# Patient Record
Sex: MALE | Race: WHITE | Employment: OTHER | URBAN - METROPOLITAN AREA
[De-identification: names, ages, dates, MRNs, and addresses within clinical notes are randomized per-mention and may not be internally consistent; named-entity substitution may affect disease eponyms.]

---

## 2017-05-10 ENCOUNTER — OFFICE VISIT (OUTPATIENT)
Dept: FAMILY MEDICINE CLINIC | Age: 77
End: 2017-05-10

## 2017-05-10 VITALS
DIASTOLIC BLOOD PRESSURE: 72 MMHG | HEIGHT: 70 IN | SYSTOLIC BLOOD PRESSURE: 126 MMHG | BODY MASS INDEX: 26.66 KG/M2 | HEART RATE: 65 BPM | WEIGHT: 186.2 LBS | OXYGEN SATURATION: 99 %

## 2017-05-10 DIAGNOSIS — Z13.1 SCREENING FOR DIABETES MELLITUS (DM): ICD-10-CM

## 2017-05-10 DIAGNOSIS — Z00.00 ROUTINE GENERAL MEDICAL EXAMINATION AT A HEALTH CARE FACILITY: Primary | ICD-10-CM

## 2017-05-10 DIAGNOSIS — Z23 NEED FOR SHINGLES VACCINE: ICD-10-CM

## 2017-05-10 DIAGNOSIS — Z12.5 SCREENING FOR PROSTATE CANCER: ICD-10-CM

## 2017-05-10 DIAGNOSIS — Z23 NEED FOR PROPHYLACTIC VACCINATION AGAINST STREPTOCOCCUS PNEUMONIAE (PNEUMOCOCCUS): ICD-10-CM

## 2017-05-10 DIAGNOSIS — Z13.6 SCREENING FOR CARDIOVASCULAR CONDITION: ICD-10-CM

## 2017-05-10 LAB
CHOLESTEROL, TOTAL: 186 MG/DL (ref 0–199)
GLUCOSE BLD-MCNC: 105 MG/DL (ref 70–99)
HDLC SERPL-MCNC: 60 MG/DL (ref 40–60)
LDL CHOLESTEROL CALCULATED: 110 MG/DL
PROSTATE SPECIFIC ANTIGEN: 1.76 NG/ML (ref 0–4)
TRIGL SERPL-MCNC: 78 MG/DL (ref 0–150)
VLDLC SERPL CALC-MCNC: 16 MG/DL

## 2017-05-10 PROCEDURE — G0102 PROSTATE CA SCREENING; DRE: HCPCS | Performed by: FAMILY MEDICINE

## 2017-05-10 PROCEDURE — G0438 PPPS, INITIAL VISIT: HCPCS | Performed by: FAMILY MEDICINE

## 2017-05-10 PROCEDURE — 90670 PCV13 VACCINE IM: CPT | Performed by: FAMILY MEDICINE

## 2017-05-10 PROCEDURE — G0009 ADMIN PNEUMOCOCCAL VACCINE: HCPCS | Performed by: FAMILY MEDICINE

## 2017-05-10 PROCEDURE — 36415 COLL VENOUS BLD VENIPUNCTURE: CPT | Performed by: FAMILY MEDICINE

## 2017-05-10 ASSESSMENT — LIFESTYLE VARIABLES
AUDIT TOTAL SCORE: 2
HOW OFTEN DURING THE LAST YEAR HAVE YOU NEEDED AN ALCOHOLIC DRINK FIRST THING IN THE MORNING TO GET YOURSELF GOING AFTER A NIGHT OF HEAVY DRINKING: 0
HOW OFTEN DO YOU HAVE SIX OR MORE DRINKS ON ONE OCCASION: 0
HOW OFTEN DO YOU HAVE A DRINK CONTAINING ALCOHOL: 2
HAVE YOU OR SOMEONE ELSE BEEN INJURED AS A RESULT OF YOUR DRINKING: 0
HOW OFTEN DURING THE LAST YEAR HAVE YOU HAD A FEELING OF GUILT OR REMORSE AFTER DRINKING: 0
HOW OFTEN DURING THE LAST YEAR HAVE YOU BEEN UNABLE TO REMEMBER WHAT HAPPENED THE NIGHT BEFORE BECAUSE YOU HAD BEEN DRINKING: 0
HOW OFTEN DURING THE LAST YEAR HAVE YOU FOUND THAT YOU WERE NOT ABLE TO STOP DRINKING ONCE YOU HAD STARTED: 0
HOW OFTEN DURING THE LAST YEAR HAVE YOU FAILED TO DO WHAT WAS NORMALLY EXPECTED FROM YOU BECAUSE OF DRINKING: 0
AUDIT-C TOTAL SCORE: 2
HOW MANY STANDARD DRINKS CONTAINING ALCOHOL DO YOU HAVE ON A TYPICAL DAY: 0
HAS A RELATIVE, FRIEND, DOCTOR, OR ANOTHER HEALTH PROFESSIONAL EXPRESSED CONCERN ABOUT YOUR DRINKING OR SUGGESTED YOU CUT DOWN: 0

## 2017-05-10 ASSESSMENT — PATIENT HEALTH QUESTIONNAIRE - PHQ9: SUM OF ALL RESPONSES TO PHQ QUESTIONS 1-9: 0

## 2017-05-10 ASSESSMENT — ANXIETY QUESTIONNAIRES: GAD7 TOTAL SCORE: 0

## 2017-08-31 ENCOUNTER — PROCEDURE VISIT (OUTPATIENT)
Dept: FAMILY MEDICINE CLINIC | Age: 77
End: 2017-08-31

## 2017-08-31 VITALS
WEIGHT: 178 LBS | TEMPERATURE: 97.7 F | DIASTOLIC BLOOD PRESSURE: 64 MMHG | BODY MASS INDEX: 25.91 KG/M2 | OXYGEN SATURATION: 98 % | SYSTOLIC BLOOD PRESSURE: 140 MMHG | HEART RATE: 77 BPM

## 2017-08-31 DIAGNOSIS — R20.9 DISTURBANCE OF SKIN SENSATION: ICD-10-CM

## 2017-08-31 DIAGNOSIS — L91.8 SKIN TAG: Primary | ICD-10-CM

## 2017-08-31 PROCEDURE — 11200 RMVL SKIN TAGS UP TO&INC 15: CPT | Performed by: FAMILY MEDICINE

## 2017-09-19 ENCOUNTER — OFFICE VISIT (OUTPATIENT)
Dept: FAMILY MEDICINE CLINIC | Age: 77
End: 2017-09-19

## 2017-09-19 VITALS — OXYGEN SATURATION: 98 % | SYSTOLIC BLOOD PRESSURE: 128 MMHG | DIASTOLIC BLOOD PRESSURE: 72 MMHG | HEART RATE: 67 BPM

## 2017-09-19 DIAGNOSIS — F41.9 ANXIETY: Primary | ICD-10-CM

## 2017-09-19 PROCEDURE — 4040F PNEUMOC VAC/ADMIN/RCVD: CPT | Performed by: NURSE PRACTITIONER

## 2017-09-19 PROCEDURE — G8427 DOCREV CUR MEDS BY ELIG CLIN: HCPCS | Performed by: NURSE PRACTITIONER

## 2017-09-19 PROCEDURE — G8417 CALC BMI ABV UP PARAM F/U: HCPCS | Performed by: NURSE PRACTITIONER

## 2017-09-19 PROCEDURE — 1036F TOBACCO NON-USER: CPT | Performed by: NURSE PRACTITIONER

## 2017-09-19 PROCEDURE — 99213 OFFICE O/P EST LOW 20 MIN: CPT | Performed by: NURSE PRACTITIONER

## 2017-09-19 PROCEDURE — 1123F ACP DISCUSS/DSCN MKR DOCD: CPT | Performed by: NURSE PRACTITIONER

## 2017-09-19 RX ORDER — ESCITALOPRAM OXALATE 10 MG/1
10 TABLET ORAL DAILY
Qty: 30 TABLET | Refills: 3 | Status: SHIPPED | OUTPATIENT
Start: 2017-09-19 | End: 2018-10-01

## 2017-11-01 ENCOUNTER — TELEPHONE (OUTPATIENT)
Dept: FAMILY MEDICINE CLINIC | Age: 77
End: 2017-11-01

## 2017-11-01 NOTE — TELEPHONE ENCOUNTER
Patient called and wanted to let Shikha Chahal know that the Escitalopram that she prescribed for him is working. He said that he just picked up the 1 refill of the medication.

## 2018-08-22 ENCOUNTER — TELEPHONE (OUTPATIENT)
Dept: FAMILY MEDICINE CLINIC | Age: 78
End: 2018-08-22

## 2018-10-01 ENCOUNTER — OFFICE VISIT (OUTPATIENT)
Dept: FAMILY MEDICINE CLINIC | Age: 78
End: 2018-10-01
Payer: MEDICARE

## 2018-10-01 VITALS
SYSTOLIC BLOOD PRESSURE: 140 MMHG | DIASTOLIC BLOOD PRESSURE: 74 MMHG | BODY MASS INDEX: 25.77 KG/M2 | HEART RATE: 145 BPM | HEIGHT: 70 IN | OXYGEN SATURATION: 98 % | WEIGHT: 180 LBS

## 2018-10-01 DIAGNOSIS — L98.9 SKIN LESION: Primary | ICD-10-CM

## 2018-10-01 PROCEDURE — 99213 OFFICE O/P EST LOW 20 MIN: CPT | Performed by: FAMILY MEDICINE

## 2018-10-01 PROCEDURE — 1101F PT FALLS ASSESS-DOCD LE1/YR: CPT | Performed by: FAMILY MEDICINE

## 2018-10-01 PROCEDURE — G8427 DOCREV CUR MEDS BY ELIG CLIN: HCPCS | Performed by: FAMILY MEDICINE

## 2018-10-01 PROCEDURE — G8484 FLU IMMUNIZE NO ADMIN: HCPCS | Performed by: FAMILY MEDICINE

## 2018-10-01 PROCEDURE — G8419 CALC BMI OUT NRM PARAM NOF/U: HCPCS | Performed by: FAMILY MEDICINE

## 2018-10-01 PROCEDURE — 1036F TOBACCO NON-USER: CPT | Performed by: FAMILY MEDICINE

## 2018-10-01 PROCEDURE — 1123F ACP DISCUSS/DSCN MKR DOCD: CPT | Performed by: FAMILY MEDICINE

## 2018-10-01 PROCEDURE — 4040F PNEUMOC VAC/ADMIN/RCVD: CPT | Performed by: FAMILY MEDICINE

## 2018-10-01 ASSESSMENT — PATIENT HEALTH QUESTIONNAIRE - PHQ9
SUM OF ALL RESPONSES TO PHQ QUESTIONS 1-9: 0
SUM OF ALL RESPONSES TO PHQ QUESTIONS 1-9: 0
2. FEELING DOWN, DEPRESSED OR HOPELESS: 0
1. LITTLE INTEREST OR PLEASURE IN DOING THINGS: 0
SUM OF ALL RESPONSES TO PHQ9 QUESTIONS 1 & 2: 0

## 2018-10-15 ENCOUNTER — OFFICE VISIT (OUTPATIENT)
Dept: FAMILY MEDICINE CLINIC | Age: 78
End: 2018-10-15
Payer: MEDICARE

## 2018-10-15 VITALS
SYSTOLIC BLOOD PRESSURE: 112 MMHG | HEART RATE: 68 BPM | DIASTOLIC BLOOD PRESSURE: 74 MMHG | OXYGEN SATURATION: 98 % | BODY MASS INDEX: 26.49 KG/M2 | WEIGHT: 182 LBS

## 2018-10-15 DIAGNOSIS — C44.519 BASAL CELL CARCINOMA (BCC) OF SKIN OF OTHER PART OF TORSO: ICD-10-CM

## 2018-10-15 DIAGNOSIS — L98.9 SKIN LESION OF CHEST WALL: Primary | ICD-10-CM

## 2018-10-15 PROCEDURE — 11603 EXC TR-EXT MAL+MARG 2.1-3 CM: CPT | Performed by: FAMILY MEDICINE

## 2018-10-24 ENCOUNTER — OFFICE VISIT (OUTPATIENT)
Dept: FAMILY MEDICINE CLINIC | Age: 78
End: 2018-10-24

## 2018-10-24 VITALS
OXYGEN SATURATION: 98 % | DIASTOLIC BLOOD PRESSURE: 80 MMHG | SYSTOLIC BLOOD PRESSURE: 138 MMHG | BODY MASS INDEX: 26.7 KG/M2 | HEART RATE: 78 BPM | WEIGHT: 183.4 LBS

## 2018-10-24 DIAGNOSIS — Z48.02 VISIT FOR SUTURE REMOVAL: Primary | ICD-10-CM

## 2018-10-24 PROCEDURE — 99999 PR OFFICE/OUTPT VISIT,PROCEDURE ONLY: CPT | Performed by: FAMILY MEDICINE

## 2020-02-14 ENCOUNTER — OFFICE VISIT (OUTPATIENT)
Dept: FAMILY MEDICINE CLINIC | Age: 80
End: 2020-02-14
Payer: MEDICARE

## 2020-02-14 VITALS
DIASTOLIC BLOOD PRESSURE: 64 MMHG | WEIGHT: 186 LBS | OXYGEN SATURATION: 95 % | HEART RATE: 61 BPM | SYSTOLIC BLOOD PRESSURE: 132 MMHG | HEIGHT: 70 IN | BODY MASS INDEX: 26.63 KG/M2

## 2020-02-14 PROCEDURE — 11603 EXC TR-EXT MAL+MARG 2.1-3 CM: CPT | Performed by: FAMILY MEDICINE

## 2020-02-14 ASSESSMENT — PATIENT HEALTH QUESTIONNAIRE - PHQ9
2. FEELING DOWN, DEPRESSED OR HOPELESS: 0
SUM OF ALL RESPONSES TO PHQ QUESTIONS 1-9: 0
1. LITTLE INTEREST OR PLEASURE IN DOING THINGS: 0
SUM OF ALL RESPONSES TO PHQ9 QUESTIONS 1 & 2: 0
SUM OF ALL RESPONSES TO PHQ QUESTIONS 1-9: 0

## 2020-02-17 ENCOUNTER — TELEPHONE (OUTPATIENT)
Dept: FAMILY MEDICINE CLINIC | Age: 80
End: 2020-02-17

## 2020-02-24 ENCOUNTER — OFFICE VISIT (OUTPATIENT)
Dept: FAMILY MEDICINE CLINIC | Age: 80
End: 2020-02-24

## 2020-02-24 VITALS
HEART RATE: 62 BPM | OXYGEN SATURATION: 95 % | TEMPERATURE: 97.2 F | SYSTOLIC BLOOD PRESSURE: 134 MMHG | BODY MASS INDEX: 27.07 KG/M2 | WEIGHT: 186 LBS | DIASTOLIC BLOOD PRESSURE: 72 MMHG

## 2020-05-15 ENCOUNTER — TELEPHONE (OUTPATIENT)
Dept: FAMILY MEDICINE CLINIC | Age: 80
End: 2020-05-15

## 2020-05-19 ENCOUNTER — OFFICE VISIT (OUTPATIENT)
Dept: FAMILY MEDICINE CLINIC | Age: 80
End: 2020-05-19
Payer: MEDICARE

## 2020-05-19 VITALS
TEMPERATURE: 98 F | WEIGHT: 182 LBS | OXYGEN SATURATION: 96 % | HEART RATE: 69 BPM | BODY MASS INDEX: 26.49 KG/M2 | DIASTOLIC BLOOD PRESSURE: 76 MMHG | SYSTOLIC BLOOD PRESSURE: 158 MMHG

## 2020-05-19 PROCEDURE — 11302 SHAVE SKIN LESION 1.1-2.0 CM: CPT | Performed by: FAMILY MEDICINE

## 2020-05-23 ENCOUNTER — TELEPHONE (OUTPATIENT)
Dept: FAMILY MEDICINE CLINIC | Age: 80
End: 2020-05-23

## 2020-05-26 ENCOUNTER — TELEPHONE (OUTPATIENT)
Dept: FAMILY MEDICINE CLINIC | Age: 80
End: 2020-05-26

## 2020-05-26 NOTE — TELEPHONE ENCOUNTER
[facility] received a call from: wife    Name of Caller: Eliezer    Relationship to patient:self    Organization name: n/a    Best contact number: [phone]    Reason for call: looking for results done a week ago something taken off chest . Looks good right now.  cb pt

## 2020-10-16 ENCOUNTER — NURSE ONLY (OUTPATIENT)
Dept: FAMILY MEDICINE CLINIC | Age: 80
End: 2020-10-16
Payer: MEDICARE

## 2020-10-16 VITALS — TEMPERATURE: 98.2 F

## 2020-10-16 PROCEDURE — G0008 ADMIN INFLUENZA VIRUS VAC: HCPCS | Performed by: FAMILY MEDICINE

## 2020-10-16 PROCEDURE — 90694 VACC AIIV4 NO PRSRV 0.5ML IM: CPT | Performed by: FAMILY MEDICINE

## 2020-10-16 NOTE — PROGRESS NOTES
Vaccine Information Sheet, \"Influenza - Inactivated\"  given to [facility], or parent/legal guardian of  [facility] and verbalized understanding. Patient responses:    Have you ever had a reaction to a flu vaccine? No  Do you have any current illness? No  Have you ever had Guillian [place] Syndrome? No  Do you have a serious allergy to any of the follow: Neomycin, Polymyxin, Thimerosal, eggs or egg products? No    Flu vaccine given per order. Please see immunization tab. Risks and benefits explained. Current VIS given.

## 2021-01-14 ENCOUNTER — TELEPHONE (OUTPATIENT)
Dept: FAMILY MEDICINE CLINIC | Age: 81
End: 2021-01-14

## 2021-01-21 LAB — SARS-COV-2: ABNORMAL

## 2021-01-30 ENCOUNTER — APPOINTMENT (OUTPATIENT)
Dept: GENERAL RADIOLOGY | Age: 81
DRG: 177 | End: 2021-01-30
Payer: MEDICARE

## 2021-01-30 ENCOUNTER — HOSPITAL ENCOUNTER (INPATIENT)
Age: 81
LOS: 3 days | Discharge: HOME | DRG: 177 | End: 2021-02-03
Attending: EMERGENCY MEDICINE | Admitting: INTERNAL MEDICINE
Payer: MEDICARE

## 2021-01-30 DIAGNOSIS — J96.01 ACUTE HYPOXEMIC RESPIRATORY FAILURE DUE TO COVID-19 (HCC): Primary | ICD-10-CM

## 2021-01-30 DIAGNOSIS — U07.1 ACUTE HYPOXEMIC RESPIRATORY FAILURE DUE TO COVID-19 (HCC): Primary | ICD-10-CM

## 2021-01-30 DIAGNOSIS — R50.9 ACUTE FEBRILE ILLNESS: ICD-10-CM

## 2021-01-30 LAB
A/G RATIO: 1.2 (ref 1.1–2.2)
ALBUMIN SERPL-MCNC: 3.8 G/DL (ref 3.4–5)
ALP BLD-CCNC: 52 U/L (ref 40–129)
ALT SERPL-CCNC: 30 U/L (ref 10–40)
ANION GAP SERPL CALCULATED.3IONS-SCNC: 15 MMOL/L (ref 3–16)
AST SERPL-CCNC: 43 U/L (ref 15–37)
BASOPHILS ABSOLUTE: 0 K/UL (ref 0–0.2)
BASOPHILS RELATIVE PERCENT: 0.2 %
BILIRUB SERPL-MCNC: 0.7 MG/DL (ref 0–1)
BUN BLDV-MCNC: 16 MG/DL (ref 7–20)
CALCIUM SERPL-MCNC: 8.3 MG/DL (ref 8.3–10.6)
CHLORIDE BLD-SCNC: 95 MMOL/L (ref 99–110)
CO2: 21 MMOL/L (ref 21–32)
CREAT SERPL-MCNC: 1.2 MG/DL (ref 0.8–1.3)
EOSINOPHILS ABSOLUTE: 0 K/UL (ref 0–0.6)
EOSINOPHILS RELATIVE PERCENT: 0.1 %
GFR AFRICAN AMERICAN: >60
GFR NON-AFRICAN AMERICAN: 58
GLOBULIN: 3.1 G/DL
GLUCOSE BLD-MCNC: 125 MG/DL (ref 70–99)
HCT VFR BLD CALC: 40.8 % (ref 40.5–52.5)
HEMOGLOBIN: 14 G/DL (ref 13.5–17.5)
LACTIC ACID: 1.3 MMOL/L (ref 0.4–2)
LYMPHOCYTES ABSOLUTE: 0.5 K/UL (ref 1–5.1)
LYMPHOCYTES RELATIVE PERCENT: 10.2 %
MAGNESIUM: 1.9 MG/DL (ref 1.8–2.4)
MCH RBC QN AUTO: 30.1 PG (ref 26–34)
MCHC RBC AUTO-ENTMCNC: 34.3 G/DL (ref 31–36)
MCV RBC AUTO: 87.7 FL (ref 80–100)
MONOCYTES ABSOLUTE: 0.2 K/UL (ref 0–1.3)
MONOCYTES RELATIVE PERCENT: 4.7 %
NEUTROPHILS ABSOLUTE: 4 K/UL (ref 1.7–7.7)
NEUTROPHILS RELATIVE PERCENT: 84.8 %
PDW BLD-RTO: 13.5 % (ref 12.4–15.4)
PLATELET # BLD: 201 K/UL (ref 135–450)
PMV BLD AUTO: 7.6 FL (ref 5–10.5)
POTASSIUM SERPL-SCNC: 3.7 MMOL/L (ref 3.5–5.1)
PRO-BNP: 338 PG/ML (ref 0–449)
PROCALCITONIN: 0.12 NG/ML (ref 0–0.15)
RBC # BLD: 4.65 M/UL (ref 4.2–5.9)
SODIUM BLD-SCNC: 131 MMOL/L (ref 136–145)
SPECIMEN STATUS: NORMAL
TOTAL PROTEIN: 6.9 G/DL (ref 6.4–8.2)
TROPONIN: <0.01 NG/ML
WBC # BLD: 4.7 K/UL (ref 4–11)

## 2021-01-30 PROCEDURE — 6370000000 HC RX 637 (ALT 250 FOR IP): Performed by: EMERGENCY MEDICINE

## 2021-01-30 PROCEDURE — 36415 COLL VENOUS BLD VENIPUNCTURE: CPT

## 2021-01-30 PROCEDURE — 86900 BLOOD TYPING SEROLOGIC ABO: CPT

## 2021-01-30 PROCEDURE — 86140 C-REACTIVE PROTEIN: CPT

## 2021-01-30 PROCEDURE — 85025 COMPLETE CBC W/AUTO DIFF WBC: CPT

## 2021-01-30 PROCEDURE — 96374 THER/PROPH/DIAG INJ IV PUSH: CPT

## 2021-01-30 PROCEDURE — 93005 ELECTROCARDIOGRAM TRACING: CPT | Performed by: EMERGENCY MEDICINE

## 2021-01-30 PROCEDURE — 99284 EMERGENCY DEPT VISIT MOD MDM: CPT

## 2021-01-30 PROCEDURE — 84484 ASSAY OF TROPONIN QUANT: CPT

## 2021-01-30 PROCEDURE — 87040 BLOOD CULTURE FOR BACTERIA: CPT

## 2021-01-30 PROCEDURE — 83735 ASSAY OF MAGNESIUM: CPT

## 2021-01-30 PROCEDURE — 2700000000 HC OXYGEN THERAPY PER DAY

## 2021-01-30 PROCEDURE — 71045 X-RAY EXAM CHEST 1 VIEW: CPT

## 2021-01-30 PROCEDURE — 85379 FIBRIN DEGRADATION QUANT: CPT

## 2021-01-30 PROCEDURE — 83605 ASSAY OF LACTIC ACID: CPT

## 2021-01-30 PROCEDURE — 86901 BLOOD TYPING SEROLOGIC RH(D): CPT

## 2021-01-30 PROCEDURE — 83880 ASSAY OF NATRIURETIC PEPTIDE: CPT

## 2021-01-30 PROCEDURE — 84145 PROCALCITONIN (PCT): CPT

## 2021-01-30 PROCEDURE — 6360000002 HC RX W HCPCS: Performed by: EMERGENCY MEDICINE

## 2021-01-30 PROCEDURE — 85384 FIBRINOGEN ACTIVITY: CPT

## 2021-01-30 PROCEDURE — 82728 ASSAY OF FERRITIN: CPT

## 2021-01-30 PROCEDURE — 86850 RBC ANTIBODY SCREEN: CPT

## 2021-01-30 PROCEDURE — 80053 COMPREHEN METABOLIC PANEL: CPT

## 2021-01-30 PROCEDURE — 83615 LACTATE (LD) (LDH) ENZYME: CPT

## 2021-01-30 RX ORDER — DEXAMETHASONE SODIUM PHOSPHATE 10 MG/ML
6 INJECTION INTRAMUSCULAR; INTRAVENOUS ONCE
Status: COMPLETED | OUTPATIENT
Start: 2021-01-30 | End: 2021-01-30

## 2021-01-30 RX ORDER — ACETAMINOPHEN 500 MG
1000 TABLET ORAL ONCE
Status: COMPLETED | OUTPATIENT
Start: 2021-01-30 | End: 2021-01-30

## 2021-01-30 RX ADMIN — DEXAMETHASONE SODIUM PHOSPHATE 6 MG: 10 INJECTION INTRAMUSCULAR; INTRAVENOUS at 23:11

## 2021-01-30 RX ADMIN — ACETAMINOPHEN 1000 MG: 500 TABLET ORAL at 23:10

## 2021-01-30 ASSESSMENT — ENCOUNTER SYMPTOMS: TACHYPNEA: 1

## 2021-01-31 PROBLEM — J06.9 ACUTE RESPIRATORY DISEASE DUE TO COVID-19 VIRUS: Status: ACTIVE | Noted: 2021-01-31

## 2021-01-31 PROBLEM — U07.1 ACUTE RESPIRATORY DISEASE DUE TO COVID-19 VIRUS: Status: ACTIVE | Noted: 2021-01-31

## 2021-01-31 LAB
ABO/RH: NORMAL
ANTIBODY SCREEN: NORMAL
BLOOD BANK DISPENSE STATUS: NORMAL
BLOOD BANK PRODUCT CODE: NORMAL
BPU ID: NORMAL
C-REACTIVE PROTEIN: 80.9 MG/L (ref 0–5.1)
D DIMER: 524 NG/ML DDU (ref 0–229)
D DIMER: 558 NG/ML DDU (ref 0–229)
DESCRIPTION BLOOD BANK: NORMAL
EKG ATRIAL RATE: 96 BPM
EKG DIAGNOSIS: NORMAL
EKG Q-T INTERVAL: 326 MS
EKG QRS DURATION: 102 MS
EKG QTC CALCULATION (BAZETT): 416 MS
EKG R AXIS: -59 DEGREES
EKG T AXIS: 6 DEGREES
EKG VENTRICULAR RATE: 98 BPM
FERRITIN: 892.4 NG/ML (ref 30–400)
FIBRINOGEN: 858 MG/DL (ref 200–397)
LACTATE DEHYDROGENASE: 318 U/L (ref 100–190)
LACTIC ACID: 1.1 MMOL/L (ref 0.4–2)
REASON FOR REJECTION: NORMAL
REJECTED TEST: NORMAL

## 2021-01-31 PROCEDURE — XW033E5 INTRODUCTION OF REMDESIVIR ANTI-INFECTIVE INTO PERIPHERAL VEIN, PERCUTANEOUS APPROACH, NEW TECHNOLOGY GROUP 5: ICD-10-PCS | Performed by: INTERNAL MEDICINE

## 2021-01-31 PROCEDURE — 36415 COLL VENOUS BLD VENIPUNCTURE: CPT

## 2021-01-31 PROCEDURE — 93010 ELECTROCARDIOGRAM REPORT: CPT | Performed by: INTERNAL MEDICINE

## 2021-01-31 PROCEDURE — 83605 ASSAY OF LACTIC ACID: CPT

## 2021-01-31 PROCEDURE — 85379 FIBRIN DEGRADATION QUANT: CPT

## 2021-01-31 PROCEDURE — 1200000000 HC SEMI PRIVATE

## 2021-01-31 PROCEDURE — 6360000002 HC RX W HCPCS: Performed by: INTERNAL MEDICINE

## 2021-01-31 PROCEDURE — XW13325 TRANSFUSION OF CONVALESCENT PLASMA (NONAUTOLOGOUS) INTO PERIPHERAL VEIN, PERCUTANEOUS APPROACH, NEW TECHNOLOGY GROUP 5: ICD-10-PCS | Performed by: INTERNAL MEDICINE

## 2021-01-31 PROCEDURE — 2700000000 HC OXYGEN THERAPY PER DAY

## 2021-01-31 PROCEDURE — 87449 NOS EACH ORGANISM AG IA: CPT

## 2021-01-31 PROCEDURE — 2580000003 HC RX 258: Performed by: INTERNAL MEDICINE

## 2021-01-31 PROCEDURE — 94761 N-INVAS EAR/PLS OXIMETRY MLT: CPT

## 2021-01-31 PROCEDURE — 87205 SMEAR GRAM STAIN: CPT

## 2021-01-31 PROCEDURE — 2500000003 HC RX 250 WO HCPCS: Performed by: INTERNAL MEDICINE

## 2021-01-31 RX ORDER — ACETAMINOPHEN 325 MG/1
650 TABLET ORAL EVERY 6 HOURS PRN
Status: DISCONTINUED | OUTPATIENT
Start: 2021-01-31 | End: 2021-01-31 | Stop reason: SDUPTHER

## 2021-01-31 RX ORDER — 0.9 % SODIUM CHLORIDE 0.9 %
30 INTRAVENOUS SOLUTION INTRAVENOUS PRN
Status: DISCONTINUED | OUTPATIENT
Start: 2021-01-31 | End: 2021-02-03 | Stop reason: HOSPADM

## 2021-01-31 RX ORDER — ONDANSETRON 2 MG/ML
4 INJECTION INTRAMUSCULAR; INTRAVENOUS EVERY 6 HOURS PRN
Status: DISCONTINUED | OUTPATIENT
Start: 2021-01-31 | End: 2021-02-03 | Stop reason: HOSPADM

## 2021-01-31 RX ORDER — METHYLPREDNISOLONE SODIUM SUCCINATE 40 MG/ML
40 INJECTION, POWDER, LYOPHILIZED, FOR SOLUTION INTRAMUSCULAR; INTRAVENOUS EVERY 12 HOURS
Status: DISCONTINUED | OUTPATIENT
Start: 2021-01-31 | End: 2021-02-03 | Stop reason: HOSPADM

## 2021-01-31 RX ORDER — SODIUM CHLORIDE 9 MG/ML
INJECTION, SOLUTION INTRAVENOUS PRN
Status: DISCONTINUED | OUTPATIENT
Start: 2021-01-31 | End: 2021-02-03 | Stop reason: HOSPADM

## 2021-01-31 RX ORDER — ACETAMINOPHEN 650 MG/1
650 SUPPOSITORY RECTAL EVERY 6 HOURS PRN
Status: DISCONTINUED | OUTPATIENT
Start: 2021-01-31 | End: 2021-02-03 | Stop reason: HOSPADM

## 2021-01-31 RX ORDER — POLYETHYLENE GLYCOL 3350 17 G/17G
17 POWDER, FOR SOLUTION ORAL DAILY PRN
Status: DISCONTINUED | OUTPATIENT
Start: 2021-01-31 | End: 2021-02-03 | Stop reason: HOSPADM

## 2021-01-31 RX ORDER — ACETAMINOPHEN 650 MG/1
650 SUPPOSITORY RECTAL EVERY 6 HOURS PRN
Status: DISCONTINUED | OUTPATIENT
Start: 2021-01-31 | End: 2021-01-31 | Stop reason: SDUPTHER

## 2021-01-31 RX ORDER — GUAIFENESIN/DEXTROMETHORPHAN 100-10MG/5
5 SYRUP ORAL EVERY 4 HOURS PRN
Status: DISCONTINUED | OUTPATIENT
Start: 2021-01-31 | End: 2021-02-03 | Stop reason: HOSPADM

## 2021-01-31 RX ORDER — SODIUM CHLORIDE 0.9 % (FLUSH) 0.9 %
10 SYRINGE (ML) INJECTION EVERY 12 HOURS SCHEDULED
Status: DISCONTINUED | OUTPATIENT
Start: 2021-01-31 | End: 2021-02-03 | Stop reason: HOSPADM

## 2021-01-31 RX ORDER — SODIUM CHLORIDE 0.9 % (FLUSH) 0.9 %
10 SYRINGE (ML) INJECTION PRN
Status: DISCONTINUED | OUTPATIENT
Start: 2021-01-31 | End: 2021-02-03 | Stop reason: HOSPADM

## 2021-01-31 RX ORDER — PROMETHAZINE HYDROCHLORIDE 25 MG/1
12.5 TABLET ORAL EVERY 6 HOURS PRN
Status: DISCONTINUED | OUTPATIENT
Start: 2021-01-31 | End: 2021-02-03 | Stop reason: HOSPADM

## 2021-01-31 RX ORDER — ACETAMINOPHEN 325 MG/1
650 TABLET ORAL EVERY 6 HOURS PRN
Status: DISCONTINUED | OUTPATIENT
Start: 2021-01-31 | End: 2021-02-03 | Stop reason: HOSPADM

## 2021-01-31 RX ADMIN — SODIUM CHLORIDE, PRESERVATIVE FREE 10 ML: 5 INJECTION INTRAVENOUS at 22:04

## 2021-01-31 RX ADMIN — METHYLPREDNISOLONE SODIUM SUCCINATE 40 MG: 40 INJECTION, POWDER, LYOPHILIZED, FOR SOLUTION INTRAMUSCULAR; INTRAVENOUS at 19:00

## 2021-01-31 RX ADMIN — SODIUM CHLORIDE, PRESERVATIVE FREE 10 ML: 5 INJECTION INTRAVENOUS at 09:43

## 2021-01-31 RX ADMIN — ENOXAPARIN SODIUM 30 MG: 30 INJECTION SUBCUTANEOUS at 02:02

## 2021-01-31 RX ADMIN — ENOXAPARIN SODIUM 30 MG: 30 INJECTION SUBCUTANEOUS at 09:40

## 2021-01-31 RX ADMIN — METHYLPREDNISOLONE SODIUM SUCCINATE 40 MG: 40 INJECTION, POWDER, LYOPHILIZED, FOR SOLUTION INTRAMUSCULAR; INTRAVENOUS at 05:35

## 2021-01-31 RX ADMIN — REMDESIVIR 200 MG: 100 INJECTION, POWDER, LYOPHILIZED, FOR SOLUTION INTRAVENOUS at 05:35

## 2021-01-31 RX ADMIN — ENOXAPARIN SODIUM 30 MG: 30 INJECTION SUBCUTANEOUS at 20:57

## 2021-01-31 ASSESSMENT — PAIN SCALES - GENERAL
PAINLEVEL_OUTOF10: 0
PAINLEVEL_OUTOF10: 0

## 2021-01-31 NOTE — ED PROVIDER NOTES
ABDOMEN: No tenderness. Soft. Non-distended. No masses. No organomegaly. No guarding or rebound. Normal bowel sounds throughout. MUSCULOSKELETAL: No extremity edema. Compartments soft. No deformity. No tenderness in the extremities. All extremities neurovascularly intact. SKIN: Warm and a little clammy. No acute rashes. NEUROLOGICAL: Alert and oriented. CN's 2-12 intact. No gross facial drooping. Strength 5/5, sensation intact. 2 plus DTR's in knees bilaterally. Gait normal.  PSYCHIATRIC: Normal mood and affect. LABS  I have reviewed all labs for this visit.    Results for orders placed or performed during the hospital encounter of 01/30/21   CBC Auto Differential   Result Value Ref Range    WBC 4.7 4.0 - 11.0 K/uL    RBC 4.65 4.20 - 5.90 M/uL    Hemoglobin 14.0 13.5 - 17.5 g/dL    Hematocrit 40.8 40.5 - 52.5 %    MCV 87.7 80.0 - 100.0 fL    MCH 30.1 26.0 - 34.0 pg    MCHC 34.3 31.0 - 36.0 g/dL    RDW 13.5 12.4 - 15.4 %    Platelets 382 407 - 168 K/uL    MPV 7.6 5.0 - 10.5 fL    Neutrophils % 84.8 %    Lymphocytes % 10.2 %    Monocytes % 4.7 %    Eosinophils % 0.1 %    Basophils % 0.2 %    Neutrophils Absolute 4.0 1.7 - 7.7 K/uL    Lymphocytes Absolute 0.5 (L) 1.0 - 5.1 K/uL    Monocytes Absolute 0.2 0.0 - 1.3 K/uL    Eosinophils Absolute 0.0 0.0 - 0.6 K/uL    Basophils Absolute 0.0 0.0 - 0.2 K/uL   Comprehensive Metabolic Panel   Result Value Ref Range    Sodium 131 (L) 136 - 145 mmol/L    Potassium 3.7 3.5 - 5.1 mmol/L    Chloride 95 (L) 99 - 110 mmol/L    CO2 21 21 - 32 mmol/L    Anion Gap 15 3 - 16    Glucose 125 (H) 70 - 99 mg/dL    BUN 16 7 - 20 mg/dL    CREATININE 1.2 0.8 - 1.3 mg/dL    GFR Non-African American 58 (A) >60    GFR African American >60 >60    Calcium 8.3 8.3 - 10.6 mg/dL    Total Protein 6.9 6.4 - 8.2 g/dL    Albumin 3.8 3.4 - 5.0 g/dL    Albumin/Globulin Ratio 1.2 1.1 - 2.2    Total Bilirubin 0.7 0.0 - 1.0 mg/dL    Alkaline Phosphatase 52 40 - 129 U/L    ALT 30 10 - 40 U/L CLINICAL IMPRESSION  1. Acute hypoxemic respiratory failure due to COVID-19 ([facility] [address]    2. Acute febrile illness        Blood pressure 137/67, pulse 97, temperature 103.1 °F (39.5 °C), temperature source Oral, resp. rate 29, height 5' 10\" (1.778 m), weight 177 lb (80.3 kg), SpO2 93 %. Obdulia Simmons was admitted in fair condition. The plan is to admit to the hospital at this time under the hospitalist service. Dr. Billie Barthel accepted the patient and will take over the patient's care. The total critical care time spent while evaluating and treating this patient was 40 minutes. This excludes time spent doing separately billable procedures. This includes time at the bedside, data interpretation, medication management, obtaining critical history from collateral sources if the patient is unable to provide it directly, and physician consultation. Specifics of interventions taken and potentially life-threatening diagnostic considerations are listed above in the medical decision making. DISCLAIMER: This chart was created using Dragon dictation software. Efforts were made by me to ensure accuracy, however some errors may be present due to limitations of this technology and occasionally words are not transcribed correctly.         Valerie Hollins MD  01/31/21 5456

## 2021-01-31 NOTE — PROGRESS NOTES
4 Eyes Skin Assessment     The patient is being assess for  Admission    I agree that 2 RN's have performed a thorough Head to Toe Skin Assessment on the patient. ALL assessment sites listed below have been assessed. Areas assessed by both nurses: Becca NARVAEZ/Duane  [x]   Head, Face, and Ears   [x]   Shoulders, Back, and Chest  [x]   Arms, Elbows, and Hands   [x]   Coccyx, Sacrum, and IschIum  [x]   Legs, Feet, and Heels        Does the Patient have Skin Breakdown?   No         Ethan Prevention initiated:  No   Wound Care Orders initiated:  No      [facility] nurse consulted for Pressure Injury (Stage 3,4, Unstageable, DTI, NWPT, and Complex wounds), New and Established Ostomies:  No      Nurse 1 eSignature: Electronically signed by Ila Zuniga RN on 1/31/21 at 6:12 AM EST    **SHARE this note so that the co-signing nurse is able to place an eSignature**    Nurse 2 eSignature: Electronically signed by Terri Kimbrough RN on 1/31/21 at 6:13 AM EST

## 2021-01-31 NOTE — PROGRESS NOTES
Hospitalist Progress Note      PCP: Seth Sterling MD    Date of Admission: 1/30/2021    Chief Complaint: short of breath      Subjective:  He feels about the same. Had a 103.1 fever last night. Breathing easily today on 2LNC. Coughing some. No chest pain. Medications:  Reviewed    Infusion Medications    sodium chloride       Scheduled Medications    sodium chloride flush  10 mL Intravenous 2 times per day    enoxaparin  30 mg Subcutaneous BID    methylPREDNISolone  40 mg Intravenous Q12H    [START ON 2/1/2021] remdesivir IVPB  100 mg Intravenous Q24H     PRN Meds: sodium chloride flush, promethazine **OR** ondansetron, polyethylene glycol, acetaminophen **OR** acetaminophen, guaiFENesin-dextromethorphan, sodium chloride, sodium chloride      Intake/Output Summary (Last 24 hours) at 1/31/2021 1249  Last data filed at 1/31/2021 1151  Gross per 24 hour   Intake 469.65 ml   Output 340 ml   Net 129.65 ml       Physical Exam Performed:    BP (!) 151/79   Pulse 89   Temp 98.2 °F (36.8 °C) (Oral)   Resp 18   Ht 5' 10\" (1.778 m)   Wt 173 lb 8 oz (78.7 kg)   SpO2 90%   BMI 24.89 kg/m²     General appearance: No apparent distress, appears stated age and cooperative. HEENT: Pupils equal, round, and reactive to light. Conjunctivae/corneas clear. Neck: Supple, with full range of motion. No jugular venous distention. Trachea midline. Respiratory:  Normal respiratory effort. Bilaterally without Rales/Wheezes. Rhonchi bilaterally. Cardiovascular: Regular rate and rhythm with normal S1/S2 without murmurs, rubs or gallops. Abdomen: Soft, non-tender, non-distended with normal bowel sounds. Musculoskeletal: No clubbing, cyanosis or edema bilaterally. Full range of motion without deformity. Skin: Skin color, texture, turgor normal.  No rashes or lesions. Neurologic:  Neurovascularly intact without any focal sensory/motor deficits.  Cranial nerves: II-XII intact, grossly non-focal. Psychiatric: Alert and oriented, thought content appropriate, normal insight. Cognitive slowing. Capillary Refill: Brisk,< 3 seconds   Peripheral Pulses: +2 palpable, equal bilaterally       Labs:   Recent Labs     01/30/21 2249   WBC 4.7   HGB 14.0   HCT 40.8        Recent Labs     01/30/21 2249   *   K 3.7   CL 95*   CO2 21   BUN 16   CREATININE 1.2   CALCIUM 8.3     Recent Labs     01/30/21 2249   AST 43*   ALT 30   BILITOT 0.7   ALKPHOS 52     No results for input(s): INR in the last 72 hours. Recent Labs     01/30/21 2249   TROPONINI <0.01       Urinalysis:      Lab Results   Component Value Date    NITRU NEGATIVE 02/03/2011    BLOODU NEGATIVE 02/03/2011    SPECGRAV >=1.030 02/03/2011    GLUCOSEU NEGATIVE 02/03/2011       Radiology:  XR CHEST PORTABLE   Preliminary Result   Left greater than right bibasilar parenchymal opacities are noted which could   be related to atelectasis/infiltrates. Assessment/Plan:    Active Hospital Problems    Diagnosis    Acute respiratory disease due to COVID-19 virus [U07.1, J06.9]       \"Patient presented to the ED with complaints of shortness of breath. Patient reports he tested positive for Covid about 10 days back on 1/20 due to positive exposure to family members who had Covid. Reports he was at a family members residence was in home hospice and end-of-life, and reports he had 9 other family members that visiting who later tested positive for COVID-19. since then he has developed symptoms of shortness of breath, nonproductive cough, chills, fatigue and malaise. Symptoms have been gradually getting worse. \"      RESRL-11  - diagnosed 1/20, symptomatic 1/26  - steroids, remdesivir started 1/31. Given convalescent plasma 1/31.  - wean to RA as tolerated. The patient has no supplemental O2 requirement at baseline.    - defer ID consultation at this point.       DVT Prophylaxis: enoxaparin at increased dose  Diet: DIET GENERAL; Code Status: Full Code    PT/OT Eval Status: not indicated    Dispo - when respiratory decompensation seems less possible. Perhaps 2/2. He may have a new oxygen requirement at the time of discharge. He lives at home.       Hank Santamaria MD

## 2021-01-31 NOTE — PROGRESS NOTES
Unit 1 plasma complete. Pt tolerated well. Without s/sx of adverse reaction. VSS. RN remained at bedside.

## 2021-01-31 NOTE — PROGRESS NOTES
Unit 1 convalescent plasma being administered. RN at bedside. Pt tolerating well without s/sx of reaction. VSS.

## 2021-01-31 NOTE — PROGRESS NOTES
Pt admitted to C3, room 342. A/O x 4. VSS. 2L O2 with SpO2 >90%. Lungs diminished. 4 eye skin assessment with Ron Castillo RN. Oriented to room. Belongings in closet. Nonskid footwear on. Bed in low position, wheels locked, SR x 2, call light and bedside table within reach.

## 2021-01-31 NOTE — PROGRESS NOTES
Pt refused convalescent plasma. FYI secure message sent to Dr. Kendall Sandoval.   Lab notified of refusal.  Electronically signed by Marcelina Red RN on 1/31/2021 at 4:22 AM

## 2021-01-31 NOTE — H&P
Hospital Medicine History & Physical      PCP: Leeann Vieyra MD    Date of Admission: 1/30/2021    Date of Service: Pt seen/examined on 1/31/2021 and Admitted to Inpatient with expected LOS greater than two midnights due to medical therapy    Chief Complaint: Shortness of breath      History Of Present Illness:   78 y.o. male who presented to [facility] with shortness of breath  Patient presented to the ED with complaints of shortness of breath. Patient reports he tested positive for Covid about 10 days back on 1/20 due to positive exposure to family members who had Covid. Reports he was at a family members residence was in home hospice and end-of-life, and reports he had 9 other family members that visiting who later tested positive for COVID-19. since then he has developed symptoms of shortness of breath, nonproductive cough, chills, fatigue and malaise. Symptoms have been gradually getting worse. Does have some nausea but no vomiting, no abdominal pain or diarrhea. Patient reports shortness of breath is worse on exertion. Denies any chest pain. Past Medical History:          Diagnosis Date    Actinic keratoses     Basal cell carcinoma nos     Renal calculi     Skull fracture (HCC)     as a child       Past Surgical History:          Procedure Laterality Date    HERNIA REPAIR      right and left    MALIGNANT SKIN LESION EXCISION      UMBILICAL HERNIA REPAIR         Medications Prior to Admission:      Prior to Admission medications    Medication Sig Start Date End Date Taking? Authorizing Provider   Multiple Vitamins-Minerals (MENS MULTIVITAMIN PLUS PO) Take by mouth    Historical Provider, MD       Allergies:  Patient has no known allergies. Social History:      The patient currently lives at home    TOBACCO:   reports that he quit smoking about 62 years ago. His smoking use included cigarettes. He has a 0.50 pack-year smoking history.  He has quit using smokeless tobacco. ETOH:   reports current alcohol use. E-Cigarettes/Vaping Use     Questions Responses    E-Cigarette/Vaping Use Never User    Start Date     Passive Exposure     Quit Date     Counseling Given     Comments             Family History:   Positive as follows:        Problem Relation Age of Onset    Heart Disease Mother 80        -    Heart Disease Father 80        MI-   Sydnie Yip Tuberculosis Father        REVIEW OF SYSTEMS:   Pertinent positives as noted in the HPI. All other systems reviewed and negative. PHYSICAL EXAM PERFORMED:    /64   Pulse 70   Temp 97.4 °F (36.3 °C) (Oral)   Resp 24   Ht 5' 10\" (1.778 m)   Wt 173 lb 8 oz (78.7 kg)   SpO2 96%   BMI 24.89 kg/m²     General appearance:  No apparent distress, appears stated age and cooperative. HEENT:  Normal cephalic, atraumatic without obvious deformity. Pupils equal, round, and reactive to light. Extra ocular muscles intact. Conjunctivae/corneas clear. Neck: Supple, with full range of motion. No jugular venous distention. Trachea midline. Respiratory:  Normal respiratory effort. Clear to auscultation, bilaterally without Rales/Wheezes/Rhonchi. Cardiovascular:  Regular rate and rhythm with normal S1/S2 without murmurs, rubs or gallops. Abdomen: Soft, non-tender, non-distended with normal bowel sounds. Musculoskeletal:  No clubbing, cyanosis or edema bilaterally. Full range of motion without deformity. Skin: Skin color, texture, turgor normal.  No rashes or lesions. Neurologic:  Neurovascularly intact without any focal sensory/motor deficits.  Cranial nerves: II-XII intact, grossly non-focal.  Psychiatric:  Alert and oriented, thought content appropriate, normal insight  Capillary Refill: Brisk,< 3 seconds   Peripheral Pulses: +2 palpable, equal bilaterally       Labs:     Recent Labs     21  2249   WBC 4.7   HGB 14.0   HCT 40.8        Recent Labs     21  2249   *   K 3.7   CL 95*   CO2 21   BUN 16 Code Status: Full Code    PT/OT Eval Status: ambulatory    Dispo - IP stay       Vanetta Lesch, MD    Thank you Linda Martinez MD for the opportunity to be involved in this patient's care. If you have any questions or concerns please feel free to contact me at [phone].

## 2021-01-31 NOTE — ED NOTES
Patient identified as a positive fall risk on the ED triage fall screening. Patient placed in fall precautions which includes:  yellow fall risk bracelet on wrist, yellow socks on feet, \"Be Safe\" sign placed on patient's door, and bed alarm placed under patient/alarm turned on. Patient instructed on importance of not getting out of bed or ambulating without assistance for safety.           Ana Luisa Payne RN  01/30/21 8444

## 2021-02-01 LAB
A/G RATIO: 1.1 (ref 1.1–2.2)
ALBUMIN SERPL-MCNC: 3.4 G/DL (ref 3.4–5)
ALP BLD-CCNC: 48 U/L (ref 40–129)
ALT SERPL-CCNC: 36 U/L (ref 10–40)
ANION GAP SERPL CALCULATED.3IONS-SCNC: 10 MMOL/L (ref 3–16)
AST SERPL-CCNC: 34 U/L (ref 15–37)
BILIRUB SERPL-MCNC: 0.6 MG/DL (ref 0–1)
BUN BLDV-MCNC: 22 MG/DL (ref 7–20)
CALCIUM SERPL-MCNC: 8.6 MG/DL (ref 8.3–10.6)
CHLORIDE BLD-SCNC: 102 MMOL/L (ref 99–110)
CO2: 23 MMOL/L (ref 21–32)
CREAT SERPL-MCNC: 1 MG/DL (ref 0.8–1.3)
D DIMER: 400 NG/ML DDU (ref 0–229)
FIBRINOGEN: 655 MG/DL (ref 200–397)
GFR AFRICAN AMERICAN: >60
GFR NON-AFRICAN AMERICAN: >60
GLOBULIN: 3 G/DL
GLUCOSE BLD-MCNC: 170 MG/DL (ref 70–99)
HCT VFR BLD CALC: 41.6 % (ref 40.5–52.5)
HEMOGLOBIN: 14.3 G/DL (ref 13.5–17.5)
L. PNEUMOPHILA SEROGP 1 UR AG: NORMAL
LACTATE DEHYDROGENASE: 271 U/L (ref 100–190)
MCH RBC QN AUTO: 30 PG (ref 26–34)
MCHC RBC AUTO-ENTMCNC: 34.4 G/DL (ref 31–36)
MCV RBC AUTO: 87.3 FL (ref 80–100)
PDW BLD-RTO: 12.9 % (ref 12.4–15.4)
PLATELET # BLD: 240 K/UL (ref 135–450)
PMV BLD AUTO: 7.4 FL (ref 5–10.5)
POTASSIUM REFLEX MAGNESIUM: 3.6 MMOL/L (ref 3.5–5.1)
RBC # BLD: 4.76 M/UL (ref 4.2–5.9)
SODIUM BLD-SCNC: 135 MMOL/L (ref 136–145)
STREP PNEUMONIAE ANTIGEN, URINE: NORMAL
TOTAL PROTEIN: 6.4 G/DL (ref 6.4–8.2)
WBC # BLD: 5.6 K/UL (ref 4–11)

## 2021-02-01 PROCEDURE — 6360000002 HC RX W HCPCS: Performed by: INTERNAL MEDICINE

## 2021-02-01 PROCEDURE — 94761 N-INVAS EAR/PLS OXIMETRY MLT: CPT

## 2021-02-01 PROCEDURE — 2500000003 HC RX 250 WO HCPCS: Performed by: INTERNAL MEDICINE

## 2021-02-01 PROCEDURE — 85379 FIBRIN DEGRADATION QUANT: CPT

## 2021-02-01 PROCEDURE — 85384 FIBRINOGEN ACTIVITY: CPT

## 2021-02-01 PROCEDURE — 2700000000 HC OXYGEN THERAPY PER DAY

## 2021-02-01 PROCEDURE — 6370000000 HC RX 637 (ALT 250 FOR IP): Performed by: INTERNAL MEDICINE

## 2021-02-01 PROCEDURE — 36415 COLL VENOUS BLD VENIPUNCTURE: CPT

## 2021-02-01 PROCEDURE — 83615 LACTATE (LD) (LDH) ENZYME: CPT

## 2021-02-01 PROCEDURE — 1200000000 HC SEMI PRIVATE

## 2021-02-01 PROCEDURE — 85027 COMPLETE CBC AUTOMATED: CPT

## 2021-02-01 PROCEDURE — 80053 COMPREHEN METABOLIC PANEL: CPT

## 2021-02-01 PROCEDURE — 2580000003 HC RX 258: Performed by: INTERNAL MEDICINE

## 2021-02-01 RX ADMIN — SODIUM CHLORIDE, PRESERVATIVE FREE 10 ML: 5 INJECTION INTRAVENOUS at 20:52

## 2021-02-01 RX ADMIN — GUAIFENESIN AND DEXTROMETHORPHAN 5 ML: 100; 10 SYRUP ORAL at 03:45

## 2021-02-01 RX ADMIN — REMDESIVIR 100 MG: 100 INJECTION, POWDER, LYOPHILIZED, FOR SOLUTION INTRAVENOUS at 05:19

## 2021-02-01 RX ADMIN — METHYLPREDNISOLONE SODIUM SUCCINATE 40 MG: 40 INJECTION, POWDER, LYOPHILIZED, FOR SOLUTION INTRAMUSCULAR; INTRAVENOUS at 05:19

## 2021-02-01 RX ADMIN — SODIUM CHLORIDE, PRESERVATIVE FREE 10 ML: 5 INJECTION INTRAVENOUS at 08:28

## 2021-02-01 RX ADMIN — GUAIFENESIN AND DEXTROMETHORPHAN 5 ML: 100; 10 SYRUP ORAL at 12:00

## 2021-02-01 RX ADMIN — ENOXAPARIN SODIUM 30 MG: 30 INJECTION SUBCUTANEOUS at 08:28

## 2021-02-01 RX ADMIN — METHYLPREDNISOLONE SODIUM SUCCINATE 40 MG: 40 INJECTION, POWDER, LYOPHILIZED, FOR SOLUTION INTRAMUSCULAR; INTRAVENOUS at 17:23

## 2021-02-01 RX ADMIN — ACETAMINOPHEN 650 MG: 325 TABLET ORAL at 03:45

## 2021-02-01 RX ADMIN — ENOXAPARIN SODIUM 30 MG: 30 INJECTION SUBCUTANEOUS at 20:52

## 2021-02-01 ASSESSMENT — PAIN SCALES - GENERAL: PAINLEVEL_OUTOF10: 5

## 2021-02-01 NOTE — PROGRESS NOTES
Comprehensive Nutrition Assessment    Type and Reason for Visit:  Initial, Positive Nutrition Screen    Nutrition Recommendations/Plan:   1. Continue general diet  2. Add Ensure ONS - monitor acceptance and need to adjust  3. Encourage po intakes  4. Monitor po intakes, nutrition adequacy, weights, pertinent labs, BMs    Nutrition Assessment:  Positive nutrition screen for weight loss and decreased appetite. Pt admitted with c/o SOB. Covid-19+, in droplet plus isolation. Unable to reach pt via phone after multiple attempts today. Currently on a general diet with po intakes % per EMR. No significant weight loss noted in EMR. Will add ONS to help pt meet nutrition needs and continue to monitor. Malnutrition Assessment:  Malnutrition Status: At risk for malnutrition (Comment)      Estimated Daily Nutrient Needs:  Energy (kcal):  9243-3753; Weight Used for Energy Requirements:  Current     Protein (g):  79-95; Weight Used for Protein Requirements:  Current        Fluid (ml/day):  7269-3134; Method Used for Fluid Requirements:  1 ml/kcal      Nutrition Related Findings:  BM x2 on 1/31      Wounds:  None       Current Nutrition Therapies:    DIET GENERAL;     Anthropometric Measures:  · Height: 5' 10\" (177.8 cm)  · Current Body Weight: 173 lb 11.2 oz (78.8 kg)   · Ideal Body Weight: 166 lbs   · BMI: 24.9  · BMI Categories: Normal Weight (BMI 22.0 to 24.9) age over 72       Nutrition Diagnosis:   · Inadequate oral intake related to inadequate protein-energy intake as evidenced by poor intake prior to admission      Nutrition Interventions:   Food and/or Nutrient Delivery:  Continue Current Diet  Nutrition Education/Counseling:  No recommendation at this time   Coordination of Nutrition Care:  Continue to monitor while inpatient    Goals:  Pt will have po intakes 50% or greater this admission       Nutrition Monitoring and Evaluation:   Behavioral-Environmental Outcomes:  None Identified Food/Nutrient Intake Outcomes:  Food and Nutrient Intake, Supplement Intake  Physical Signs/Symptoms Outcomes:  Weight     Discharge Planning:    Continue current diet     Electronically signed by Asiya Mccollum RD, LD on 2/1/21 at 3:44 PM EST    Contact: [phone]

## 2021-02-01 NOTE — CARE COORDINATION
CASE MANAGEMENT INITIAL ASSESSMENT      Reviewed chart and completed assessment via telephone with:terrance Bolivar  Explained Case Management role/services. Primary contact information:Randi [address] :     randi      Can this person be reached and be able to respond quickly, such as within a few minutes or hours? Yes  Who would be your back-up decision maker? Name Boris Montero  Phone [phone]    Admit date/status:1/31/21  Diagnosis:acute respiratory disease   Is this a Readmission?:  No      Insurance:medicare   Precert required for SNF: No       3 night stay required: No    Living arrangements, Adls, care needs, prior to admission:lives in 2 story home with wife    Theodora Mireles at home:  Walker__Cane__RTS__ BSC__Shower Chair__  02__ HHN__ CPAP__  [facility] Bed__ W/C___ Other__________    Services in the home and/or outpatient, prior to [address] (if applicable)   · Name:  · Address:  · Dialysis Schedule:  · Phone:  · Fax:    PT/OT recs:none    Hospital Exemption Notification (HEN):needed for SNF    Barriers to discharge:none    Plan/comments:spokewith daughter David Bolivar. Stated patient lives in home with wife. IPTA, uses no DME and drives. Denied any DCP needs. Currently on 2LNC O2. On remdesivir. CM will follow for possible DCP needs.  Carmen Jones RN       ECOC on chart for MD signature

## 2021-02-01 NOTE — PROGRESS NOTES
Assessment completed and documented. VSS, 3L o2 sating at 93%. Dyspnea on exertion. Crackles in bases of lungs, encouraged I.S. x1 assist to bathroom. A/ox4. Denies pain. Bed locked and in lowest position. Bedside table and call light within reach. Denies further needs at this time.

## 2021-02-01 NOTE — PROGRESS NOTES
Updated patients daughter POC and patient status. Daughter stated that 22 of the family has tested positive for covid.

## 2021-02-01 NOTE — PROGRESS NOTES
Hospitalist Progress Note      PCP: Lennox Sayres, MD    Date of Admission: 1/30/2021    Chief Complaint: short of breath    Subjective:  Improving symptoms. No SOB. Increased appetite and energy. Improving O2 requirement. Medications:  Reviewed    Infusion Medications    sodium chloride       Scheduled Medications    sodium chloride flush  10 mL Intravenous 2 times per day    enoxaparin  30 mg Subcutaneous BID    methylPREDNISolone  40 mg Intravenous Q12H    remdesivir IVPB  100 mg Intravenous Q24H     PRN Meds: sodium chloride flush, promethazine **OR** ondansetron, polyethylene glycol, acetaminophen **OR** acetaminophen, guaiFENesin-dextromethorphan, sodium chloride, sodium chloride      Intake/Output Summary (Last 24 hours) at 2/1/2021 1131  Last data filed at 2/1/2021 0943  Gross per 24 hour   Intake 1592.92 ml   Output 1360 ml   Net 232.92 ml       Physical Exam Performed:    BP (!) 156/86   Pulse 70   Temp 97.2 °F (36.2 °C) (Oral)   Resp 18   Ht 5' 10\" (1.778 m)   Wt 173 lb 11.2 oz (78.8 kg)   SpO2 94%   BMI 24.92 kg/m²   I performed an audiovisual assessment, based on new provisions and guidance offered by [facility] on March 18, 2020 in setting of COVID-19 outbreak and in order to preserve personal protective equipment in accordance with the flexibilities announced by CMS on March 30, 2020. References:   https://med. [email or web address]/[email or web address]/Resources/COVID-19/3_18%20Telemed%20Guidance%20Updated%20March%2018. pdf?dkt=[email or web address]   [email or web address] pdf     Bedside physical examination deferred. However, I did visually inspect the patient and observed the following:     General appearance: No apparent distress, appears stated age and cooperative. Neck: Deferred. Respiratory:  Normal respiratory effort. Cardiovascular: Deferred. Abdomen: Deferred. Musculoskelatal: Deferred. Neurologic:  Deferred. Psychiatric: Alert and oriented, thought content appropriate, normal insight  Skin: Deferred. Labs:   Recent Labs     01/30/21 2249 02/01/21  0604   WBC 4.7 5.6   HGB 14.0 14.3   HCT 40.8 41.6    240     Recent Labs     01/30/21 2249 02/01/21  0604   * 135*   K 3.7 3.6   CL 95* 102   CO2 21 23   BUN 16 22*   CREATININE 1.2 1.0   CALCIUM 8.3 8.6     Recent Labs     01/30/21 2249 02/01/21  0604   AST 43* 34   ALT 30 36   BILITOT 0.7 0.6   ALKPHOS 52 48     No results for input(s): INR in the last 72 hours. Recent Labs     01/30/21 2249   TROPONINI <0.01       Urinalysis:      Lab Results   Component Value Date    NITRU NEGATIVE 02/03/2011    BLOODU NEGATIVE 02/03/2011    SPECGRAV >=1.030 02/03/2011    GLUCOSEU NEGATIVE 02/03/2011       Radiology:  XR CHEST PORTABLE   Final Result   Left greater than right bibasilar parenchymal opacities are noted which could   be related to atelectasis/infiltrates. Assessment/Plan:    Active Hospital Problems    Diagnosis    Acute respiratory disease due to COVID-19 virus [U07.1, J06.9]     COVID-19  - diagnosed 1/20, symptomatic 1/26  - steroids, remdesivir started 1/31. Given convalescent plasma 1/31.  - Continue O2 as needed; wean to RA as tolerated.      Constipation: Miralax PRN    DVT Prophylaxis: enoxaparin at increased dose  Diet: DIET GENERAL;  Code Status: Full Code    PT/OT Eval Status: not indicated    Dispo - 1-2 days    Slava Weldon MD

## 2021-02-02 LAB — D DIMER: 312 NG/ML DDU (ref 0–229)

## 2021-02-02 PROCEDURE — 2580000003 HC RX 258: Performed by: INTERNAL MEDICINE

## 2021-02-02 PROCEDURE — 2500000003 HC RX 250 WO HCPCS: Performed by: INTERNAL MEDICINE

## 2021-02-02 PROCEDURE — 6360000002 HC RX W HCPCS: Performed by: INTERNAL MEDICINE

## 2021-02-02 PROCEDURE — 2700000000 HC OXYGEN THERAPY PER DAY

## 2021-02-02 PROCEDURE — 94761 N-INVAS EAR/PLS OXIMETRY MLT: CPT

## 2021-02-02 PROCEDURE — 85379 FIBRIN DEGRADATION QUANT: CPT

## 2021-02-02 PROCEDURE — 1200000000 HC SEMI PRIVATE

## 2021-02-02 RX ADMIN — METHYLPREDNISOLONE SODIUM SUCCINATE 40 MG: 40 INJECTION, POWDER, LYOPHILIZED, FOR SOLUTION INTRAMUSCULAR; INTRAVENOUS at 05:07

## 2021-02-02 RX ADMIN — METHYLPREDNISOLONE SODIUM SUCCINATE 40 MG: 40 INJECTION, POWDER, LYOPHILIZED, FOR SOLUTION INTRAMUSCULAR; INTRAVENOUS at 17:09

## 2021-02-02 RX ADMIN — ENOXAPARIN SODIUM 30 MG: 30 INJECTION SUBCUTANEOUS at 19:42

## 2021-02-02 RX ADMIN — SODIUM CHLORIDE, PRESERVATIVE FREE 10 ML: 5 INJECTION INTRAVENOUS at 19:42

## 2021-02-02 RX ADMIN — ENOXAPARIN SODIUM 30 MG: 30 INJECTION SUBCUTANEOUS at 07:53

## 2021-02-02 RX ADMIN — SODIUM CHLORIDE, PRESERVATIVE FREE 10 ML: 5 INJECTION INTRAVENOUS at 07:53

## 2021-02-02 RX ADMIN — REMDESIVIR 100 MG: 100 INJECTION, POWDER, LYOPHILIZED, FOR SOLUTION INTRAVENOUS at 05:07

## 2021-02-02 NOTE — PROGRESS NOTES
Pt assessment completed and charted. VSS. Pt a/o. Pt on 2L NC. O2 sat 92. Medications given per MAR. Pt denies any pain at this time. Bed in lowest position and wheels locked. Call light within reach. Bedside table within reach. Non-skid footwear in place. Pt denies any other needs at this time. Pt calls out appropriately. Will continue to monitor.

## 2021-02-02 NOTE — CARE COORDINATION
Chart reviewed. Care managed per IM. Currently on 1LNC O2. Ambulatory in room. From home. IPTA.  Marco Irving RN

## 2021-02-02 NOTE — PROGRESS NOTES
Hospitalist Progress Note      PCP: Karol Dupree MD    Date of Admission: 1/30/2021    Chief Complaint: short of breath    Subjective:  Feels well. Minimal cough. No SOB. O2 down to 1 L by NC. Medications:  Reviewed    Infusion Medications    sodium chloride       Scheduled Medications    sodium chloride flush  10 mL Intravenous 2 times per day    enoxaparin  30 mg Subcutaneous BID    methylPREDNISolone  40 mg Intravenous Q12H    remdesivir IVPB  100 mg Intravenous Q24H     PRN Meds: sodium chloride flush, promethazine **OR** ondansetron, polyethylene glycol, acetaminophen **OR** acetaminophen, guaiFENesin-dextromethorphan, sodium chloride, sodium chloride      Intake/Output Summary (Last 24 hours) at 2/2/2021 1713  Last data filed at 2/2/2021 1449  Gross per 24 hour   Intake 1055 ml   Output 1726 ml   Net -671 ml       Physical Exam Performed:    BP (!) 149/88   Pulse 87   Temp 97.7 °F (36.5 °C) (Oral)   Resp 18   Ht 5' 10\" (1.778 m)   Wt 173 lb 9 oz (78.7 kg)   SpO2 94%   BMI 24.90 kg/m²   I performed an audiovisual assessment, based on new provisions and guidance offered by [facility] on March 18, 2020 in setting of COVID-19 outbreak and in order to preserve personal protective equipment in accordance with the flexibilities announced by CMS on March 30, 2020. References:   https://med. [place].[facility]/Portals/0/Resources/COVID-19/3_18%20Telemed%20Guidance%20Updated%20March%2018. pdf?[email or web address]   http://[employer]/. pdf     Bedside physical examination deferred. However, I did visually inspect the patient and observed the following:     General appearance: No apparent distress, appears stated age and cooperative. Neck: Deferred. Respiratory:  Normal respiratory effort. Cardiovascular: Deferred. Abdomen: Deferred. Musculoskelatal: Deferred. Neurologic:  Deferred. Psychiatric: Alert and oriented, thought content appropriate, normal insight  Skin: Deferred. Labs:   Recent Labs     01/30/21 2249 02/01/21  0604   WBC 4.7 5.6   HGB 14.0 14.3   HCT 40.8 41.6    240     Recent Labs     01/30/21 2249 02/01/21  0604   * 135*   K 3.7 3.6   CL 95* 102   CO2 21 23   BUN 16 22*   CREATININE 1.2 1.0   CALCIUM 8.3 8.6     Recent Labs     01/30/21 2249 02/01/21  0604   AST 43* 34   ALT 30 36   BILITOT 0.7 0.6   ALKPHOS 52 48     No results for input(s): INR in the last 72 hours. Recent Labs     01/30/21 2249   TROPONINI <0.01       Urinalysis:      Lab Results   Component Value Date    NITRU NEGATIVE 02/03/2011    BLOODU NEGATIVE 02/03/2011    SPECGRAV >=1.030 02/03/2011    GLUCOSEU NEGATIVE 02/03/2011       Radiology:  XR CHEST PORTABLE   Final Result   Left greater than right bibasilar parenchymal opacities are noted which could   be related to atelectasis/infiltrates. Assessment/Plan:    Active Hospital Problems    Diagnosis    Acute respiratory disease due to COVID-19 virus [U07.1, J06.9]     COVID-19  - diagnosed 1/20, symptomatic 1/26  - steroids, remdesivir started 1/31. Given convalescent plasma 1/31. - Improving O2 requirement; wean to RA as tolerated.      Constipation: Miralax PRN    DVT Prophylaxis: enoxaparin at increased dose  Diet: DIET GENERAL;  Dietary Nutrition Supplements: Standard High Calorie Oral Supplement  Code Status: Full Code    PT/OT Eval Status: not indicated    Dispo - 1-2 days once stable off O2    Carlos Walton MD

## 2021-02-02 NOTE — PROGRESS NOTES
Pt A&Ox4. VSS- currently pt weaned down to 1 L/min O2 with 94-95% saturation. Shift assessment completed. Pt ambulating without difficulty or assistive device in room. Denies pain or other needs. Call light within reach, bed in lowest position, wheels locked.

## 2021-02-03 VITALS
OXYGEN SATURATION: 94 % | WEIGHT: 175.38 LBS | RESPIRATION RATE: 18 BRPM | HEIGHT: 70 IN | TEMPERATURE: 98.2 F | BODY MASS INDEX: 25.11 KG/M2 | HEART RATE: 83 BPM | DIASTOLIC BLOOD PRESSURE: 72 MMHG | SYSTOLIC BLOOD PRESSURE: 135 MMHG

## 2021-02-03 LAB
BLOOD CULTURE, ROUTINE: NORMAL
CULTURE, BLOOD 2: NORMAL

## 2021-02-03 PROCEDURE — 6360000002 HC RX W HCPCS: Performed by: INTERNAL MEDICINE

## 2021-02-03 PROCEDURE — 2580000003 HC RX 258: Performed by: INTERNAL MEDICINE

## 2021-02-03 PROCEDURE — 2500000003 HC RX 250 WO HCPCS: Performed by: INTERNAL MEDICINE

## 2021-02-03 RX ADMIN — SODIUM CHLORIDE, PRESERVATIVE FREE 10 ML: 5 INJECTION INTRAVENOUS at 09:12

## 2021-02-03 RX ADMIN — METHYLPREDNISOLONE SODIUM SUCCINATE 40 MG: 40 INJECTION, POWDER, LYOPHILIZED, FOR SOLUTION INTRAMUSCULAR; INTRAVENOUS at 05:37

## 2021-02-03 RX ADMIN — ENOXAPARIN SODIUM 30 MG: 30 INJECTION SUBCUTANEOUS at 09:11

## 2021-02-03 RX ADMIN — REMDESIVIR 100 MG: 100 INJECTION, POWDER, LYOPHILIZED, FOR SOLUTION INTRAVENOUS at 05:37

## 2021-02-03 NOTE — PROGRESS NOTES
Pt a/o. VSS. Weaned pt to ROOM AIR but pt is anxious about oxygen and saturation. Pt states he feels better and has his strength back. Pt denies n/v and pain at this time. Bed lcoked and in lowest position with bedside table within reach. Pt denies needs at this time and was stable when writer left room.

## 2021-02-03 NOTE — CARE COORDINATION
D/c order noted. Spoke with dr Kaushik Miramontes patient now on RA. Spoke with Yvonne SALDAÑA. Family to be here at 4pm to pick it up.  Silas Wiggins RN

## 2021-02-03 NOTE — DISCHARGE SUMMARY
Hospital Medicine Discharge Summary    Patient: Gely Do     Age: 78 y.o. Gender: male  : 1940   MRN: [id number]  Code Status: Full     Admit Date: 2021   Discharge Date: 2/3/2021    Disposition:  Home     Condition at Discharge: Stable    Primary Care Provider: Evelyn Shepard MD    Admitting Physician: Jerzy Ocasio MD  Discharge Physician: Shama Brizuela MD       Discharge Diagnoses: Active Hospital Problems    Diagnosis    Acute respiratory disease due to COVID-19 virus [U07.1, J06.9]       Hospital Course:   77 yo M presented with COVID-19 Pneumonia. Assessment/Plan:    COVID-19 Pneumonia with Acute Respiratory Failure with Hypoxia:   - diagnosed , symptomatic   - steroids, remdesivir started . Given convalescent plasma . - Improved O2 requirement. Constipation: Miralax PRN        Exam:   /72   Pulse 83   Temp 98.2 °F (36.8 °C) (Oral)   Resp 18   Ht 5' 10\" (1.778 m)   Wt 175 lb 6 oz (79.5 kg)   SpO2 94%   BMI 25.16 kg/m²   I performed an audiovisual assessment, based on new provisions and guidance offered by [facility] on 2020 in setting of COVID-19 outbreak and in order to preserve personal protective equipment in accordance with the flexibilities announced by CMS on 2020. References:   https://med. [place].[facility]/Portals/0/Resources/COVID-19/3_18%20Telemed%20Guidance%20Updated%20March%2018. pdf?ikd=[email or web address]   http://[employer]/. pdf     Bedside physical examination deferred. However, I did visually inspect the patient and observed the following:     General appearance: No apparent distress, appears stated age and cooperative. Neck: Deferred. Respiratory:  Normal respiratory effort. Cardiovascular: Deferred. Abdomen: Deferred. Musculoskelatal: Deferred. Neurologic:  Deferred. Psychiatric: Alert and oriented, thought content appropriate, normal insight  Skin: Deferred. Patient Discharge Instructions: Follow up:  1. Primary Care Provider Colin Artis MD in the next 1-2 weeks. Discharge Medications:   Discharge Medication List as of 2/3/2021  2:06 PM        Discharge Medication List as of 2/3/2021  2:06 PM        Discharge Medication List as of 2/3/2021  2:06 PM      CONTINUE these medications which have NOT CHANGED    Details   Multiple Vitamins-Minerals (MENS MULTIVITAMIN PLUS PO) Take by mouthHistorical Med           Discharge Medication List as of 2/3/2021  2:06 PM            Significant Test Results    No results found. Consults:     IP CONSULT TO HOSPITALIST  IP CONSULT TO PHARMACY    Labs: For convenience and continuity at follow-up the following most recent labs are provided:    Lab Results   Component Value Date    WBC 5.6 02/01/2021    HGB 14.3 02/01/2021    HCT 41.6 02/01/2021    MCV 87.3 02/01/2021     02/01/2021     02/01/2021    K 3.6 02/01/2021     02/01/2021    CO2 23 02/01/2021    BUN 22 02/01/2021    CREATININE 1.0 02/01/2021    CALCIUM 8.6 02/01/2021    TROPONINI <0.01 01/30/2021    ALKPHOS 48 02/01/2021    ALT 36 02/01/2021    AST 34 02/01/2021    BILITOT 0.6 02/01/2021    LABALBU 3.4 02/01/2021    LDLCALC 110 05/10/2017    TRIG 78 05/10/2017     No results found for: INR      The patient was seen and examined on day of discharge and this discharge summary is in conjunction with any daily progress note from day of discharge. Time spent on discharge is more than 30 minutes in the examination, evaluation, counseling and review of medications and discharge plan. Signed:    David Turner MD   3/4/2021    Thank you Colin Artis MD for the opportunity to be involved in this patient's care. If you have any questions or concerns please feel free to contact my office [phone].

## 2021-02-04 ENCOUNTER — CARE COORDINATION (OUTPATIENT)
Dept: CASE MANAGEMENT | Age: 81
End: 2021-02-04

## 2021-02-04 DIAGNOSIS — U07.1 ACUTE RESPIRATORY DISEASE DUE TO COVID-19 VIRUS: Primary | ICD-10-CM

## 2021-02-04 DIAGNOSIS — J06.9 ACUTE RESPIRATORY DISEASE DUE TO COVID-19 VIRUS: Primary | ICD-10-CM

## 2021-02-04 PROCEDURE — 1111F DSCHRG MED/CURRENT MED MERGE: CPT | Performed by: FAMILY MEDICINE

## 2021-02-04 NOTE — PROGRESS NOTES
Physician Progress Note      Michael Sosa  CSN #:                  [id number]  :                       1940  ADMIT DATE:       2021 10:37 PM  [address] DATE:        2/3/2021 4:30 PM  RESPONDING  PROVIDER #:        Rosemarie Bustillos MD          QUERY TEXT:    Patient admitted with COVID - 19, with fever and tachycardia on arrival.   Documentation reflects \"likely viral etiology of sepsis\" in ER provider note. If possible, please document in the progress notes and discharge summary if   viral sepsis was: The medical record reflects the following:  Risk Factors: 78 y.o. male w/ COVID + 10 days prior to admission  Clinical Indicators: ER notes: \" Patient is also found to be quite febrile at   103.1 and a mildly tachycardic and tachypneic on arrival as well fitting the   picture of sepsis. However the patient has a known viral diagnosis. Blood   work demonstrates no leukocytosis, lactate elevation, troponin elevation or   procalcitonin elevation or even BNP elevation. As such we will hold off on   antibiotics for now given likely viral etiology of sepsis with known   COVID-19. \"    on arrival, , tmax 103.1, rr 24-32, WBC 4.7 no bands, LA 1.3  Treatment: labs, imaging, supplemental oxygen, convalescent plasma,   remdesivir, supportive care and monitoring    Thank you,  Raad De Jesus RN [facility]  [phone]  Options provided:  -- viral sepsis, present on arrival, confirmed after study  -- viral sepsis ruled out after study  -- Other - I will add my own diagnosis  -- Disagree - Not applicable / Not valid  -- Disagree - Clinically unable to determine / Unknown  -- Refer to Clinical Documentation Reviewer    PROVIDER RESPONSE TEXT:    viral sepsis ruled out after study. Query created by:  Philomena Lundy on 2021 2:43 PM      Electronically signed by:  Rosemarie Bustillos MD 2021 8:39 AM

## 2021-02-04 NOTE — CARE COORDINATION
[facility] 45 Transitions Initial Follow Up Call    Call within 2 business days of discharge: Yes    Patient: Pati Gallardo Patient : 1940   MRN: [id number]  Reason for Admission: ARF d/t COVID  Discharge Date: 2/3/21 RARS: Readmission Risk Score: 10      Last Discharge [address] [address]       Complaint Diagnosis Description Type Department Provider    21 Shortness of Breath Acute hypoxemic respiratory failure due to COVID-19 [facility]) . .. ED to Hosp-Admission (Discharged) (ADMITTED) Camelia Atwood MD; Kitty Ybarra. .. Spoke with: Received permission from patient to speak with spouse [address]: [facility]       Challenges to be reviewed by the provider   Additional needs identified to be addressed with provider No  none    Discussed COVID-19 related testing which was available at this time. Test results were positive. Patient informed of results, if available? Yes         Method of communication with provider : none    Advance Care Planning:   Does patient have an Advance Directive:  not on file. Was this a readmission? No  Patient stated reason for admission: sob   Patients top risk factors for readmission: medical condition    Care Transition Nurse (CTN) contacted the family by telephone to perform post hospital discharge assessment. Verified name and  with family as identifiers. Provided introduction to self, and explanation of the CTN role. CTN reviewed discharge instructions, medical action plan and red flags with family who verbalized understanding. Family given an opportunity to ask questions and does not have any further questions or concerns at this time. Were discharge instructions available to patient? Yes. Reviewed appropriate site of care based on symptoms and resources available to patient including: PCP and When to call 911. The family agrees to contact the PCP office for questions related to their healthcare.      Medication reconciliation was performed with family, who verbalizes understanding of administration of home medications. Advised obtaining a 90-day supply of all daily and as-needed medications. Covid Risk Education    Patient has following risk factors of: no known risk factors. Education provided regarding infection prevention, and signs and symptoms of COVID-19 and when to seek medical attention with family who verbalized understanding. Discussed exposure protocols and quarantine From [facility]: Are you at higher risk for severe illness?   and given an opportunity for questions and concerns. The family agrees to contact the COVID-19 hotline [phone] or PCP office for questions related to COVID-19. For more information on steps you can take to protect yourself, see CDC's How to Protect Yourself       Discussed follow-up appointments. If no appointment was previously scheduled, appointment scheduling offered: Yes. Is follow up appointment scheduled within 7 days of discharge? No  Non[facility] follow up appointment(s):     Spoke with patient's spouse Tru Toribio who reported patient was doing well and denied any issues with breathing or cp. Patient up this morning and already had breakfast and coffee. Medications reviewed with spouse. CTN encouraged spouse to reach out to PCP office and setup follow up apt. Denies any acute needs at present time. Agreeable to f/u calls. Educated on the use of urgent care or physicians 24 hr access line if assistance is needed after hours. Plan for follow-up call in 5-7 days based on severity of symptoms and risk factors. CTN provided contact information for future needs.           Care Transitions 24 Hour Call    Do you have any ongoing symptoms?: No  Do you have a copy of your discharge instructions?: Yes  Do you have all of your prescriptions and are they filled?: Yes  Have you been contacted by a [facility] Pharmacist?: No  Have you scheduled your follow up appointment?: No  Were you discharged with any Home Care

## 2021-02-05 NOTE — PROGRESS NOTES
Physician Progress Note      Kenyon BARAJAS #:                  [id number]  :                       1940  ADMIT DATE:       2021 10:37 PM  [address] DATE:        2/3/2021 4:30 PM  RESPONDING  PROVIDER #:        Melissa Jaime MD          QUERY TEXT:    Patient admitted with COVID-19, noted to have CXR findings for \"Left greater   than right bibasilar parenchymal opacities\" per radiology. If possible, please   document in progress notes and discharge summary if you are evaluating and/or   treating any of the following: The medical record reflects the following:  Risk Factors: 78 y.o. male w/ COVID+ 10 days prior to admission  Clinical Indicators: per ER provider, pt with acute respiratory failure and   hypoxia, noted w/ desat 84% on room air, pulm assessment: LUNGS: Moderate   tachypnea, rhonchi throughout, no wheezing, +coughing paroxysms  h/p: CXR showing patchy bibasilar opacities, labs showing LDH , D-dimer a 58,   fibrinogen 858  Patient has severe disease based on sats 84% on room air  -IV antibiotics : hold, low suspicion of concomitant bacterial pneumonia  Treatment: supplemental oxygen, labs, imaging, convalescent plasma,   remdesivir, IV solu-medrol, supportive care and monitoring    thank you,  Yamilex Hein RN CDS  [phone]  Options provided:  -- viral pneumonia  -- CXR findings represent atelectasis  -- Other - I will add my own diagnosis  -- Disagree - Not applicable / Not valid  -- Disagree - Clinically unable to determine / Unknown  -- Refer to Clinical Documentation Reviewer    PROVIDER RESPONSE TEXT:    COVID-19 Pneumonia    Query created by:  Minnie Ross on 2021 2:48 PM      Electronically signed by:  Melissa Jaime MD 2021 4:42 PM

## 2021-02-09 ENCOUNTER — VIRTUAL VISIT (OUTPATIENT)
Dept: FAMILY MEDICINE CLINIC | Age: 81
End: 2021-02-09
Payer: MEDICARE

## 2021-02-09 DIAGNOSIS — U07.1 COVID-19: Primary | ICD-10-CM

## 2021-02-09 DIAGNOSIS — J18.9 PNEUMONIA DUE TO INFECTIOUS ORGANISM, UNSPECIFIED LATERALITY, UNSPECIFIED PART OF LUNG: ICD-10-CM

## 2021-02-09 PROCEDURE — 99214 OFFICE O/P EST MOD 30 MIN: CPT | Performed by: FAMILY MEDICINE

## 2021-02-09 PROCEDURE — 1123F ACP DISCUSS/DSCN MKR DOCD: CPT | Performed by: FAMILY MEDICINE

## 2021-02-09 PROCEDURE — 4040F PNEUMOC VAC/ADMIN/RCVD: CPT | Performed by: FAMILY MEDICINE

## 2021-02-09 PROCEDURE — 1111F DSCHRG MED/CURRENT MED MERGE: CPT | Performed by: FAMILY MEDICINE

## 2021-02-09 PROCEDURE — G8427 DOCREV CUR MEDS BY ELIG CLIN: HCPCS | Performed by: FAMILY MEDICINE

## 2021-02-09 ASSESSMENT — PATIENT HEALTH QUESTIONNAIRE - PHQ9
SUM OF ALL RESPONSES TO PHQ QUESTIONS 1-9: 0
SUM OF ALL RESPONSES TO PHQ QUESTIONS 1-9: 0
SUM OF ALL RESPONSES TO PHQ9 QUESTIONS 1 & 2: 0

## 2021-02-09 NOTE — PROGRESS NOTES
Gely Do (:  1940) is a 78 y.o. male,Established patient, here for evaluation of the following chief complaint(s): Follow-Up from Hospital (covid symptoms, pnuemonia )      ASSESSMENT/PLAN:  1. COVID-19  2. Pneumonia due to infectious organism, unspecified laterality, unspecified part of lung  Recovering, doing well. No follow-ups on file. SUBJECTIVE/OBJECTIVE:  Gely Do is a 78 y.o. male. Patient presents with: Follow-Up from Hospital: covid symptoms, pnuemonia       Patient was in hospital with covid and pneumonia. Now doing much better. Still having some shortness of breath with activity. Overall feeling well. Was hospitalized for several days. Notes wife had this as well and is doing much better. Normal appetite now, no fevers, no chills. The patients PMH, surgical history, family history, medications, allergies were all reviewed and updated as appropriate today.         Review of Systems    Patient-Reported Vitals 2021   Patient-Reported Weight 175.5 lbs   Patient-Reported Height 5'10   Patient-Reported Temperature 97.5        Physical Exam    [INSTRUCTIONS:  \"[x]\" Indicates a positive item  \"[]\" Indicates a negative item  -- DELETE ALL ITEMS NOT EXAMINED]    Constitutional: [x] Appears well-developed and well-nourished [x] No apparent distress      [] Abnormal -     Mental status: [x] Alert and awake  [x] Oriented to person/place/time [x] Able to follow commands    [] Abnormal -     Eyes:   EOM    [x]  Normal    [] Abnormal -   Sclera  [x]  Normal    [] Abnormal -          Discharge [x]  None visible   [] Abnormal -     HENT: [x] Normocephalic, atraumatic  [] Abnormal -   [x] Mouth/Throat: Mucous membranes are moist    External Ears [x] Normal  [] Abnormal -    Neck: [x] No visualized mass [] Abnormal -     Pulmonary/Chest: [x] Respiratory effort normal   [x] No visualized signs of difficulty breathing or respiratory distress        [] Abnormal - Musculoskeletal:   [x] Normal gait with no signs of ataxia         [x] Normal range of motion of neck        [] Abnormal -     Neurological:        [x] No Facial Asymmetry (Cranial nerve 7 motor function) (limited exam due to video visit)          [x] No gaze palsy        [] Abnormal -          Skin:        [x] No significant exanthematous lesions or discoloration noted on facial skin         [] Abnormal -            Psychiatric:       [x] Normal Affect [] Abnormal -        [x] No Hallucinations    Other pertinent observable physical exam findings:-          On this date 02/21/21 I have spent 30 minutes reviewing previous notes, test results and face to face (virtual) with the patient discussing the diagnosis and importance of compliance with the treatment plan as well as documenting on the day of the visit. Kasia Clark is a 78 y.o. male being evaluated by a Virtual Visit (video visit) encounter to address concerns as mentioned above. A caregiver was present when appropriate. Due to this being a TeleHealth encounter (During [place] [address] public health emergency), evaluation of the following organ systems was limited: Vitals/Constitutional/EENT/Resp/CV/GI//MS/Neuro/Skin/Heme-Lymph-Imm. Pursuant to the emergency declaration under the [facility], [address] [place] [address] authority and the [employer] Act, this Virtual Visit was conducted with patient's (and/or legal guardian's) consent, to reduce the patient's risk of exposure to COVID-19 and provide necessary medical care. The patient (and/or legal guardian) has also been advised to contact this office for worsening conditions or problems, and seek emergency medical treatment and/or call 911 if deemed necessary.      Patient identification was verified at the start of the visit: Yes Services were provided through a video synchronous discussion virtually to substitute for in-person clinic visit. Patient was located at home and provider was located in office or at home. An electronic signature was used to authenticate this note.     --Mikala Og MD

## 2021-02-11 ENCOUNTER — CARE COORDINATION (OUTPATIENT)
Dept: CASE MANAGEMENT | Age: 81
End: 2021-02-11

## 2021-02-11 NOTE — CARE COORDINATION
[facility] Transitions Follow Up Call    2021    Patient: Masood Vieyra  Patient : 1940   MRN: [id number]  Reason for Admission: COVID-19   Discharge Date: 2/3/21 RARS: Readmission Risk Score: 10         Spoke with: Masood Vieyra    Spoke with patient who reported he is doing well. Patient reported he feels his breathing has improved and was even able to shovel his driveway last night with help from his neighbor. Patient reported he had follow up with PCP and was everything is going well. Patient reported he was having some anxiety related to work and PCP said also could be related to steroids patient was taking and may take some time for the steroids to fully leave his system. Denies any acute needs at present time. Agreeable to f/u calls. Educated on the use of urgent care or physicians 24 hr access line if assistance is needed after hours. Care Transitions Subsequent and Final Call    Subsequent and Final Calls  Do you have any ongoing symptoms?: No  Have your medications changed?: No  Do you have any questions related to your medications?: No  Do you currently have any active services?: No  Do you have any needs or concerns that I can assist you with?: No  Identified Barriers: None  Care Transitions Interventions  Other Interventions: Follow Up  No future appointments.     Cece Ortiz RN

## 2021-02-18 ENCOUNTER — CARE COORDINATION (OUTPATIENT)
Dept: CASE MANAGEMENT | Age: 81
End: 2021-02-18

## 2021-02-18 NOTE — CARE COORDINATION
[facility] Transitions Follow Up Call    2021    Patient: Sang Topete  Patient : 1940   MRN: [id number]  Reason for Admission:Covid-19   Discharge Date: 2/3/21 RARS: Readmission Risk Score: 10         Spoke with: Sang Topete    Spoke with patient who reported he is doing well. Patient reported his breathing is stable at this time. Patient reported his weight has dropped from 175 lbs to 169 lbs since discharge and patient denied any issues with appetite. CTN discussed concerns and encouraged patient to try boost or ensure and if continues to have weight loss he needs to reach out to his PCP. Patient verbalized understanding and denied any further questions or concerns at this time. CTN advised patient of use of urgent care or physicians 24 hr access line if assistance is needed after hours. Also advised that they can always contact their home care provider to request a nurse visit even if it isn't their regularly scheduled day for their nurse to visit. Care Transitions Subsequent and Final Call    Subsequent and Final Calls  Do you have any ongoing symptoms?: No  Have your medications changed?: No  Do you have any questions related to your medications?: No  Do you currently have any active services?: No  Do you have any needs or concerns that I can assist you with?: No  Identified Barriers: None  Care Transitions Interventions  Other Interventions: Follow Up  No future appointments.     Kathie Balbuena RN

## 2021-02-25 ENCOUNTER — OFFICE VISIT (OUTPATIENT)
Dept: FAMILY MEDICINE CLINIC | Age: 81
End: 2021-02-25
Payer: MEDICARE

## 2021-02-25 VITALS
TEMPERATURE: 97.1 F | OXYGEN SATURATION: 98 % | SYSTOLIC BLOOD PRESSURE: 152 MMHG | HEART RATE: 78 BPM | DIASTOLIC BLOOD PRESSURE: 68 MMHG | WEIGHT: 175 LBS | BODY MASS INDEX: 25.11 KG/M2

## 2021-02-25 DIAGNOSIS — L20.9 ATOPIC DERMATITIS, UNSPECIFIED TYPE: Primary | ICD-10-CM

## 2021-02-25 PROCEDURE — G8484 FLU IMMUNIZE NO ADMIN: HCPCS | Performed by: FAMILY MEDICINE

## 2021-02-25 PROCEDURE — 1123F ACP DISCUSS/DSCN MKR DOCD: CPT | Performed by: FAMILY MEDICINE

## 2021-02-25 PROCEDURE — G8427 DOCREV CUR MEDS BY ELIG CLIN: HCPCS | Performed by: FAMILY MEDICINE

## 2021-02-25 PROCEDURE — 4040F PNEUMOC VAC/ADMIN/RCVD: CPT | Performed by: FAMILY MEDICINE

## 2021-02-25 PROCEDURE — 1036F TOBACCO NON-USER: CPT | Performed by: FAMILY MEDICINE

## 2021-02-25 PROCEDURE — 1111F DSCHRG MED/CURRENT MED MERGE: CPT | Performed by: FAMILY MEDICINE

## 2021-02-25 PROCEDURE — G8417 CALC BMI ABV UP PARAM F/U: HCPCS | Performed by: FAMILY MEDICINE

## 2021-02-25 PROCEDURE — 99213 OFFICE O/P EST LOW 20 MIN: CPT | Performed by: FAMILY MEDICINE

## 2021-02-25 RX ORDER — TRIAMCINOLONE ACETONIDE 1 MG/G
CREAM TOPICAL
Qty: 15 G | Refills: 0 | Status: SHIPPED | OUTPATIENT
Start: 2021-02-25

## 2021-02-25 ASSESSMENT — PATIENT HEALTH QUESTIONNAIRE - PHQ9
SUM OF ALL RESPONSES TO PHQ QUESTIONS 1-9: 1
1. LITTLE INTEREST OR PLEASURE IN DOING THINGS: 0
SUM OF ALL RESPONSES TO PHQ QUESTIONS 1-9: 1
SUM OF ALL RESPONSES TO PHQ QUESTIONS 1-9: 1
2. FEELING DOWN, DEPRESSED OR HOPELESS: 1

## 2021-03-13 ASSESSMENT — ENCOUNTER SYMPTOMS: COUGH: 0

## 2021-03-13 NOTE — PROGRESS NOTES
Behavior: Behavior normal.         Thought Content: Thought content normal.         Judgment: Judgment normal.               An electronic signature was used to authenticate this note.     --Kristina Burrows MD

## 2021-04-14 ENCOUNTER — TELEPHONE (OUTPATIENT)
Dept: FAMILY MEDICINE CLINIC | Age: 81
End: 2021-04-14

## 2021-11-23 ENCOUNTER — NURSE ONLY (OUTPATIENT)
Dept: FAMILY MEDICINE CLINIC | Age: 81
End: 2021-11-23
Payer: MEDICARE

## 2021-11-23 DIAGNOSIS — Z23 NEED FOR PROPHYLACTIC VACCINATION AND INOCULATION AGAINST INFLUENZA: Primary | ICD-10-CM

## 2021-11-23 PROCEDURE — 90694 VACC AIIV4 NO PRSRV 0.5ML IM: CPT | Performed by: FAMILY MEDICINE

## 2021-11-23 PROCEDURE — G0008 ADMIN INFLUENZA VIRUS VAC: HCPCS | Performed by: FAMILY MEDICINE

## 2022-12-15 ENCOUNTER — OFFICE VISIT (OUTPATIENT)
Dept: FAMILY MEDICINE CLINIC | Age: 82
End: 2022-12-15
Payer: MEDICARE

## 2022-12-15 VITALS
BODY MASS INDEX: 24.91 KG/M2 | HEART RATE: 78 BPM | HEIGHT: 70 IN | DIASTOLIC BLOOD PRESSURE: 58 MMHG | WEIGHT: 174 LBS | SYSTOLIC BLOOD PRESSURE: 138 MMHG | OXYGEN SATURATION: 98 %

## 2022-12-15 DIAGNOSIS — Z13.220 SCREENING, LIPID: ICD-10-CM

## 2022-12-15 DIAGNOSIS — Z00.00 MEDICARE ANNUAL WELLNESS VISIT, SUBSEQUENT: ICD-10-CM

## 2022-12-15 DIAGNOSIS — Z12.5 SCREENING FOR MALIGNANT NEOPLASM OF PROSTATE: ICD-10-CM

## 2022-12-15 DIAGNOSIS — Z00.00 MEDICARE ANNUAL WELLNESS VISIT, SUBSEQUENT: Primary | ICD-10-CM

## 2022-12-15 LAB
A/G RATIO: 1.9 (ref 1.1–2.2)
ALBUMIN SERPL-MCNC: 4.4 G/DL (ref 3.4–5)
ALP BLD-CCNC: 67 U/L (ref 40–129)
ALT SERPL-CCNC: 15 U/L (ref 10–40)
ANION GAP SERPL CALCULATED.3IONS-SCNC: 12 MMOL/L (ref 3–16)
AST SERPL-CCNC: 16 U/L (ref 15–37)
BILIRUB SERPL-MCNC: 0.7 MG/DL (ref 0–1)
BUN BLDV-MCNC: 18 MG/DL (ref 7–20)
CALCIUM SERPL-MCNC: 9.3 MG/DL (ref 8.3–10.6)
CHLORIDE BLD-SCNC: 104 MMOL/L (ref 99–110)
CHOLESTEROL, FASTING: 174 MG/DL (ref 0–199)
CO2: 23 MMOL/L (ref 21–32)
CREAT SERPL-MCNC: 1.2 MG/DL (ref 0.8–1.3)
GFR SERPL CREATININE-BSD FRML MDRD: 60 ML/MIN/{1.73_M2}
GLUCOSE BLD-MCNC: 124 MG/DL (ref 70–99)
HDLC SERPL-MCNC: 61 MG/DL (ref 40–60)
LDL CHOLESTEROL CALCULATED: 104 MG/DL
POTASSIUM SERPL-SCNC: 4.3 MMOL/L (ref 3.5–5.1)
PROSTATE SPECIFIC ANTIGEN: 2.32 NG/ML (ref 0–4)
SODIUM BLD-SCNC: 139 MMOL/L (ref 136–145)
TOTAL PROTEIN: 6.7 G/DL (ref 6.4–8.2)
TRIGLYCERIDE, FASTING: 47 MG/DL (ref 0–150)
VLDLC SERPL CALC-MCNC: 9 MG/DL

## 2022-12-15 PROCEDURE — G8484 FLU IMMUNIZE NO ADMIN: HCPCS | Performed by: FAMILY MEDICINE

## 2022-12-15 PROCEDURE — 1123F ACP DISCUSS/DSCN MKR DOCD: CPT | Performed by: FAMILY MEDICINE

## 2022-12-15 PROCEDURE — G0439 PPPS, SUBSEQ VISIT: HCPCS | Performed by: FAMILY MEDICINE

## 2022-12-15 ASSESSMENT — PATIENT HEALTH QUESTIONNAIRE - PHQ9
1. LITTLE INTEREST OR PLEASURE IN DOING THINGS: 0
SUM OF ALL RESPONSES TO PHQ QUESTIONS 1-9: 0
2. FEELING DOWN, DEPRESSED OR HOPELESS: 0
SUM OF ALL RESPONSES TO PHQ9 QUESTIONS 1 & 2: 0

## 2022-12-15 ASSESSMENT — LIFESTYLE VARIABLES
HOW MANY STANDARD DRINKS CONTAINING ALCOHOL DO YOU HAVE ON A TYPICAL DAY: 1 OR 2
HOW OFTEN DO YOU HAVE A DRINK CONTAINING ALCOHOL: MONTHLY OR LESS

## 2022-12-15 NOTE — PATIENT INSTRUCTIONS
Preventing Falls: Care Instructions  Overview     Getting around your home safely can be a challenge if you have injuries or health problems that make it easy for you to fall. Loose rugs and furniture in walkways are among the dangers for many older people who have problems walking or who have poor eyesight. People who have conditions such as arthritis, osteoporosis, or dementia also have to be careful not to fall. You can make your home safer with a few simple measures. Follow-up care is a key part of your treatment and safety. Be sure to make and go to all appointments, and call your doctor if you are having problems. It's also a good idea to know your test results and keep a list of the medicines you take. How can you care for yourself at home? Taking care of yourself  Exercise regularly to improve your strength, muscle tone, and balance. Walk if you can. Swimming may be a good choice if you cannot walk easily. Have your vision and hearing checked each year or any time you notice a change. If you have trouble seeing and hearing, you might not be able to avoid objects and could lose your balance. Know the side effects of the medicines you take. Ask your doctor or pharmacist whether the medicines you take can affect your balance. Sleeping pills or sedatives can affect your balance. Limit the amount of alcohol you drink. Alcohol can impair your balance and other senses. Ask your doctor whether calluses or corns on your feet need to be removed. If you wear loose-fitting shoes because of calluses or corns, you can lose your balance and fall. Talk to your doctor if you have numbness in your feet. You may get dizzy if you do not drink enough water. To prevent dehydration, drink plenty of fluids. Choose water and other clear liquids. If you have kidney, heart, or liver disease and have to limit fluids, talk with your doctor before you increase the amount of fluids you drink.   Preventing falls at home  Remove raised doorway thresholds, throw rugs, and clutter. Repair loose carpet or raised areas in the floor. Move furniture and electrical cords to keep them out of walking paths. Use nonskid floor wax, and wipe up spills right away, especially on ceramic tile floors. If you use a walker or cane, put rubber tips on it. If you use crutches, clean the bottoms of them regularly with an abrasive pad, such as steel wool. Keep your house well lit, especially stairways, porches, and outside walkways. Use night-lights in areas such as hallways and bathrooms. Add extra light switches or use remote switches (such as switches that go on or off when you clap your hands) to make it easier to turn lights on if you have to get up during the night. Install sturdy handrails on stairways. Move items in your cabinets so that the things you use a lot are on the lower shelves (about waist level). Keep a cordless phone and a flashlight with new batteries by your bed. If possible, put a phone in each of the main rooms of your house, or carry a cell phone in case you fall and cannot reach a phone. Or, you can wear a device around your neck or wrist. You push a button that sends a signal for help. Wear low-heeled shoes that fit well and give your feet good support. Use footwear with nonskid soles. Check the heels and soles of your shoes for wear. Repair or replace worn heels or soles. Do not wear socks without shoes on smooth floors, such as wood. Walk on the grass when the sidewalks are slippery. If you live in an area that gets snow and ice in the winter, sprinkle salt on slippery steps and sidewalks. Or ask a family member or friend to do this for you. Preventing falls in the bath  Install grab bars and nonskid mats inside and outside your shower or tub and near the toilet and sinks. Use shower chairs and bath benches. Use a hand-held shower head that will allow you to sit while showering.   Get into a tub or shower by putting the weaker leg in first. Get out of a tub or shower with your strong side first.  Repair loose toilet seats and consider installing a raised toilet seat to make getting on and off the toilet easier. Keep your bathroom door unlocked while you are in the shower. Where can you learn more? Go to http://www.house.com/ and enter G117 to learn more about \"Preventing Falls: Care Instructions. \"  Current as of: May 4, 2022               Content Version: 13.5  © 1926-4167 Healthwise, [employer]. Care instructions adapted under license by [facility] ([facility]). If you have questions about a medical condition or this instruction, always ask your healthcare professional. [address] any warranty or liability for your use of this information. Hearing Loss: Care Instructions  Overview     Hearing loss is a sudden or slow decrease in how well you hear. It can range from mild to severe. Permanent hearing loss can occur with aging. It also can happen when you are exposed long-term to loud noise. Examples include listening to loud music, riding motorcycles, or being around other loud machines. Hearing loss can affect your work and home life. It can make you feel lonely or depressed. You may feel that you have lost your independence. But hearing aids and other devices can help you hear better and feel connected to others. Follow-up care is a key part of your treatment and safety. Be sure to make and go to all appointments, and call your doctor if you are having problems. It's also a good idea to know your test results and keep a list of the medicines you take. How can you care for yourself at home? Avoid loud noises whenever possible. This helps keep your hearing from getting worse. Always wear hearing protection around loud noises. Wear a hearing aid as directed. See a professional who can help you pick a hearing aid that fits you. Have hearing tests as your doctor suggests. They can show whether your hearing has changed. Your hearing aid may need to be adjusted. Use other devices as needed. These may include:  Telephone amplifiers and hearing aids that can connect to a television, stereo, radio, or microphone. Devices that use lights or vibrations. These alert you to the doorbell, a ringing telephone, or a baby monitor. Television closed-captioning. This shows the words at the bottom of the screen. Most new TVs can do this. TTY (text telephone). This lets you type messages back and forth on the telephone instead of talking or listening. These devices are also called TDD. When messages are typed on the keyboard, they are sent over the phone line to a receiving TTY. The message is shown on a monitor. Use text messaging, social media, and email if it is hard for you to communicate by telephone. Try to learn a listening technique called speechreading. It is not lipreading. You pay attention to people's gestures, expressions, posture, and tone of voice. These clues can help you understand what a person is saying. Face the person you are talking to, and have them face you. Make sure the lighting is good. You need to see the other person's face clearly. Think about counseling if you need help to adjust to your hearing loss. When should you call for help? Watch closely for changes in your health, and be sure to contact your doctor if:    You think your hearing is getting worse.     You have new symptoms, such as dizziness or nausea. Where can you learn more? Go to http://www.[employer].com/ and enter R798 to learn more about \"Hearing Loss: Care Instructions. \"  Current as of: May 4, 2022               Content Version: 13.5  © 4122-3684 Healthwise, Incorporated. Care instructions adapted under license by [facility] ([facility]).  If you have questions about a medical condition or this instruction, always ask your healthcare professional. Arash Donato any warranty or liability for your use of this information. Learning About Vision Tests  What are vision tests? The four most common vision tests are visual acuity tests, refraction, visual field tests, and color vision tests. Visual acuity (sharpness) tests  These tests are used: To see if you need glasses or contact lenses. To monitor an eye problem. To check an eye injury. Visual acuity tests are done as part of routine exams. You may also have this test when you get your 's license or apply for some types of jobs. Visual field tests  These tests are used: To check for vision loss in any area of your range of vision. To screen for certain eye diseases. To look for nerve damage after a stroke, head injury, or other problem that could reduce blood flow to the brain. Refraction and color tests  A refraction test is done to find the right prescription for glasses and contact lenses. A color vision test is done to check for color blindness. Color vision is often tested as part of a routine exam. You may also have this test when you apply for a job where recognizing different colors is important, such as , electronics, or the [employer]. How are vision tests done? Visual acuity test   You cover one eye at a time. You read aloud from a wall chart across the room. You read aloud from a small card that you hold in your hand. Refraction   You look into a special device. The device puts lenses of different strengths in front of each eye to see how strong your glasses or contact lenses need to be. Visual field tests   Your doctor may have you look through special machines. Or your doctor may simply have you stare straight ahead while they move a finger into and out of your field of vision. Color vision test   You look at pieces of printed test patterns in various colors. You say what number or symbol you see.   Your doctor may have you trace the number or symbol using a pointer. How do these tests feel? There is very little chance of having a problem from this test. If dilating drops are used for a vision test, they may make the eyes sting and cause a medicine taste in the mouth. Follow-up care is a key part of your treatment and safety. Be sure to make and go to all appointments, and call your doctor if you are having problems. It's also a good idea to know your test results and keep a list of the medicines you take. Where can you learn more? Go to http://www.house.com/ and enter G551 to learn more about \"Learning About Vision Tests. \"  Current as of: October 12, 2022               Content Version: 13.5  © 2006-2022 [employer]. Care instructions adapted under license by [facility] ([facility]). If you have questions about a medical condition or this instruction, always ask your healthcare professional. [place] [address] any warranty or liability for your use of this information. Advance Directives: Care Instructions  Overview  An advance directive is a legal way to state your wishes at the end of your life. It tells your family and your doctor what to do if you can't say what you want. There are two main types of advance directives. You can change them any time your wishes change. Living will. This form tells your family and your doctor your wishes about life support and other treatment. The form is also called a declaration. Medical power of . This form lets you name a person to make treatment decisions for you when you can't speak for yourself. This person is called a health care agent (health care proxy, health care surrogate). The form is also called a durable power of  for health care. If you do not have an advance directive, decisions about your medical care may be made by a family member, or by a doctor or a  who doesn't know you.   It may help to think of an advance directive as a gift to the people who care for you. If you have one, they won't have to make tough decisions by themselves. For more information, including forms for your state, see the [address] website (www.caringinfo.org/planning/advance-directives/). Follow-up care is a key part of your treatment and safety. Be sure to make and go to all appointments, and call your doctor if you are having problems. It's also a good idea to know your test results and keep a list of the medicines you take. What should you include in an advance directive? Many states have a unique advance directive form. (It may ask you to address specific issues.) Or you might use a universal form that's approved by many states. If your form doesn't tell you what to address, it may be hard to know what to include in your advance directive. Use the questions below to help you get started. Who do you want to make decisions about your medical care if you are not able to? What life-support measures do you want if you have a serious illness that gets worse over time or can't be cured? What are you most afraid of that might happen? (Maybe you're afraid of having pain, losing your independence, or being kept alive by machines.)  Where would you prefer to die? (Your home? A hospital? A nursing home?)  Do you want to donate your organs when you die? Do you want certain Sabianism practices performed before you die? When should you call for help? Be sure to contact your doctor if you have any questions. Where can you learn more? Go to http://www.house.com/ and enter R264 to learn more about \"Advance Directives: Care Instructions. \"  Current as of: June 16, 2022               Content Version: 13.5  © 2103-8400 Healthwise, [place]. Care instructions adapted under license by [address]  If you have questions about a medical condition or this instruction, always ask your healthcare professional. James Da Silva disclaims any warranty or liability for your use of this information. Personalized Preventive Plan for Linda Barton - 12/15/2022  Medicare offers a range of preventive health benefits. Some of the tests and screenings are paid in full while other may be subject to a deductible, co-insurance, and/or copay. Some of these benefits include a comprehensive review of your medical history including lifestyle, illnesses that may run in your family, and various assessments and screenings as appropriate. After reviewing your medical record and screening and assessments performed today your provider may have ordered immunizations, labs, imaging, and/or referrals for you. A list of these orders (if applicable) as well as your Preventive Care list are included within your After Visit Summary for your review. Other Preventive Recommendations:    A preventive eye exam performed by an eye specialist is recommended every 1-2 years to screen for glaucoma; cataracts, macular degeneration, and other eye disorders. A preventive dental visit is recommended every 6 months. Try to get at least 150 minutes of exercise per week or 10,000 steps per day on a pedometer . Order or download the FREE \"Exercise & Physical Activity: Your Everyday Guide\" from [employer]. Call 3-956.672.4941 or search [employer] online. You need 6053-4593 mg of calcium and 1728-2803 IU of vitamin D per day. It is possible to meet your calcium requirement with diet alone, but a vitamin D supplement is usually necessary to meet this goal.  When exposed to the sun, use a sunscreen that protects against both UVA and UVB radiation with an SPF of 30 or greater. Reapply every 2 to 3 hours or after sweating, drying off with a towel, or swimming. Always wear a seat belt when traveling in a car. Always wear a helmet when riding a bicycle or motorcycle.

## 2022-12-15 NOTE — PROGRESS NOTES
Medicare Annual Wellness Visit    Linda Barton is here for Medicare AWV    Assessment & Plan   Medicare annual wellness visit, subsequent  -     Comprehensive Metabolic Panel; Future  Screening for malignant neoplasm of prostate  -     PSA Screening; Future  Screening, lipid  -     Lipid, Fasting; Future    Recommendations for Preventive Services Due: see orders and patient instructions/AVS.  Recommended screening schedule for the next 5-10 years is provided to the patient in written form: see Patient Instructions/AVS.     No follow-ups on file. Subjective       Patient's complete Health Risk Assessment and screening values have been reviewed and are found in Flowsheets. The following problems were reviewed today and where indicated follow up appointments were made and/or referrals ordered. Positive Risk Factor Screenings with Interventions:    Fall Risk:  Do you feel unsteady or are you worried about falling? : (!) yes  2 or more falls in past year?: no  Fall with injury in past year?: no     Interventions:    See AVS for additional education material  See A/P for plan and any pertinent orders                Hearing Screen:  Do you or your family notice any trouble with your hearing that hasn't been managed with hearing aids?: (!) Yes    Interventions:  See AVS for additional education material  See A/P for any pertinent orders    Vision Screen:  Do you have difficulty driving, watching TV, or doing any of your daily activities because of your eyesight?: No  Have you had an eye exam within the past year?: (!) No  No results found. Interventions:   See AVS for additional education material  See A/P for any pertinent orders                      Objective   Vitals:    12/15/22 0939   BP: (!) 138/58   Pulse: 78   SpO2: 98%   Weight: 174 lb (78.9 kg)   Height: 5' 10\" (1.778 m)      Body mass index is 24.97 kg/m².       General Appearance: alert and oriented to person, place and time, well developed and well- nourished, in no acute distress  Skin: warm and dry, no rash or erythema  Head: normocephalic and atraumatic  Eyes: pupils equal, round, and reactive to light, extraocular eye movements intact, conjunctivae normal  ENT: tympanic membrane, external ear and ear canal normal bilaterally, nose without deformity, nasal mucosa and turbinates normal without polyps  Neck: supple and non-tender without mass, no thyromegaly or thyroid nodules, no cervical lymphadenopathy  Pulmonary/Chest: clear to auscultation bilaterally- no wheezes, rales or rhonchi, normal air movement, no respiratory distress  Cardiovascular: normal rate, regular rhythm, normal S1 and S2, no murmurs, rubs, clicks, or gallops, distal pulses intact, no carotid bruits  Abdomen: soft, non-tender, non-distended, normal bowel sounds, no masses or organomegaly  Extremities: no cyanosis, clubbing or edema  Musculoskeletal: normal range of motion, no joint swelling, deformity or tenderness  Neurologic: reflexes normal and symmetric, no cranial nerve deficit, gait, coordination and speech normal       No Known Allergies  Prior to Visit Medications    Medication Sig Taking?  Authorizing Provider   Multiple Vitamins-Minerals (MENS MULTIVITAMIN PLUS PO) Take by mouth Yes Historical Provider, MD Davis (Including outside providers/suppliers regularly involved in providing care):   Patient Care Team:  Yrn Nolan MD as PCP - General (Family Medicine)  Yrn Nolan MD as P[facility] Empaneled Provider     Reviewed and updated this visit:  Tobacco  Allergies  Meds  Problems  Med Hx  Surg Hx  Soc Hx  Fam Hx

## 2023-01-04 ENCOUNTER — PROCEDURE VISIT (OUTPATIENT)
Dept: FAMILY MEDICINE CLINIC | Age: 83
End: 2023-01-04
Payer: MEDICARE

## 2023-01-04 VITALS
HEART RATE: 80 BPM | SYSTOLIC BLOOD PRESSURE: 132 MMHG | WEIGHT: 172 LBS | DIASTOLIC BLOOD PRESSURE: 68 MMHG | BODY MASS INDEX: 24.62 KG/M2 | OXYGEN SATURATION: 93 % | HEIGHT: 70 IN

## 2023-01-04 DIAGNOSIS — R20.9 DISTURBANCE OF SKIN SENSATION: ICD-10-CM

## 2023-01-04 DIAGNOSIS — L91.8 SKIN TAG: Primary | ICD-10-CM

## 2023-01-04 DIAGNOSIS — Z23 NEED FOR PROPHYLACTIC VACCINATION AND INOCULATION AGAINST INFLUENZA: ICD-10-CM

## 2023-01-04 DIAGNOSIS — L82.0 INFLAMED SEBORRHEIC KERATOSIS: ICD-10-CM

## 2023-01-04 PROCEDURE — 1123F ACP DISCUSS/DSCN MKR DOCD: CPT | Performed by: FAMILY MEDICINE

## 2023-01-04 PROCEDURE — 99213 OFFICE O/P EST LOW 20 MIN: CPT | Performed by: FAMILY MEDICINE

## 2023-01-04 PROCEDURE — G8484 FLU IMMUNIZE NO ADMIN: HCPCS | Performed by: FAMILY MEDICINE

## 2023-01-04 PROCEDURE — 11200 RMVL SKIN TAGS UP TO&INC 15: CPT | Performed by: FAMILY MEDICINE

## 2023-01-04 PROCEDURE — 1036F TOBACCO NON-USER: CPT | Performed by: FAMILY MEDICINE

## 2023-01-04 PROCEDURE — G8427 DOCREV CUR MEDS BY ELIG CLIN: HCPCS | Performed by: FAMILY MEDICINE

## 2023-01-04 PROCEDURE — 90694 VACC AIIV4 NO PRSRV 0.5ML IM: CPT | Performed by: FAMILY MEDICINE

## 2023-01-04 PROCEDURE — G0008 ADMIN INFLUENZA VIRUS VAC: HCPCS | Performed by: FAMILY MEDICINE

## 2023-01-04 PROCEDURE — G8420 CALC BMI NORM PARAMETERS: HCPCS | Performed by: FAMILY MEDICINE

## 2023-01-04 PROCEDURE — 17110 DESTRUCTION B9 LES UP TO 14: CPT | Performed by: FAMILY MEDICINE

## 2023-01-04 ASSESSMENT — PATIENT HEALTH QUESTIONNAIRE - PHQ9
2. FEELING DOWN, DEPRESSED OR HOPELESS: 0
SUM OF ALL RESPONSES TO PHQ QUESTIONS 1-9: 0
SUM OF ALL RESPONSES TO PHQ9 QUESTIONS 1 & 2: 0
1. LITTLE INTEREST OR PLEASURE IN DOING THINGS: 0
SUM OF ALL RESPONSES TO PHQ QUESTIONS 1-9: 0

## 2023-01-04 NOTE — PROGRESS NOTES
Subjective:       Philip Kessler is a 78 y.o. male who complains of skin tags. The patient wishes skin tag removed as the lesion is getting caught on clothing and/or jewelry and is recurrently irritated. Patient's medications, allergies, past medical, surgical, social and family histories were reviewed and updated as appropriate. Review of Systems  Pertinent items are noted in HPI. Objective:      Skin:  8 skin tags in the thorax region, were removed using scissors and forceps after alcohol prep; hemostasis is obtained with silver nitrate sticks and discarded. Assessment:      Chronically irritated skin tag, removed. Plan:       1. The patient is instructed to watch for signs of infection including erythema, pain,       purulent discharge, or crusting. 2. Verbal patient instruction given. 3. Follow up as needed for acute illness. Also had 3 irritated seborrheic keratoses today that were frozen with liquid nitrogen.   1 was on the right neck 1 was on the right shoulder and 1 was on the left flank

## 2023-01-23 ENCOUNTER — OFFICE VISIT (OUTPATIENT)
Dept: FAMILY MEDICINE CLINIC | Age: 83
End: 2023-01-23
Payer: MEDICARE

## 2023-01-23 VITALS
DIASTOLIC BLOOD PRESSURE: 70 MMHG | OXYGEN SATURATION: 94 % | SYSTOLIC BLOOD PRESSURE: 136 MMHG | HEART RATE: 66 BPM | BODY MASS INDEX: 25.34 KG/M2 | WEIGHT: 177 LBS | HEIGHT: 70 IN

## 2023-01-23 DIAGNOSIS — R20.9 DISTURBANCE OF SKIN SENSATION: ICD-10-CM

## 2023-01-23 DIAGNOSIS — L98.9 SKIN ABNORMALITY: Primary | ICD-10-CM

## 2023-01-23 DIAGNOSIS — D48.5 NEOPLASM OF UNCERTAIN BEHAVIOR OF SKIN: ICD-10-CM

## 2023-01-23 PROCEDURE — 11622 EXC S/N/H/F/G MAL+MRG 1.1-2: CPT | Performed by: FAMILY MEDICINE

## 2023-01-23 NOTE — PROGRESS NOTES
PROCEDURE NOTE    PRE-OP DIAGNOSIS:  rule-out Basal Cell Carcinoma    PROCEDURE:  Skin Lesion Excision(s). Lesion is on right neck and measures 1.2 cm x 1 cm. INDICATIONS:  Wellington Coon is a 78 y.o. male who presents for minor skin surgery. The patient understands all risks, benefits, indications, potential complications, and alternatives, and freely consents for the procedure. The patient also understands the option of performing no surgery, the risk for scarring, and the technique of the procedure. ANESTHESIA:  Local.    TECHNIQUE:  After informed consent was obtained, and after the skin was prepped and draped, 1% lidocaine with epinephrine for anesthetic was injected around and underneath the site.   elliptical excision in total was performed. 4-0 Nylon suture used to close. A dressing was applied and wound care instructions were provided. Eliezer tolerated the procedure well and without complications. The patient will be alert for any signs of cutaneous infection and will follow up as instructed.

## 2023-01-30 ENCOUNTER — OFFICE VISIT (OUTPATIENT)
Dept: FAMILY MEDICINE CLINIC | Age: 83
End: 2023-01-30

## 2023-01-30 VITALS
HEIGHT: 70 IN | OXYGEN SATURATION: 96 % | BODY MASS INDEX: 25.2 KG/M2 | HEART RATE: 70 BPM | SYSTOLIC BLOOD PRESSURE: 130 MMHG | WEIGHT: 176 LBS | DIASTOLIC BLOOD PRESSURE: 70 MMHG

## 2023-01-30 DIAGNOSIS — Z48.02 VISIT FOR SUTURE REMOVAL: Primary | ICD-10-CM

## 2023-01-30 PROCEDURE — 99999 PR OFFICE/OUTPT VISIT,PROCEDURE ONLY: CPT | Performed by: FAMILY MEDICINE

## 2023-02-28 ENCOUNTER — OFFICE VISIT (OUTPATIENT)
Dept: FAMILY MEDICINE CLINIC | Age: 83
End: 2023-02-28

## 2023-02-28 VITALS
OXYGEN SATURATION: 96 % | HEIGHT: 70 IN | DIASTOLIC BLOOD PRESSURE: 74 MMHG | WEIGHT: 178 LBS | SYSTOLIC BLOOD PRESSURE: 142 MMHG | HEART RATE: 74 BPM | BODY MASS INDEX: 25.48 KG/M2

## 2023-02-28 DIAGNOSIS — C44.92 SCCA (SQUAMOUS CELL CARCINOMA) OF SKIN: Primary | ICD-10-CM

## 2023-02-28 RX ORDER — FLUOROURACIL 50 MG/G
CREAM TOPICAL
Qty: 40 G | Refills: 0 | Status: SHIPPED | OUTPATIENT
Start: 2023-02-28

## 2023-03-12 NOTE — PROGRESS NOTES
Filomena Emerson (:  1940) is a 78 y.o. male,Established patient, here for evaluation of the following chief complaint(s):  1 Month Follow-Up         ASSESSMENT/PLAN:  1. SCCA (squamous cell carcinoma) of skin  -     fluorouracil (EFUDEX) 5 % cream; Apply topically 2 times daily. , Disp-40 g, R-0, Normal  Patient understands that the use of Efudex in this instance is not following normal standard of care. This is to be considered somewhat palliative treatment at this time in order to shrink sizes of tumors and to mitigate some possible spreading. Patient understands that this is only usually used in squamous cell carcinoma in situ and that his is more infiltrative than this. We will see him back and see if we can convince him to do further surgical procedures but at this time he would like to avoid other surgical options. No follow-ups on file. Subjective   SUBJECTIVE/OBJECTIVE:  Filomena Emerson is a 78 y.o. male. Patient presents with:  1 Month Follow-Up    This is an 78-year-old male who has a history of squamous cell carcinoma of his right neck area. There is also a growth on his right ear. Patient wants to avoid significant surgical interventions. Patient did have this partially excised in his neck area and is doing well. Patient would like to consider having some sort of topical treatment to avoid surgical treatment at this time. Patient is not concerned about cosmetics but does not want to go under significant surgical treatment. The patients PMH, surgical history, family history, medications, allergies were all reviewed and updated as appropriate today. Review of Systems       Objective   Physical Exam  Vitals and nursing note reviewed. Constitutional:       Appearance: Normal appearance. He is well-developed. HENT:      Head: Normocephalic and atraumatic.       Right Ear: External ear normal.      Left Ear: External ear normal.      Nose: Nose normal.   Eyes: Conjunctiva/sclera: Conjunctivae normal.      Pupils: Pupils are equal, round, and reactive to light. Cardiovascular:      Rate and Rhythm: Normal rate and regular rhythm. Heart sounds: Normal heart sounds. No murmur heard. No friction rub. No gallop. Pulmonary:      Effort: Pulmonary effort is normal. No respiratory distress. Breath sounds: Normal breath sounds. No wheezing. Abdominal:      General: Bowel sounds are normal. There is no distension. Palpations: Abdomen is soft. Tenderness: There is no abdominal tenderness. Musculoskeletal:         General: No tenderness. Normal range of motion. Cervical back: Normal range of motion and neck supple. Skin:     General: Skin is warm and dry. Comments: Right neck and right ear have some palpable nodularities that do appear to be possible carcinoma. Neurological:      Mental Status: He is alert and oriented to person, place, and time. Deep Tendon Reflexes: Reflexes are normal and symmetric. Psychiatric:         Behavior: Behavior normal.         Thought Content: Thought content normal.         Judgment: Judgment normal.                An electronic signature was used to authenticate this note.     --Tip Valdes MD

## 2023-04-24 ENCOUNTER — OFFICE VISIT (OUTPATIENT)
Dept: FAMILY MEDICINE CLINIC | Age: 83
End: 2023-04-24
Payer: MEDICARE

## 2023-04-24 VITALS
BODY MASS INDEX: 25.05 KG/M2 | DIASTOLIC BLOOD PRESSURE: 70 MMHG | HEIGHT: 70 IN | WEIGHT: 175 LBS | SYSTOLIC BLOOD PRESSURE: 124 MMHG | OXYGEN SATURATION: 98 % | HEART RATE: 74 BPM

## 2023-04-24 DIAGNOSIS — C44.92 SCCA (SQUAMOUS CELL CARCINOMA) OF SKIN: Primary | ICD-10-CM

## 2023-04-24 PROCEDURE — G8427 DOCREV CUR MEDS BY ELIG CLIN: HCPCS | Performed by: FAMILY MEDICINE

## 2023-04-24 PROCEDURE — 1123F ACP DISCUSS/DSCN MKR DOCD: CPT | Performed by: FAMILY MEDICINE

## 2023-04-24 PROCEDURE — G8417 CALC BMI ABV UP PARAM F/U: HCPCS | Performed by: FAMILY MEDICINE

## 2023-04-24 PROCEDURE — 1036F TOBACCO NON-USER: CPT | Performed by: FAMILY MEDICINE

## 2023-04-24 PROCEDURE — 99213 OFFICE O/P EST LOW 20 MIN: CPT | Performed by: FAMILY MEDICINE

## 2023-05-06 NOTE — PROGRESS NOTES
couple of areas of mild ulceration with crust.   Neurological:      Mental Status: He is alert and oriented to person, place, and time. Deep Tendon Reflexes: Reflexes are normal and symmetric. Psychiatric:         Behavior: Behavior normal.         Thought Content: Thought content normal.         Judgment: Judgment normal.              An electronic signature was used to authenticate this note.     --Curtis Bhatt MD

## 2023-07-11 ENCOUNTER — OFFICE VISIT (OUTPATIENT)
Dept: FAMILY MEDICINE CLINIC | Age: 83
End: 2023-07-11
Payer: MEDICARE

## 2023-07-11 VITALS
DIASTOLIC BLOOD PRESSURE: 64 MMHG | BODY MASS INDEX: 24.34 KG/M2 | HEIGHT: 70 IN | WEIGHT: 170 LBS | HEART RATE: 78 BPM | OXYGEN SATURATION: 98 % | SYSTOLIC BLOOD PRESSURE: 136 MMHG

## 2023-07-11 DIAGNOSIS — C44.92 SCCA (SQUAMOUS CELL CARCINOMA) OF SKIN: Primary | ICD-10-CM

## 2023-07-11 PROCEDURE — G8427 DOCREV CUR MEDS BY ELIG CLIN: HCPCS | Performed by: FAMILY MEDICINE

## 2023-07-11 PROCEDURE — G8420 CALC BMI NORM PARAMETERS: HCPCS | Performed by: FAMILY MEDICINE

## 2023-07-11 PROCEDURE — 99212 OFFICE O/P EST SF 10 MIN: CPT | Performed by: FAMILY MEDICINE

## 2023-07-11 PROCEDURE — 1123F ACP DISCUSS/DSCN MKR DOCD: CPT | Performed by: FAMILY MEDICINE

## 2023-07-11 PROCEDURE — 1036F TOBACCO NON-USER: CPT | Performed by: FAMILY MEDICINE

## 2023-07-17 NOTE — PROGRESS NOTES
Lisa Hein (:  1940) is a 78 y.o. male,Established patient, here for evaluation of the following chief complaint(s):  1 Month Follow-Up         ASSESSMENT/PLAN:  1. SCCA (squamous cell carcinoma) of skin  No evidence of recurrence. No follow-ups on file. Subjective   SUBJECTIVE/OBJECTIVE:  Lisa Hein is a 78 y.o. male. Patient presents with:  1 Month Follow-Up      Recheck SCC on right neck. Healed well, no pain, no lumps noted. He otherwise feels well. The patients PMH, surgical history, family history, medications, allergies were all reviewed and updated as appropriate today. Review of Systems       Objective   Physical Exam  Nursing note reviewed. Constitutional:       Appearance: He is well-developed. HENT:      Head: Normocephalic and atraumatic. Right Ear: External ear normal.      Left Ear: External ear normal.      Nose: Nose normal.   Eyes:      Conjunctiva/sclera: Conjunctivae normal.      Pupils: Pupils are equal, round, and reactive to light. Cardiovascular:      Rate and Rhythm: Normal rate and regular rhythm. Heart sounds: Normal heart sounds. No murmur heard. No friction rub. No gallop. Pulmonary:      Effort: Pulmonary effort is normal. No respiratory distress. Breath sounds: Normal breath sounds. No wheezing. Abdominal:      General: Bowel sounds are normal. There is no distension. Palpations: Abdomen is soft. Tenderness: There is no abdominal tenderness. Musculoskeletal:         General: No tenderness. Normal range of motion. Cervical back: Normal range of motion and neck supple. Skin:     General: Skin is warm and dry. Comments: Right neck with scar, no nodules. Neurological:      Mental Status: He is alert and oriented to person, place, and time. Deep Tendon Reflexes: Reflexes are normal and symmetric. Psychiatric:         Behavior: Behavior normal.         Thought Content:  Thought content

## 2023-08-01 ENCOUNTER — TELEPHONE (OUTPATIENT)
Dept: FAMILY MEDICINE CLINIC | Age: 83
End: 2023-08-01

## 2023-08-01 DIAGNOSIS — L60.2 NAIL HYPERTROPHY: Primary | ICD-10-CM

## 2023-08-01 NOTE — TELEPHONE ENCOUNTER
Received a fax from [facility] requesting a referral be sent for a nail trim and possible fungal nail infection.  Please advise

## 2023-11-15 ENCOUNTER — OFFICE VISIT (OUTPATIENT)
Dept: FAMILY MEDICINE CLINIC | Age: 83
End: 2023-11-15

## 2023-11-15 VITALS
HEART RATE: 73 BPM | SYSTOLIC BLOOD PRESSURE: 110 MMHG | HEIGHT: 70 IN | OXYGEN SATURATION: 97 % | BODY MASS INDEX: 24.05 KG/M2 | DIASTOLIC BLOOD PRESSURE: 62 MMHG | WEIGHT: 168 LBS

## 2023-11-15 DIAGNOSIS — R82.998 DARK URINE: ICD-10-CM

## 2023-11-15 DIAGNOSIS — Z23 NEED FOR PROPHYLACTIC VACCINATION AND INOCULATION AGAINST INFLUENZA: Primary | ICD-10-CM

## 2023-11-15 LAB
BILIRUBIN, POC: ABNORMAL
BLOOD URINE, POC: ABNORMAL
CLARITY, POC: ABNORMAL
COLOR, POC: ABNORMAL
GLUCOSE URINE, POC: ABNORMAL
KETONES, POC: ABNORMAL
LEUKOCYTE EST, POC: ABNORMAL
NITRITE, POC: ABNORMAL
PH, POC: 6
PROTEIN, POC: 100
SPECIFIC GRAVITY, POC: >=1.03
UROBILINOGEN, POC: 0.2

## 2023-11-15 RX ORDER — TRIAMCINOLONE ACETONIDE 1 MG/G
CREAM TOPICAL
Qty: 30 G | Refills: 0 | Status: SHIPPED | OUTPATIENT
Start: 2023-11-15

## 2023-11-16 LAB — BACTERIA UR CULT: NORMAL

## 2023-11-20 ENCOUNTER — TELEPHONE (OUTPATIENT)
Dept: FAMILY MEDICINE CLINIC | Age: 83
End: 2023-11-20

## 2023-11-20 NOTE — TELEPHONE ENCOUNTER
Patient's wife is calling for his results from his Urinalysis on 11/15. They are leaving out of town and would like to be called today. They do not use My-Chart without their daughter.     Please call   Home [phone]  Cell [phone]

## 2023-11-20 NOTE — TELEPHONE ENCOUNTER
Randi (on HIPAA) informed  - she states that his urine will be clear then dark. She thinks that it has to do with his diet as well.

## 2023-11-27 ENCOUNTER — TELEPHONE (OUTPATIENT)
Dept: FAMILY MEDICINE CLINIC | Age: 83
End: 2023-11-27

## 2023-11-27 NOTE — TELEPHONE ENCOUNTER
Can this pt be double booked with Dr. Johnny Price? Or is it okay to schedule with another provider?

## 2023-11-28 ENCOUNTER — OFFICE VISIT (OUTPATIENT)
Dept: FAMILY MEDICINE CLINIC | Age: 83
End: 2023-11-28
Payer: MEDICARE

## 2023-11-28 VITALS
OXYGEN SATURATION: 98 % | HEART RATE: 78 BPM | SYSTOLIC BLOOD PRESSURE: 120 MMHG | BODY MASS INDEX: 23.62 KG/M2 | DIASTOLIC BLOOD PRESSURE: 68 MMHG | HEIGHT: 70 IN | WEIGHT: 165 LBS

## 2023-11-28 DIAGNOSIS — R82.998 DARK URINE: Primary | ICD-10-CM

## 2023-11-28 DIAGNOSIS — R31.0 GROSS HEMATURIA: ICD-10-CM

## 2023-11-28 DIAGNOSIS — R82.998 DARK URINE: ICD-10-CM

## 2023-11-28 LAB
ALBUMIN SERPL-MCNC: 4.3 G/DL (ref 3.4–5)
ALBUMIN/GLOB SERPL: 1.7 {RATIO} (ref 1.1–2.2)
ALP SERPL-CCNC: 105 U/L (ref 40–129)
ALT SERPL-CCNC: 10 U/L (ref 10–40)
ANION GAP SERPL CALCULATED.3IONS-SCNC: 12 MMOL/L (ref 3–16)
AST SERPL-CCNC: 12 U/L (ref 15–37)
BASOPHILS # BLD: 0 K/UL (ref 0–0.2)
BASOPHILS NFR BLD: 0.5 %
BILIRUB SERPL-MCNC: 0.6 MG/DL (ref 0–1)
BILIRUBIN, POC: NORMAL
BLOOD URINE, POC: NORMAL
BUN SERPL-MCNC: 17 MG/DL (ref 7–20)
CALCIUM SERPL-MCNC: 10 MG/DL (ref 8.3–10.6)
CHLORIDE SERPL-SCNC: 102 MMOL/L (ref 99–110)
CLARITY, POC: NORMAL
CO2 SERPL-SCNC: 23 MMOL/L (ref 21–32)
COLOR, POC: NORMAL
CREAT SERPL-MCNC: 1.3 MG/DL (ref 0.8–1.3)
DEPRECATED RDW RBC AUTO: 13.1 % (ref 12.4–15.4)
EOSINOPHIL # BLD: 0.1 K/UL (ref 0–0.6)
EOSINOPHIL NFR BLD: 1 %
GFR SERPLBLD CREATININE-BSD FMLA CKD-EPI: 54 ML/MIN/{1.73_M2}
GLUCOSE SERPL-MCNC: 106 MG/DL (ref 70–99)
GLUCOSE URINE, POC: NORMAL
HCT VFR BLD AUTO: 41.4 % (ref 40.5–52.5)
HGB BLD-MCNC: 14.2 G/DL (ref 13.5–17.5)
KETONES, POC: NORMAL
LEUKOCYTE EST, POC: NORMAL
LYMPHOCYTES # BLD: 1.3 K/UL (ref 1–5.1)
LYMPHOCYTES NFR BLD: 19.8 %
MCH RBC QN AUTO: 30.2 PG (ref 26–34)
MCHC RBC AUTO-ENTMCNC: 34.4 G/DL (ref 31–36)
MCV RBC AUTO: 88 FL (ref 80–100)
MONOCYTES # BLD: 0.8 K/UL (ref 0–1.3)
MONOCYTES NFR BLD: 12.2 %
NEUTROPHILS # BLD: 4.3 K/UL (ref 1.7–7.7)
NEUTROPHILS NFR BLD: 66.5 %
NITRITE, POC: NORMAL
PH, POC: 5.5
PLATELET # BLD AUTO: 277 K/UL (ref 135–450)
PMV BLD AUTO: 8 FL (ref 5–10.5)
POTASSIUM SERPL-SCNC: 4.6 MMOL/L (ref 3.5–5.1)
PROT SERPL-MCNC: 6.9 G/DL (ref 6.4–8.2)
PROTEIN, POC: >=300
RBC # BLD AUTO: 4.7 M/UL (ref 4.2–5.9)
SODIUM SERPL-SCNC: 137 MMOL/L (ref 136–145)
SPECIFIC GRAVITY, POC: 1.03
UROBILINOGEN, POC: 1
WBC # BLD AUTO: 6.5 K/UL (ref 4–11)

## 2023-11-28 PROCEDURE — 1123F ACP DISCUSS/DSCN MKR DOCD: CPT | Performed by: FAMILY MEDICINE

## 2023-11-28 PROCEDURE — 99214 OFFICE O/P EST MOD 30 MIN: CPT | Performed by: FAMILY MEDICINE

## 2023-11-28 PROCEDURE — G8427 DOCREV CUR MEDS BY ELIG CLIN: HCPCS | Performed by: FAMILY MEDICINE

## 2023-11-28 PROCEDURE — G8420 CALC BMI NORM PARAMETERS: HCPCS | Performed by: FAMILY MEDICINE

## 2023-11-28 PROCEDURE — G8484 FLU IMMUNIZE NO ADMIN: HCPCS | Performed by: FAMILY MEDICINE

## 2023-11-28 PROCEDURE — 1036F TOBACCO NON-USER: CPT | Performed by: FAMILY MEDICINE

## 2023-11-28 PROCEDURE — 81002 URINALYSIS NONAUTO W/O SCOPE: CPT | Performed by: FAMILY MEDICINE

## 2023-11-28 NOTE — PROGRESS NOTES
Jessica Frey (:  1940) is a 78 y.o. male,Established patient, here for evaluation of the following chief complaint(s):  Follow-up (Blood in urine )         ASSESSMENT/PLAN:  1. Dark urine  -     POCT Urinalysis no Micro  -     CT ABDOMEN PELVIS WO CONTRAST Additional Contrast? Radiologist Recommendation; Future  -     SAMARA Alvarez MD, Urology, [facility]  -     Comprehensive Metabolic Panel; Future  -     CBC with Auto Differential; Future  2. Gross hematuria  -     CT ABDOMEN PELVIS WO CONTRAST Additional Contrast? Radiologist Recommendation; Future  -     SAMARA Alvarez MD, Urology, [facility]  -     Comprehensive Metabolic Panel; Future  -     CBC with Auto Differential; Future      No follow-ups on file. Subjective   SUBJECTIVE/OBJECTIVE:  Kinza Mclean is a 78 y.o. male. Patient presents with: Follow-up: Blood in urine       66-year-old male presents for gross hematuria. Patient has been having this on and off for several weeks. Patient has had some decreased urine stream.  Patient notes no dysuria. Patient has had no significant fever. He has had some abdominal tenderness at times. Patient notes no flank tenderness. The patients PMH, surgical history, family history, medications, allergies were all reviewed and updated as appropriate today. Review of Systems       Objective   Physical Exam  Vitals and nursing note reviewed. Constitutional:       Appearance: Normal appearance. He is well-developed. HENT:      Head: Normocephalic and atraumatic. Right Ear: External ear normal.      Left Ear: External ear normal.      Nose: Nose normal.   Eyes:      Conjunctiva/sclera: Conjunctivae normal.      Pupils: Pupils are equal, round, and reactive to light. Cardiovascular:      Rate and Rhythm: Normal rate and regular rhythm. Heart sounds: Normal heart sounds. No murmur heard. No friction rub. No gallop.    Pulmonary:      Effort: Pulmonary

## 2023-11-30 ENCOUNTER — TELEPHONE (OUTPATIENT)
Dept: FAMILY MEDICINE CLINIC | Age: 83
End: 2023-11-30

## 2023-11-30 RX ORDER — SULFAMETHOXAZOLE AND TRIMETHOPRIM 800; 160 MG/1; MG/1
1 TABLET ORAL 2 TIMES DAILY
Qty: 14 TABLET | Refills: 0 | Status: SHIPPED | OUTPATIENT
Start: 2023-11-30 | End: 2023-12-07

## 2023-11-30 NOTE — TELEPHONE ENCOUNTER
Spoke to [employer] (on HIPAA). She states that he has been having sx for awhile (x 2 weeks)  and his stream is affected. He does have an appt w Dr. Simeon Gilbert on 12/15. He is having testing done at the hospital on Monday.

## 2023-11-30 NOTE — TELEPHONE ENCOUNTER
Patient called stating he is constantly running to the bathroom, frequency and pain and burning with urination. saw PCP on 11/28/23. He is requesting an antibiotic. Pharmacy [facility].       CT of abdomen/pelvis is scheduled for Monday 12/4/23, also called urologist and has appointment 12/12/223

## 2023-12-04 ENCOUNTER — HOSPITAL ENCOUNTER (OUTPATIENT)
Dept: CT IMAGING | Age: 83
Discharge: HOME | End: 2023-12-04
Attending: FAMILY MEDICINE
Payer: MEDICARE

## 2023-12-04 DIAGNOSIS — R82.998 DARK URINE: ICD-10-CM

## 2023-12-04 DIAGNOSIS — R31.0 GROSS HEMATURIA: ICD-10-CM

## 2023-12-04 PROCEDURE — 74176 CT ABD & PELVIS W/O CONTRAST: CPT

## 2023-12-07 ENCOUNTER — TELEPHONE (OUTPATIENT)
Dept: FAMILY MEDICINE CLINIC | Age: 83
End: 2023-12-07

## 2023-12-07 ENCOUNTER — OFFICE VISIT (OUTPATIENT)
Dept: FAMILY MEDICINE CLINIC | Age: 83
End: 2023-12-07
Payer: MEDICARE

## 2023-12-07 VITALS
BODY MASS INDEX: 23.48 KG/M2 | HEIGHT: 70 IN | WEIGHT: 164 LBS | SYSTOLIC BLOOD PRESSURE: 122 MMHG | OXYGEN SATURATION: 97 % | HEART RATE: 82 BPM | DIASTOLIC BLOOD PRESSURE: 64 MMHG

## 2023-12-07 DIAGNOSIS — R91.8 PULMONARY NODULES/LESIONS, MULTIPLE: Primary | ICD-10-CM

## 2023-12-07 PROCEDURE — G8427 DOCREV CUR MEDS BY ELIG CLIN: HCPCS | Performed by: FAMILY MEDICINE

## 2023-12-07 PROCEDURE — 1036F TOBACCO NON-USER: CPT | Performed by: FAMILY MEDICINE

## 2023-12-07 PROCEDURE — 1123F ACP DISCUSS/DSCN MKR DOCD: CPT | Performed by: FAMILY MEDICINE

## 2023-12-07 PROCEDURE — G8420 CALC BMI NORM PARAMETERS: HCPCS | Performed by: FAMILY MEDICINE

## 2023-12-07 PROCEDURE — G8484 FLU IMMUNIZE NO ADMIN: HCPCS | Performed by: FAMILY MEDICINE

## 2023-12-07 PROCEDURE — 99214 OFFICE O/P EST MOD 30 MIN: CPT | Performed by: FAMILY MEDICINE

## 2023-12-07 NOTE — TELEPHONE ENCOUNTER
CT dept calling to make sure the office received the final result of pt' CT scan done 12/4/23. Advised final result was in pt's chart, but was not sure if provider had reviewed yet.

## 2023-12-07 NOTE — TELEPHONE ENCOUNTER
Wife called upset, stating they viewed CT results in My chart . She is requesting a call back to review the CT results from 12/4/23 as she does not quite understand .  Advise

## 2023-12-07 NOTE — TELEPHONE ENCOUNTER
Pt's daughter called asking to speak with [facility] in regards to Pt's health, can be reached at Cell#: [phone] or Home#:[phone]

## 2023-12-13 ENCOUNTER — TELEPHONE (OUTPATIENT)
Dept: CASE MANAGEMENT | Age: 83
End: 2023-12-13

## 2023-12-13 DIAGNOSIS — R91.1 PULMONARY NODULE: Primary | ICD-10-CM

## 2023-12-13 NOTE — TELEPHONE ENCOUNTER
CT Abd 12/4/23    IMPRESSION:  Innumerable pulmonary nodules throughout the bilateral portions of the  visualized lungs measuring up to 1.3 cm in size is highly concerning for  metastatic disease. Recommend oncology consult and complete workup including tissue diagnosis and PET/CT. Pulmonology Referral pended to chart should you choose to sign. Please choose either [facility] or [facility] office and delete the other. Aleksander Hoyt  Lung Navigation AARTI Sampson [email or web address]

## 2023-12-15 ENCOUNTER — OFFICE VISIT (OUTPATIENT)
Dept: PULMONOLOGY | Age: 83
End: 2023-12-15
Payer: MEDICARE

## 2023-12-15 VITALS
BODY MASS INDEX: 22.33 KG/M2 | HEIGHT: 70 IN | DIASTOLIC BLOOD PRESSURE: 77 MMHG | WEIGHT: 156 LBS | HEART RATE: 91 BPM | SYSTOLIC BLOOD PRESSURE: 135 MMHG | OXYGEN SATURATION: 93 % | RESPIRATION RATE: 20 BRPM

## 2023-12-15 DIAGNOSIS — R91.8 MULTIPLE LUNG NODULES ON CT: Primary | ICD-10-CM

## 2023-12-15 DIAGNOSIS — R16.0 LIVER MASS, RIGHT LOBE: ICD-10-CM

## 2023-12-15 DIAGNOSIS — N32.89 BLADDER MASS: ICD-10-CM

## 2023-12-15 PROCEDURE — 99204 OFFICE O/P NEW MOD 45 MIN: CPT | Performed by: INTERNAL MEDICINE

## 2023-12-15 PROCEDURE — 1036F TOBACCO NON-USER: CPT | Performed by: INTERNAL MEDICINE

## 2023-12-15 PROCEDURE — G8484 FLU IMMUNIZE NO ADMIN: HCPCS | Performed by: INTERNAL MEDICINE

## 2023-12-15 PROCEDURE — G8420 CALC BMI NORM PARAMETERS: HCPCS | Performed by: INTERNAL MEDICINE

## 2023-12-15 PROCEDURE — 1123F ACP DISCUSS/DSCN MKR DOCD: CPT | Performed by: INTERNAL MEDICINE

## 2023-12-15 PROCEDURE — G8427 DOCREV CUR MEDS BY ELIG CLIN: HCPCS | Performed by: INTERNAL MEDICINE

## 2023-12-15 NOTE — PROGRESS NOTES
MA Communication: The following orders are received by verbal communication from Bonifacio Calvillo MD    Orders include:  PET Scan 12/29-need moved up. Liver Biopsy.     Daughter-Rizwana Khalil Hoops: [phone]

## 2023-12-15 NOTE — PATIENT INSTRUCTIONS
liver biopsy    Call me next week after PET scan or liver biopsy    Remember to bring a list of pulmonary medications and any CPAP or BiPAP machines to your next appointment with the office. Please keep all of your future appointments scheduled by [facility] office. Out of respect for other patients and providers, you may be asked to reschedule your appointment if you arrive later than your scheduled appointment time. Appointments cancelled less than 24hrs in advance will be considered a no show. Patients with three missed appointments within 1 year or four missed appointments within 2 years can be dismissed from the practice. Please be aware that our physicians are required to work in the Intensive Care Unit at [facility].  Your appointment may need to be rescheduled if they are designated to work during your appointment time. You may receive a survey regarding the care you received during your visit. Your input is valuable to us. We encourage you to complete and return your survey. We hope you will choose us in the future for your healthcare needs. Pt instructed of all future appointment dates & times, including radiology, labs, procedures & referrals. If procedures were scheduled preparation instructions provided. Instructions on future appointments with [facility] were given.

## 2023-12-15 NOTE — PROGRESS NOTES
Chele Martins (: 1940 ) is a 78 y.o. male here for an evaluation of   Chief Complaint   Patient presents with    Establish Care    Pulmonary Nodule         ASSESSMENT/PLAN:   Diagnosis Orders   1. Multiple lung nodules on CT  PET CT SKULL BASE TO MID THIGH      2. Bladder mass  PET CT SKULL BASE TO MID THIGH      3. Liver mass, right lobe  CT GUIDED FNA    PET CT SKULL BASE TO MID THIGH        Lung nodules/Bladder Mass and liver mass    CT scan of the abdomen and pelvis were done 2023  IMPRESSION:  Innumerable pulmonary nodules throughout the bilateral portions of the  visualized lungs measuring up to 1.3 cm in size is highly concerning for  metastatic disease. Recommend oncology consult and complete workup including  tissue diagnosis and PET/CT. Asymmetric and irregular wall thickening of the anterior bladder is  incompletely evaluated on this noncontrast exam.  This could represent a  bladder mass. Focal cystitis is felt to be less likely. Recommend urology  consult. 1.2 cm jackstone calculus of the bladder. RECOMMENDATIONS:  Recommend oncology consult as well as PET/CT and biopsy for tissue diagnosis. Recommend urology consult for possible bladder mass. The findings were sent to the Radiology Results [employer] [facility] [employer] at 7:46  pm on 2023 to be communicated to a licensed caregiver. Organs: The liver is normal in size with smooth contours. There are several  simple fluid attenuating lesions throughout the liver with the largest  measuring 3.7 cm. There is no biliary ductal dilatation the gallbladder is  normally distended without radiopaque calculi. The spleen is normal. The  adrenal glands are normal. The pancreas is normal in size without dilatation  of the main pancreatic duct. The kidneys are normal in size without  hydronephrosis. There is mild bilateral perinephric fat stranding. A faint 2  mm density in the right lower pole could represent a calculus.

## 2023-12-20 ENCOUNTER — TELEPHONE (OUTPATIENT)
Dept: FAMILY MEDICINE CLINIC | Age: 83
End: 2023-12-20

## 2023-12-20 NOTE — TELEPHONE ENCOUNTER
[facility] lab called and stated the pt was seen at [facility] and they ordered a EKG but since the center is not in Epic they informed them to contact his pcp for the order. She was not sure on reason for test? Spoke with  and he is not able to ok that without having any information or reason for the testing. [facility] was informed and instructed pt to contact the office.

## 2023-12-22 ENCOUNTER — APPOINTMENT (OUTPATIENT)
Dept: CT IMAGING | Age: 83
DRG: 686 | End: 2023-12-22
Payer: MEDICARE

## 2023-12-22 ENCOUNTER — HOSPITAL ENCOUNTER (INPATIENT)
Age: 83
LOS: 10 days | DRG: 686 | End: 2024-01-01
Attending: STUDENT IN AN ORGANIZED HEALTH CARE EDUCATION/TRAINING PROGRAM | Admitting: INTERNAL MEDICINE
Payer: MEDICARE

## 2023-12-22 ENCOUNTER — APPOINTMENT (OUTPATIENT)
Dept: GENERAL RADIOLOGY | Age: 83
DRG: 686 | End: 2023-12-22
Payer: MEDICARE

## 2023-12-22 DIAGNOSIS — N30.01 ACUTE CYSTITIS WITH HEMATURIA: Primary | ICD-10-CM

## 2023-12-22 DIAGNOSIS — E83.52 HYPERCALCEMIA: ICD-10-CM

## 2023-12-22 PROBLEM — G93.41 ACUTE METABOLIC ENCEPHALOPATHY: Status: ACTIVE | Noted: 2023-12-22

## 2023-12-22 PROBLEM — E87.6 HYPOKALEMIA: Status: ACTIVE | Noted: 2023-12-22

## 2023-12-22 PROBLEM — N20.0 RENAL CALCULI: Status: ACTIVE | Noted: 2023-12-22

## 2023-12-22 PROBLEM — N39.0 ACUTE UTI: Status: ACTIVE | Noted: 2023-12-22

## 2023-12-22 LAB
ALBUMIN SERPL-MCNC: 3.4 G/DL (ref 3.4–5)
ALBUMIN/GLOB SERPL: 1 {RATIO} (ref 1.1–2.2)
ALP SERPL-CCNC: 92 U/L (ref 40–129)
ALT SERPL-CCNC: 29 U/L (ref 10–40)
ANION GAP SERPL CALCULATED.3IONS-SCNC: 9 MMOL/L (ref 3–16)
AST SERPL-CCNC: 20 U/L (ref 15–37)
BACTERIA URNS QL MICRO: ABNORMAL /HPF
BASOPHILS # BLD: 0.1 K/UL (ref 0–0.2)
BASOPHILS NFR BLD: 0.4 %
BILIRUB SERPL-MCNC: 0.4 MG/DL (ref 0–1)
BILIRUB UR QL STRIP.AUTO: NEGATIVE
BUN SERPL-MCNC: 22 MG/DL (ref 7–20)
CALCIUM SERPL-MCNC: 15.5 MG/DL (ref 8.3–10.6)
CHLORIDE SERPL-SCNC: 99 MMOL/L (ref 99–110)
CLARITY UR: CLEAR
CO2 SERPL-SCNC: 29 MMOL/L (ref 21–32)
COLOR UR: YELLOW
CREAT SERPL-MCNC: 1.3 MG/DL (ref 0.8–1.3)
D DIMER: 1.56 UG/ML FEU (ref 0–0.6)
DEPRECATED RDW RBC AUTO: 13.1 % (ref 12.4–15.4)
EKG ATRIAL RATE: 86 BPM
EKG DIAGNOSIS: NORMAL
EKG P AXIS: 34 DEGREES
EKG P-R INTERVAL: 262 MS
EKG Q-T INTERVAL: 402 MS
EKG QRS DURATION: 130 MS
EKG QTC CALCULATION (BAZETT): 481 MS
EKG R AXIS: -60 DEGREES
EKG T AXIS: 71 DEGREES
EKG VENTRICULAR RATE: 86 BPM
EOSINOPHIL # BLD: 0 K/UL (ref 0–0.6)
EOSINOPHIL NFR BLD: 0.2 %
FLUAV RNA RESP QL NAA+PROBE: NOT DETECTED
FLUBV RNA RESP QL NAA+PROBE: NOT DETECTED
GFR SERPLBLD CREATININE-BSD FMLA CKD-EPI: 54 ML/MIN/{1.73_M2}
GLUCOSE SERPL-MCNC: 152 MG/DL (ref 70–99)
GLUCOSE UR STRIP.AUTO-MCNC: NEGATIVE MG/DL
HCT VFR BLD AUTO: 39.5 % (ref 40.5–52.5)
HGB BLD-MCNC: 13 G/DL (ref 13.5–17.5)
HGB UR QL STRIP.AUTO: ABNORMAL
KETONES UR STRIP.AUTO-MCNC: NEGATIVE MG/DL
LACTATE BLDV-SCNC: 3.3 MMOL/L (ref 0.4–2)
LEUKOCYTE ESTERASE UR QL STRIP.AUTO: ABNORMAL
LYMPHOCYTES # BLD: 1.1 K/UL (ref 1–5.1)
LYMPHOCYTES NFR BLD: 8.4 %
MAGNESIUM SERPL-MCNC: 2 MG/DL (ref 1.8–2.4)
MCH RBC QN AUTO: 28.6 PG (ref 26–34)
MCHC RBC AUTO-ENTMCNC: 32.8 G/DL (ref 31–36)
MCV RBC AUTO: 87 FL (ref 80–100)
MONOCYTES # BLD: 0.9 K/UL (ref 0–1.3)
MONOCYTES NFR BLD: 6.8 %
NEUTROPHILS # BLD: 10.6 K/UL (ref 1.7–7.7)
NEUTROPHILS NFR BLD: 84.2 %
NITRITE UR QL STRIP.AUTO: NEGATIVE
NT-PROBNP SERPL-MCNC: 545 PG/ML (ref 0–449)
PH UR STRIP.AUTO: 6 [PH] (ref 5–8)
PLATELET # BLD AUTO: 393 K/UL (ref 135–450)
PMV BLD AUTO: 7 FL (ref 5–10.5)
POTASSIUM SERPL-SCNC: 3.4 MMOL/L (ref 3.5–5.1)
PROT SERPL-MCNC: 6.8 G/DL (ref 6.4–8.2)
PROT UR STRIP.AUTO-MCNC: 30 MG/DL
RBC # BLD AUTO: 4.54 M/UL (ref 4.2–5.9)
RBC #/AREA URNS HPF: >100 /HPF (ref 0–4)
SARS-COV-2 RNA RESP QL NAA+PROBE: NOT DETECTED
SODIUM SERPL-SCNC: 137 MMOL/L (ref 136–145)
SP GR UR STRIP.AUTO: 1.02 (ref 1–1.03)
TROPONIN, HIGH SENSITIVITY: 24 NG/L (ref 0–22)
UA COMPLETE W REFLEX CULTURE PNL UR: ABNORMAL
UA DIPSTICK W REFLEX MICRO PNL UR: YES
URN SPEC COLLECT METH UR: ABNORMAL
UROBILINOGEN UR STRIP-ACNC: 0.2 E.U./DL
WBC # BLD AUTO: 12.6 K/UL (ref 4–11)
WBC #/AREA URNS HPF: ABNORMAL /HPF (ref 0–5)

## 2023-12-22 PROCEDURE — 83735 ASSAY OF MAGNESIUM: CPT

## 2023-12-22 PROCEDURE — 87636 SARSCOV2 & INF A&B AMP PRB: CPT

## 2023-12-22 PROCEDURE — 81001 URINALYSIS AUTO W/SCOPE: CPT

## 2023-12-22 PROCEDURE — 99285 EMERGENCY DEPT VISIT HI MDM: CPT

## 2023-12-22 PROCEDURE — 83880 ASSAY OF NATRIURETIC PEPTIDE: CPT

## 2023-12-22 PROCEDURE — 93010 ELECTROCARDIOGRAM REPORT: CPT | Performed by: INTERNAL MEDICINE

## 2023-12-22 PROCEDURE — 80053 COMPREHEN METABOLIC PANEL: CPT

## 2023-12-22 PROCEDURE — 2580000003 HC RX 258: Performed by: INTERNAL MEDICINE

## 2023-12-22 PROCEDURE — 1200000000 HC SEMI PRIVATE

## 2023-12-22 PROCEDURE — 2700000000 HC OXYGEN THERAPY PER DAY

## 2023-12-22 PROCEDURE — 6360000002 HC RX W HCPCS: Performed by: INTERNAL MEDICINE

## 2023-12-22 PROCEDURE — 96375 TX/PRO/DX INJ NEW DRUG ADDON: CPT

## 2023-12-22 PROCEDURE — 6360000002 HC RX W HCPCS: Performed by: PHYSICIAN ASSISTANT

## 2023-12-22 PROCEDURE — 84484 ASSAY OF TROPONIN QUANT: CPT

## 2023-12-22 PROCEDURE — 83605 ASSAY OF LACTIC ACID: CPT

## 2023-12-22 PROCEDURE — 94761 N-INVAS EAR/PLS OXIMETRY MLT: CPT

## 2023-12-22 PROCEDURE — 70450 CT HEAD/BRAIN W/O DYE: CPT

## 2023-12-22 PROCEDURE — 93005 ELECTROCARDIOGRAM TRACING: CPT | Performed by: PHYSICIAN ASSISTANT

## 2023-12-22 PROCEDURE — 96365 THER/PROPH/DIAG IV INF INIT: CPT

## 2023-12-22 PROCEDURE — 71045 X-RAY EXAM CHEST 1 VIEW: CPT

## 2023-12-22 PROCEDURE — 2580000003 HC RX 258: Performed by: PHYSICIAN ASSISTANT

## 2023-12-22 PROCEDURE — 6360000004 HC RX CONTRAST MEDICATION: Performed by: PHYSICIAN ASSISTANT

## 2023-12-22 PROCEDURE — 71260 CT THORAX DX C+: CPT

## 2023-12-22 PROCEDURE — 96372 THER/PROPH/DIAG INJ SC/IM: CPT

## 2023-12-22 PROCEDURE — 85379 FIBRIN DEGRADATION QUANT: CPT

## 2023-12-22 PROCEDURE — 85025 COMPLETE CBC W/AUTO DIFF WBC: CPT

## 2023-12-22 RX ORDER — ONDANSETRON 2 MG/ML
4 INJECTION INTRAMUSCULAR; INTRAVENOUS EVERY 6 HOURS PRN
Status: DISCONTINUED | OUTPATIENT
Start: 2023-12-22 | End: 2023-12-29

## 2023-12-22 RX ORDER — POTASSIUM CHLORIDE 7.45 MG/ML
10 INJECTION INTRAVENOUS PRN
Status: DISCONTINUED | OUTPATIENT
Start: 2023-12-22 | End: 2023-12-24

## 2023-12-22 RX ORDER — 0.9 % SODIUM CHLORIDE 0.9 %
1000 INTRAVENOUS SOLUTION INTRAVENOUS ONCE
Status: COMPLETED | OUTPATIENT
Start: 2023-12-22 | End: 2023-12-22

## 2023-12-22 RX ORDER — POTASSIUM CHLORIDE 20 MEQ/1
40 TABLET, EXTENDED RELEASE ORAL PRN
Status: DISCONTINUED | OUTPATIENT
Start: 2023-12-22 | End: 2023-12-24

## 2023-12-22 RX ORDER — POLYETHYLENE GLYCOL 3350 17 G/17G
17 POWDER, FOR SOLUTION ORAL DAILY PRN
Status: DISCONTINUED | OUTPATIENT
Start: 2023-12-22 | End: 2024-01-01 | Stop reason: HOSPADM

## 2023-12-22 RX ORDER — SODIUM CHLORIDE 9 MG/ML
INJECTION, SOLUTION INTRAVENOUS CONTINUOUS
Status: DISCONTINUED | OUTPATIENT
Start: 2023-12-22 | End: 2023-12-24

## 2023-12-22 RX ORDER — ACETAMINOPHEN 325 MG/1
650 TABLET ORAL EVERY 6 HOURS PRN
Status: DISCONTINUED | OUTPATIENT
Start: 2023-12-22 | End: 2024-01-01 | Stop reason: HOSPADM

## 2023-12-22 RX ORDER — ACETAMINOPHEN 650 MG/1
650 SUPPOSITORY RECTAL EVERY 6 HOURS PRN
Status: DISCONTINUED | OUTPATIENT
Start: 2023-12-22 | End: 2024-01-01 | Stop reason: HOSPADM

## 2023-12-22 RX ORDER — MAGNESIUM SULFATE IN WATER 40 MG/ML
2000 INJECTION, SOLUTION INTRAVENOUS PRN
Status: DISCONTINUED | OUTPATIENT
Start: 2023-12-22 | End: 2024-01-01 | Stop reason: HOSPADM

## 2023-12-22 RX ORDER — SODIUM CHLORIDE 9 MG/ML
INJECTION, SOLUTION INTRAVENOUS PRN
Status: DISCONTINUED | OUTPATIENT
Start: 2023-12-22 | End: 2024-01-01 | Stop reason: HOSPADM

## 2023-12-22 RX ORDER — ONDANSETRON 4 MG/1
4 TABLET, ORALLY DISINTEGRATING ORAL EVERY 8 HOURS PRN
Status: DISCONTINUED | OUTPATIENT
Start: 2023-12-22 | End: 2024-01-01 | Stop reason: HOSPADM

## 2023-12-22 RX ORDER — SODIUM CHLORIDE 0.9 % (FLUSH) 0.9 %
5-40 SYRINGE (ML) INJECTION PRN
Status: DISCONTINUED | OUTPATIENT
Start: 2023-12-22 | End: 2024-01-01 | Stop reason: HOSPADM

## 2023-12-22 RX ORDER — SODIUM CHLORIDE 0.9 % (FLUSH) 0.9 %
5-40 SYRINGE (ML) INJECTION EVERY 12 HOURS SCHEDULED
Status: DISCONTINUED | OUTPATIENT
Start: 2023-12-22 | End: 2024-01-01 | Stop reason: HOSPADM

## 2023-12-22 RX ORDER — CALCITONIN SALMON 200 [USP'U]/ML
100 INJECTION, SOLUTION INTRAMUSCULAR; SUBCUTANEOUS 2 TIMES DAILY
Status: COMPLETED | OUTPATIENT
Start: 2023-12-22 | End: 2023-12-22

## 2023-12-22 RX ORDER — ENOXAPARIN SODIUM 100 MG/ML
40 INJECTION SUBCUTANEOUS DAILY
Status: DISCONTINUED | OUTPATIENT
Start: 2023-12-23 | End: 2024-01-01 | Stop reason: HOSPADM

## 2023-12-22 RX ADMIN — IOPAMIDOL 75 ML: 755 INJECTION, SOLUTION INTRAVENOUS at 12:52

## 2023-12-22 RX ADMIN — SODIUM CHLORIDE 1000 ML: 9 INJECTION, SOLUTION INTRAVENOUS at 13:28

## 2023-12-22 RX ADMIN — SODIUM CHLORIDE: 9 INJECTION, SOLUTION INTRAVENOUS at 17:45

## 2023-12-22 RX ADMIN — CEFTRIAXONE SODIUM 1000 MG: 1 INJECTION, POWDER, FOR SOLUTION INTRAMUSCULAR; INTRAVENOUS at 15:11

## 2023-12-22 RX ADMIN — CALCITONIN SALMON 100 UNITS: 200 INJECTION, SOLUTION INTRAMUSCULAR; SUBCUTANEOUS at 13:58

## 2023-12-22 RX ADMIN — ZOLEDRONIC ACID 4 MG: 4 INJECTION, SOLUTION, CONCENTRATE INTRAVENOUS at 14:03

## 2023-12-22 RX ADMIN — CALCITONIN SALMON 100 UNITS: 200 INJECTION, SOLUTION INTRAMUSCULAR; SUBCUTANEOUS at 21:24

## 2023-12-22 RX ADMIN — SODIUM CHLORIDE 1000 ML: 9 INJECTION, SOLUTION INTRAVENOUS at 14:01

## 2023-12-22 ASSESSMENT — PAIN SCALES - GENERAL
PAINLEVEL_OUTOF10: 0
PAINLEVEL_OUTOF10: 0

## 2023-12-22 ASSESSMENT — PAIN - FUNCTIONAL ASSESSMENT: PAIN_FUNCTIONAL_ASSESSMENT: NONE - DENIES PAIN

## 2023-12-22 NOTE — ED NOTES
Mr. Concepcion is a 83 y.o. male who had concerns including Fatigue and Urinary Frequency (Per EMS family called for pt having generalized weakness progressive over the last few weeks and urinary frequency over the last week. Used to perform ADLs independently and is now unable to walk without assistance. Denies acute pain, n/v/d, or dysuria. Pt states \"feels fine\").    Chief Complaint   Patient presents with    Fatigue    Urinary Frequency     Per EMS family called for pt having generalized weakness progressive over the last few weeks and urinary frequency over the last week. Used to perform ADLs independently and is now unable to walk without assistance. Denies acute pain, n/v/d, or dysuria. Pt states \"feels fine\"       He is being admitted for:    <principal problem not specified>    His ED problem list included:    No diagnosis found.    Past Medical History:   Diagnosis Date    Actinic keratoses     Basal cell carcinoma nos     COVID-19 01/20/2021    Renal calculi     Skull fracture (HCC)     as a child       Past Surgical History:   Procedure Laterality Date    HERNIA REPAIR      right and left    MALIGNANT SKIN LESION EXCISION      UMBILICAL HERNIA REPAIR         His recent abnormal labs were:    Labs Reviewed   CBC WITH AUTO DIFFERENTIAL - Abnormal; Notable for the following components:       Result Value    WBC 12.6 (*)     Hemoglobin 13.0 (*)     Hematocrit 39.5 (*)     Neutrophils Absolute 10.6 (*)     All other components within normal limits   COMPREHENSIVE METABOLIC PANEL W/ REFLEX TO MG FOR LOW K - Abnormal; Notable for the following components:    Potassium reflex Magnesium 3.4 (*)     Glucose 152 (*)     BUN 22 (*)     Est, Glom Filt Rate 54 (*)     Calcium 15.5 (*)     Albumin/Globulin Ratio 1.0 (*)     All other components within normal limits    Narrative:     CALL  [place] tel. [phone],  Chemistry results called to and read back by Mela Khoury RN, 12/22/2023  12:48, by JUANY LAZARO -

## 2023-12-22 NOTE — CONSULTS
[facility] Consult Note           Reason for Consult:  Hypercalcemia  Requesting Physician:  Dr. Khan    Chief Complaint:  Hematuria  History Obtained From:  patient, electronic medical record    History of Present Ilness:    83 year old male with recent imaging this month revealing suspected bladder/liver mass, lung nodules presents with hematuria.  We have been asked to assist in further mgmt of his Hypercalcemia.    SCa 15.5 today, prior 10.0 from end of 2023.  +weak, +intake reduced, no shortness of breath, abdominal pain, tremors, or confusion.    Past Medical History:        Diagnosis Date    Actinic keratoses     Basal cell carcinoma nos     COVID-19 2021    Renal calculi     Skull fracture (HCC)     as a child       Past Surgical History:        Procedure Laterality Date    HERNIA REPAIR      right and left    MALIGNANT SKIN LESION EXCISION      UMBILICAL HERNIA REPAIR         Home Medications:    No current facility-administered medications on file prior to encounter.     Current Outpatient Medications on File Prior to Encounter   Medication Sig Dispense Refill    triamcinolone (KENALOG) 0.1 % cream Apply topically 2 times daily. (Patient not taking: Reported on 2023) 30 g 0    Multiple Vitamins-Minerals (MENS MULTIVITAMIN PLUS PO) Take by mouth         Allergies:  Patient has no known allergies.    Social History:    Social History     Socioeconomic History    Marital status:      Spouse name: Not on file    Number of children: Not on file    Years of education: Not on file    Highest education level: Not on file   Occupational History    Not on file   Tobacco Use    Smoking status: Former     Current packs/day: 0.00     Average packs/day: 0.5 packs/day for 1 year (0.5 ttl pk-yrs)     Types: Cigarettes     Start date: 1958     Quit date: 1959     Years since quittin.0    Smokeless tobacco: Former   Vaping Use     PM     12/22/2023 12:00 PM    MPV 7.0 12/22/2023 12:00 PM     BMP:    Lab Results   Component Value Date/Time     12/22/2023 12:00 PM    K 3.4 12/22/2023 12:00 PM    CL 99 12/22/2023 12:00 PM    CO2 29 12/22/2023 12:00 PM    BUN 22 12/22/2023 12:00 PM    LABALBU 3.4 12/22/2023 12:00 PM    CREATININE 1.3 12/22/2023 12:00 PM    CALCIUM 15.5 12/22/2023 12:00 PM    GFRAA >60 02/01/2021 06:04 AM    GFRAA >60 02/03/2011 10:43 AM    LABGLOM 54 12/22/2023 12:00 PM    GLUCOSE 152 12/22/2023 12:00 PM         Assessment/    - Hypercalcemia of malignancy +/- pre-renal component though degree of elevation not c/w dehydration alone    - Lactic acidosis      Plan/    - IVF's with NS, zometa, & calcitonin  - Trend labs, bp's, weights, & urine output for hopeful improvement    Case d/w ER team    Thank you for the consultation.  Please do not hesitate to call with questions.    ____________________________________  James Marina MD  [facility]  [email or web address]  Office: [phone]

## 2023-12-22 NOTE — PROGRESS NOTES
4 Eyes Skin Assessment     The patient is being assess for  Admission    I agree that 2 RN's have performed a thorough Head to Toe Skin Assessment on the patient. ALL assessment sites listed below have been assessed.       Areas assessed by both nurses: steve keita rn  [x]   Head, Face, and Ears   [x]   Shoulders, Back, and Chest  [x]   Arms, Elbows, and Hands   [x]   Coccyx, Sacrum, and Ischum  [x]   Legs, Feet, and Heels          Excoriation buttocks, scattered scabs and abrasions, redness and excoriated scrotum    Does the Patient have Skin Breakdown?  No         Ethan Prevention initiated:  No   Wound Care Orders initiated:  No      C nurse consulted for Pressure Injury (Stage 3,4, Unstageable, DTI, NWPT, and Complex wounds):  No      Nurse 1 eSignature: Electronically signed by Steve Qureshi RN on 12/22/23 at 5:40 PM EST    **SHARE this note so that the co-signing nurse is able to place an eSignature**    Nurse 2 eSignature: Electronically signed by Alexandrea Rosen RN on 12/22/23 at 6:09 PM EST

## 2023-12-22 NOTE — ED PROVIDER NOTES
[facility] TELE/MED SURG/ONC  EMERGENCY DEPARTMENT ENCOUNTER        Pt Name: Eliezer Concepcion  MRN: [id number]  Birthdate 1940  Date of evaluation: 12/22/2023  Provider: CHEN King  PCP: Melquiades Leon MD  Note Started: 6:44 PM EST        I have seen and evaluated this patient with my supervising physician MD Aspen.    CHIEF COMPLAINT       Chief Complaint   Patient presents with    Fatigue    Urinary Frequency     Per EMS family called for pt having generalized weakness progressive over the last few weeks and urinary frequency over the last week. Used to perform ADLs independently and is now unable to walk without assistance. Denies acute pain, n/v/d, or dysuria. Pt states \"feels fine\"       HISTORY OF PRESENT ILLNESS      Chief Complaint: Confusion and fatigue urinary frequency.    Eliezer Concepcion is a 83 y.o. male who presents to the emerged part for evaluation of confusion fatigue and urinary frequency.  Per family he was acting normally, and then over the last few days has rapidly become confused.  Diagnosed with possible cancer on the 15th.    Patient himself is confused, and unable to provide history.  No family members were present during my multiple visits to the room.    SCREENINGS    [place] Coma Scale  Eye Opening: Spontaneous  Best Verbal Response: Confused  Best Motor Response: Obeys commands  [place] Coma Scale Score: 14      Is this patient to be included in the SEP-1 Core Measure due to severe sepsis or septic shock?   No   Exclusion criteria - the patient is NOT to be included for SEP-1 Core Measure due to:  2+ SIRS criteria are not met      PHYSICAL EXAM     Vitals: BP (!) 163/71   Pulse 87   Temp 97.9 °F (36.6 °C) (Oral)   Resp 18   Ht 1.778 m (5' 10\")   Wt 70.8 kg (156 lb)   SpO2 91%   BMI 22.38 kg/m²    General: awake, no apparent distress  Pupils: equal, reactive  Head: Non-traumatic  Heart: Rate as noted, regular rhythm, no murmur or rubs.  Chest/Lungs: CTAB,  radiation dose to as low as reasonably achievable. COMPARISON: None. HISTORY: ORDERING SYSTEM PROVIDED HISTORY: Fatigue, elevated dimer, left leg swelling TECHNOLOGIST PROVIDED HISTORY: Reason for exam:->Fatigue, elevated dimer, left leg swelling Additional Contrast?->1 Reason for Exam: fatigue, elevated D dimer, left leg swelling, denies chest pain or SOB FINDINGS: Pulmonary Arteries: Pulmonary arteries are adequately opacified for evaluation.  No evidence of intraluminal filling defect to suggest pulmonary embolism.  Main pulmonary artery is normal in caliber. Mediastinum: There are multiple small mediastinal and hilar nodes this is most pronounced in the subcarinal region. Lungs/pleura: There are multiple too numerous to count noncalcified non cavitary pulmonary nodules.  Some of which appear to be coalescing. Upper Abdomen: Limited images of the upper abdomen are unremarkable. Soft Tissues/Bones: No acute bone or soft tissue abnormality.  No significant axillary adenopathy.     Multiple pulmonary nodules. Mediastinal and hilar adenopathy. Findings are most suspicious for metastatic disease, which is the diagnosis of exclusion.  Recommend further evaluation.     CT HEAD WO CONTRAST    Result Date: 12/22/2023  EXAMINATION: CT OF THE HEAD WITHOUT CONTRAST  12/22/2023 12:25 pm TECHNIQUE: CT of the head was performed without the administration of intravenous contrast. Automated exposure control, iterative reconstruction, and/or weight based adjustment of the mA/kV was utilized to reduce the radiation dose to as low as reasonably achievable. COMPARISON: None. HISTORY: ORDERING SYSTEM PROVIDED HISTORY: Confusion TECHNOLOGIST PROVIDED HISTORY: Reason for exam:->Confusion Has a \"code stroke\" or \"stroke alert\" been called?->No Decision Support Exception - unselect if not a suspected or confirmed emergency medical condition->Emergency Medical Condition (MA) Reason for Exam: weakness, confusion, ams today- less alert than

## 2023-12-22 NOTE — H&P
Hospital Medicine History & Physical      Date of Admission: 12/22/2023    Date of Service:  Pt seen/examined on 12/22/2023     [x]Admitted to Inpatient with expected LOS greater than two midnights due to medical therapy.  []Placed in Observation status.    Chief Admission Complaint:  Acute metabolic encephalopathy    Presenting Admission History:      83 y.o. male who presented to [facility] with confusion and hypercalcemia.  PMHx significant for new diagnosis of cancer with mets.  Patient presented to the emergency room for confusion according to the family, it seems he was diagnosed with the cancer with bladder mass and lung nodules concerning for metastatic disease, presented with some hematuria and was found to have hypercalcemia, patient is having some confusion although he is oriented to self and place but not to time.  It was hard to obtain clear information from the patient.  He denied any fever, chills, chest pain, shortness of breath but looked to be very dehydrated there was no family at bedside.  Labs showed potassium of 3.4, calcium of 15.5 with recent calcium from 11/28/2023 at 10.0.  Glucose was 152, white blood cells were elevated at 12.6 and his urine analysis showed large amount of blood with small leukocytosis, chest x-ray showed multiple pulmonary nodules worrisome for metastatic disease, CT scan head showed no acute finding.  He had recent CT scan abdomen on 12/4/2023 which showed multiple lung nodules bilateral up to 1.3 cm with recommendation for further testing including PET scan, also irregular wall thickening of the anterior bladder due to concern of a bladder mass.    Assessment/Plan:      Current Principal Problem:  Acute metabolic encephalopathy    1.  Acute metabolic encephalopathy most likely related to dehydration and hypercalcemia, should start to improve with improvement of calcium level..  2.  Hypercalcemia questionable if it is related to malignancy as his calcium was 10.0  for exam:->generalized weakness Reason for Exam: Fatigue; Urinary Frequency FINDINGS: Single-view chest demonstrates multiple nodules throughout the lungs with some consolidation in the bases.  The heart is normal in size.  The trachea is midline hilar symmetrical.     Multiple pulmonary nodules worrisome for metastatic disease.  Less likely this represents infiltrate or granulomatous disease.  Recommend further evaluation.  This is new compared to the prior study.     CT ABDOMEN PELVIS WO CONTRAST Additional Contrast? Radiologist Recommendation    Result Date: 12/6/2023  EXAMINATION: CT OF THE ABDOMEN AND PELVIS WITHOUT CONTRAST 12/4/2023 2:10 pm TECHNIQUE: CT of the abdomen and pelvis was performed without the administration of intravenous contrast. Multiplanar reformatted images are provided for review. Automated exposure control, iterative reconstruction, and/or weight based adjustment of the mA/kV was utilized to reduce the radiation dose to as low as reasonably achievable. COMPARISON: None. HISTORY: ORDERING SYSTEM PROVIDED HISTORY: Dark urine TECHNOLOGIST PROVIDED HISTORY: Additional Contrast?->Radiologist Recommendation Reason for exam:->hematuria Reason for Exam: 2 weeks dark urine, gross hematuria, painful urination FINDINGS: Lower Chest: There are innumerable solid nodules throughout the partially visualized lungs with the largest in the right lower lobe measuring 1.1 cm in the largest in the left lower lobe measuring 1.3 cm.  The heart is normal in size without pericardial effusion and there are severe coronary artery calcifications. Organs: The liver is normal in size with smooth contours. There are several simple fluid attenuating lesions throughout the liver with the largest measuring 3.7 cm. There is no biliary ductal dilatation the gallbladder is normally distended without radiopaque calculi. The spleen is normal. The adrenal glands are normal. The pancreas is normal in size without dilatation of

## 2023-12-23 LAB
ALBUMIN SERPL-MCNC: 3 G/DL (ref 3.4–5)
AMMONIA PLAS-SCNC: 31 UMOL/L (ref 16–60)
ANION GAP SERPL CALCULATED.3IONS-SCNC: 12 MMOL/L (ref 3–16)
BASOPHILS # BLD: 0.2 K/UL (ref 0–0.2)
BASOPHILS NFR BLD: 1.3 %
BUN SERPL-MCNC: 17 MG/DL (ref 7–20)
CA-I BLD-SCNC: 1.54 MMOL/L (ref 1.12–1.32)
CALCIUM SERPL-MCNC: 12.1 MG/DL (ref 8.3–10.6)
CHLORIDE SERPL-SCNC: 105 MMOL/L (ref 99–110)
CO2 SERPL-SCNC: 25 MMOL/L (ref 21–32)
CREAT SERPL-MCNC: 1.1 MG/DL (ref 0.8–1.3)
DEPRECATED RDW RBC AUTO: 13.2 % (ref 12.4–15.4)
EOSINOPHIL # BLD: 0 K/UL (ref 0–0.6)
EOSINOPHIL NFR BLD: 0.1 %
GFR SERPLBLD CREATININE-BSD FMLA CKD-EPI: >60 ML/MIN/{1.73_M2}
GLUCOSE SERPL-MCNC: 117 MG/DL (ref 70–99)
HCT VFR BLD AUTO: 41.6 % (ref 40.5–52.5)
HGB BLD-MCNC: 13.7 G/DL (ref 13.5–17.5)
LYMPHOCYTES # BLD: 0.4 K/UL (ref 1–5.1)
LYMPHOCYTES NFR BLD: 2.7 %
MCH RBC QN AUTO: 28.4 PG (ref 26–34)
MCHC RBC AUTO-ENTMCNC: 33 G/DL (ref 31–36)
MCV RBC AUTO: 86 FL (ref 80–100)
MONOCYTES # BLD: 0.6 K/UL (ref 0–1.3)
MONOCYTES NFR BLD: 3.6 %
NEUTROPHILS # BLD: 14.2 K/UL (ref 1.7–7.7)
NEUTROPHILS NFR BLD: 92.3 %
PH BLDV: 7.46 [PH] (ref 7.35–7.45)
PHOSPHATE SERPL-MCNC: 1.3 MG/DL (ref 2.5–4.9)
PLATELET # BLD AUTO: 425 K/UL (ref 135–450)
PMV BLD AUTO: 7.6 FL (ref 5–10.5)
POTASSIUM SERPL-SCNC: 3.5 MMOL/L (ref 3.5–5.1)
RBC # BLD AUTO: 4.84 M/UL (ref 4.2–5.9)
SODIUM SERPL-SCNC: 142 MMOL/L (ref 136–145)
WBC # BLD AUTO: 15.4 K/UL (ref 4–11)

## 2023-12-23 PROCEDURE — 82746 ASSAY OF FOLIC ACID SERUM: CPT

## 2023-12-23 PROCEDURE — 2580000003 HC RX 258: Performed by: INTERNAL MEDICINE

## 2023-12-23 PROCEDURE — 97162 PT EVAL MOD COMPLEX 30 MIN: CPT

## 2023-12-23 PROCEDURE — 6370000000 HC RX 637 (ALT 250 FOR IP): Performed by: INTERNAL MEDICINE

## 2023-12-23 PROCEDURE — 82140 ASSAY OF AMMONIA: CPT

## 2023-12-23 PROCEDURE — 97530 THERAPEUTIC ACTIVITIES: CPT

## 2023-12-23 PROCEDURE — 6360000002 HC RX W HCPCS: Performed by: INTERNAL MEDICINE

## 2023-12-23 PROCEDURE — 80069 RENAL FUNCTION PANEL: CPT

## 2023-12-23 PROCEDURE — 97166 OT EVAL MOD COMPLEX 45 MIN: CPT

## 2023-12-23 PROCEDURE — 82330 ASSAY OF CALCIUM: CPT

## 2023-12-23 PROCEDURE — 85025 COMPLETE CBC W/AUTO DIFF WBC: CPT

## 2023-12-23 PROCEDURE — 82607 VITAMIN B-12: CPT

## 2023-12-23 PROCEDURE — 84443 ASSAY THYROID STIM HORMONE: CPT

## 2023-12-23 PROCEDURE — 2700000000 HC OXYGEN THERAPY PER DAY

## 2023-12-23 PROCEDURE — 2500000003 HC RX 250 WO HCPCS: Performed by: INTERNAL MEDICINE

## 2023-12-23 PROCEDURE — 36415 COLL VENOUS BLD VENIPUNCTURE: CPT

## 2023-12-23 PROCEDURE — 1200000000 HC SEMI PRIVATE

## 2023-12-23 PROCEDURE — 94761 N-INVAS EAR/PLS OXIMETRY MLT: CPT

## 2023-12-23 RX ORDER — AMLODIPINE BESYLATE 5 MG/1
5 TABLET ORAL DAILY
Status: DISCONTINUED | OUTPATIENT
Start: 2023-12-23 | End: 2024-01-01 | Stop reason: HOSPADM

## 2023-12-23 RX ORDER — MECOBALAMIN 5000 MCG
5 TABLET,DISINTEGRATING ORAL NIGHTLY
Status: DISCONTINUED | OUTPATIENT
Start: 2023-12-23 | End: 2024-01-01 | Stop reason: HOSPADM

## 2023-12-23 RX ADMIN — Medication 5 MG: at 20:24

## 2023-12-23 RX ADMIN — SODIUM PHOSPHATE, MONOBASIC, MONOHYDRATE AND SODIUM PHOSPHATE, DIBASIC, ANHYDROUS 15 MMOL: 142; 276 INJECTION, SOLUTION INTRAVENOUS at 18:05

## 2023-12-23 RX ADMIN — SODIUM CHLORIDE: 9 INJECTION, SOLUTION INTRAVENOUS at 18:04

## 2023-12-23 RX ADMIN — CEFTRIAXONE SODIUM 1000 MG: 1 INJECTION, POWDER, FOR SOLUTION INTRAMUSCULAR; INTRAVENOUS at 09:57

## 2023-12-23 RX ADMIN — ENOXAPARIN SODIUM 40 MG: 100 INJECTION SUBCUTANEOUS at 09:57

## 2023-12-23 ASSESSMENT — PAIN SCALES - GENERAL: PAINLEVEL_OUTOF10: 0

## 2023-12-23 NOTE — PROGRESS NOTES
Pt is alert and oriented at times with confusion. VSS. 2 L NC. SpO2 90-91% on the 2 L NC. Breath sounds clear and diminished. AVASYS in place for safety. Now sitter at bedside of pt trying to get out of bed several times. MD does not want to sedate pt. Shift assessment completed and documented. Call light within reach. Bed side table within reach. Wheels locked. Bed in lowest position. Bed check in place. Pt instructed to call out for assistance. Pt expressesed understanding & calls out appropritately. All care per orders. Electronically signed by Mery Ortiz RN on 12/23/2023 at 6:58 PM     Methotrexate Pregnancy And Lactation Text: This medication is Pregnancy Category X and is known to cause fetal harm. This medication is excreted in breast milk. Tremfya Pregnancy And Lactation Text: The risk during pregnancy and breastfeeding is uncertain with this medication. Ivermectin Pregnancy And Lactation Text: This medication is Pregnancy Category C and it isn't known if it is safe during pregnancy. It is also excreted in breast milk. Azithromycin Counseling:  I discussed with the patient the risks of azithromycin including but not limited to GI upset, allergic reaction, drug rash, diarrhea, and yeast infections. Ketoconazole Counseling:   Patient counseled regarding improving absorption with orange juice.  Adverse effects include but are not limited to breast enlargement, headache, diarrhea, nausea, upset stomach, liver function test abnormalities, taste disturbance, and stomach pain.  There is a rare possibility of liver failure that can occur when taking ketoconazole. The patient understands that monitoring of LFTs may be required, especially at baseline. The patient verbalized understanding of the proper use and possible adverse effects of ketoconazole.  All of the patient's questions and concerns were addressed. Tazorac Counseling:  Patient advised that medication is irritating and drying.  Patient may need to apply sparingly and wash off after an hour before eventually leaving it on overnight.  The patient verbalized understanding of the proper use and possible adverse effects of tazorac.  All of the patient's questions and concerns were addressed. Tremfya Counseling: I discussed with the patient the risks of guselkumab including but not limited to immunosuppression, serious infections, worsening of inflammatory bowel disease and drug reactions.  The patient understands that monitoring is required including a PPD at baseline and must alert us or the primary physician if symptoms of infection or other concerning signs are noted. Imiquimod Pregnancy And Lactation Text: This medication is Pregnancy Category C. It is unknown if this medication is excreted in breast milk. Gabapentin Counseling: I discussed with the patient the risks of gabapentin including but not limited to dizziness, somnolence, fatigue and ataxia. Opioid Counseling: I discussed with the patient the potential side effects of opioids including but not limited to addiction, altered mental status, and depression. I stressed avoiding alcohol, benzodiazepines, muscle relaxants and sleep aids unless specifically okayed by a physician. The patient verbalized understanding of the proper use and possible adverse effects of opioids. All of the patient's questions and concerns were addressed. They were instructed to flush the remaining pills down the toilet if they did not need them for pain. Minocycline Counseling: Patient advised regarding possible photosensitivity and discoloration of the teeth, skin, lips, tongue and gums.  Patient instructed to avoid sunlight, if possible.  When exposed to sunlight, patients should wear protective clothing, sunglasses, and sunscreen.  The patient was instructed to call the office immediately if the following severe adverse effects occur:  hearing changes, easy bruising/bleeding, severe headache, or vision changes.  The patient verbalized understanding of the proper use and possible adverse effects of minocycline.  All of the patient's questions and concerns were addressed. Ivermectin Counseling:  Patient instructed to take medication on an empty stomach with a full glass of water.  Patient informed of potential adverse effects including but not limited to nausea, diarrhea, dizziness, itching, and swelling of the extremities or lymph nodes.  The patient verbalized understanding of the proper use and possible adverse effects of ivermectin.  All of the patient's questions and concerns were addressed. Benzoyl Peroxide Pregnancy And Lactation Text: This medication is Pregnancy Category C. It is unknown if benzoyl peroxide is excreted in breast milk. Methotrexate Counseling:  Patient counseled regarding adverse effects of methotrexate including but not limited to nausea, vomiting, abnormalities in liver function tests. Patients may develop mouth sores, rash, diarrhea, and abnormalities in blood counts. The patient understands that monitoring is required including LFT's and blood counts.  There is a rare possibility of scarring of the liver and lung problems that can occur when taking methotrexate. Persistent nausea, loss of appetite, pale stools, dark urine, cough, and shortness of breath should be reported immediately. Patient advised to discontinue methotrexate treatment at least three months before attempting to become pregnant.  I discussed the need for folate supplements while taking methotrexate.  These supplements can decrease side effects during methotrexate treatment. The patient verbalized understanding of the proper use and possible adverse effects of methotrexate.  All of the patient's questions and concerns were addressed. Itraconazole Pregnancy And Lactation Text: This medication is Pregnancy Category C and it isn't know if it is safe during pregnancy. It is also excreted in breast milk. Use Enhanced Medication Counseling?: No Opioid Pregnancy And Lactation Text: These medications can lead to premature delivery and should be avoided during pregnancy. These medications are also present in breast milk in small amounts. Minoxidil Counseling: Minoxidil is a topical medication which can increase blood flow where it is applied. It is uncertain how this medication increases hair growth. Side effects are uncommon and include stinging and allergic reactions. Tazorac Pregnancy And Lactation Text: This medication is not safe during pregnancy. It is unknown if this medication is excreted in breast milk. Ilumya Counseling: I discussed with the patient the risks of tildrakizumab including but not limited to immunosuppression, malignancy, posterior leukoencephalopathy syndrome, and serious infections.  The patient understands that monitoring is required including a PPD at baseline and must alert us or the primary physician if symptoms of infection or other concerning signs are noted. Gabapentin Pregnancy And Lactation Text: This medication is Pregnancy Category C and isn't considered safe during pregnancy. It is excreted in breast milk. Oxybutynin Pregnancy And Lactation Text: This medication is Pregnancy Category B and is considered safe during pregnancy. It is unknown if it is excreted in breast milk. Benzoyl Peroxide Counseling: Patient counseled that medicine may cause skin irritation and bleach clothing.  In the event of skin irritation, the patient was advised to reduce the amount of the drug applied or use it less frequently.   The patient verbalized understanding of the proper use and possible adverse effects of benzoyl peroxide.  All of the patient's questions and concerns were addressed. Cyclosporine Pregnancy And Lactation Text: This medication is Pregnancy Category C and it isn't know if it is safe during pregnancy. This medication is excreted in breast milk. Minocycline Pregnancy And Lactation Text: This medication is Pregnancy Category D and not consider safe during pregnancy. It is also excreted in breast milk. Itraconazole Counseling:  I discussed with the patient the risks of itraconazole including but not limited to liver damage, nausea/vomiting, neuropathy, and severe allergy.  The patient understands that this medication is best absorbed when taken with acidic beverages such as non-diet cola or ginger ale.  The patient understands that monitoring is required including baseline LFTs and repeat LFTs at intervals.  The patient understands that they are to contact us or the primary physician if concerning signs are noted. Azithromycin Pregnancy And Lactation Text: This medication is considered safe during pregnancy and is also secreted in breast milk. Humira Pregnancy And Lactation Text: This medication is Pregnancy Category B and is considered safe during pregnancy. It is unknown if this medication is excreted in breast milk. Propranolol Counseling:  I discussed with the patient the risks of propranolol including but not limited to low heart rate, low blood pressure, low blood sugar, restlessness and increased cold sensitivity. They should call the office if they experience any of these side effects. Minoxidil Pregnancy And Lactation Text: This medication has not been assigned a Pregnancy Risk Category but animal studies failed to show danger with the topical medication. It is unknown if the medication is excreted in breast milk. Topical Clindamycin Counseling: Patient counseled that this medication may cause skin irritation or allergic reactions.  In the event of skin irritation, the patient was advised to reduce the amount of the drug applied or use it less frequently.   The patient verbalized understanding of the proper use and possible adverse effects of clindamycin.  All of the patient's questions and concerns were addressed. Taltz Counseling: I discussed with the patient the risks of ixekizumab including but not limited to immunosuppression, serious infections, worsening of inflammatory bowel disease and drug reactions.  The patient understands that monitoring is required including a PPD at baseline and must alert us or the primary physician if symptoms of infection or other concerning signs are noted. Quinolones Counseling:  I discussed with the patient the risks of fluoroquinolones including but not limited to GI upset, allergic reaction, drug rash, diarrhea, dizziness, photosensitivity, yeast infections, liver function test abnormalities, tendonitis/tendon rupture. Albendazole Counseling:  I discussed with the patient the risks of albendazole including but not limited to cytopenia, kidney damage, nausea/vomiting and severe allergy.  The patient understands that this medication is being used in an off-label manner. Cyclosporine Counseling:  I discussed with the patient the risks of cyclosporine including but not limited to hypertension, gingival hyperplasia,myelosuppression, immunosuppression, liver damage, kidney damage, neurotoxicity, lymphoma, and serious infections. The patient understands that monitoring is required including baseline blood pressure, CBC, CMP, lipid panel and uric acid, and then 1-2 times monthly CMP and blood pressure. Propranolol Pregnancy And Lactation Text: This medication is Pregnancy Category C and it isn't known if it is safe during pregnancy. It is excreted in breast milk. Mirvaso Counseling: Mirvaso is a topical medication which can decrease superficial blood flow where applied. Side effects are uncommon and include stinging, redness and allergic reactions. Bactrim Counseling:  I discussed with the patient the risks of sulfa antibiotics including but not limited to GI upset, allergic reaction, drug rash, diarrhea, dizziness, photosensitivity, and yeast infections.  Rarely, more serious reactions can occur including but not limited to aplastic anemia, agranulocytosis, methemoglobinemia, blood dyscrasias, liver or kidney failure, lung infiltrates or desquamative/blistering drug rashes. Griseofulvin Pregnancy And Lactation Text: This medication is Pregnancy Category X and is known to cause serious birth defects. It is unknown if this medication is excreted in breast milk but breast feeding should be avoided. Humira Counseling:  I discussed with the patient the risks of adalimumab including but not limited to myelosuppression, immunosuppression, autoimmune hepatitis, demyelinating diseases, lymphoma, and serious infections.  The patient understands that monitoring is required including a PPD at baseline and must alert us or the primary physician if symptoms of infection or other concerning signs are noted. Glycopyrrolate Counseling:  I discussed with the patient the risks of glycopyrrolate including but not limited to skin rash, drowsiness, dry mouth, difficulty urinating, and blurred vision. High Dose Vitamin A Pregnancy And Lactation Text: High dose vitamin A therapy is contraindicated during pregnancy and breast feeding. Arava Counseling:  Patient counseled regarding adverse effects of Arava including but not limited to nausea, vomiting, abnormalities in liver function tests. Patients may develop mouth sores, rash, diarrhea, and abnormalities in blood counts. The patient understands that monitoring is required including LFTs and blood counts.  There is a rare possibility of scarring of the liver and lung problems that can occur when taking methotrexate. Persistent nausea, loss of appetite, pale stools, dark urine, cough, and shortness of breath should be reported immediately. Patient advised to discontinue Arava treatment and consult with a physician prior to attempting conception. The patient will have to undergo a treatment to eliminate Arava from the body prior to conception. Cyclophosphamide Pregnancy And Lactation Text: This medication is Pregnancy Category D and it isn't considered safe during pregnancy. This medication is excreted in breast milk. Griseofulvin Counseling:  I discussed with the patient the risks of griseofulvin including but not limited to photosensitivity, cytopenia, liver damage, nausea/vomiting and severe allergy.  The patient understands that this medication is best absorbed when taken with a fatty meal (e.g., ice cream or french fries). Glycopyrrolate Pregnancy And Lactation Text: This medication is Pregnancy Category B and is considered safe during pregnancy. It is unknown if it is excreted breast milk. Birth Control Pills Counseling: Birth Control Pill Counseling: I discussed with the patient the potential side effects of OCPs including but not limited to increased risk of stroke, heart attack, thrombophlebitis, deep venous thrombosis, hepatic adenomas, breast changes, GI upset, headaches, and depression.  The patient verbalized understanding of the proper use and possible adverse effects of OCPs. All of the patient's questions and concerns were addressed. High Dose Vitamin A Counseling: Side effects reviewed, pt to contact office should one occur. Bactrim Pregnancy And Lactation Text: This medication is Pregnancy Category D and is known to cause fetal risk.  It is also excreted in breast milk. Stelara Counseling:  I discussed with the patient the risks of ustekinumab including but not limited to immunosuppression, malignancy, posterior leukoencephalopathy syndrome, and serious infections.  The patient understands that monitoring is required including a PPD at baseline and must alert us or the primary physician if symptoms of infection or other concerning signs are noted. Arava Pregnancy And Lactation Text: This medication is Pregnancy Category X and is absolutely contraindicated during pregnancy. It is unknown if it is excreted in breast milk. Mirvaso Pregnancy And Lactation Text: This medication has not been assigned a Pregnancy Risk Category. It is unknown if the medication is excreted in breast milk. Cyclophosphamide Counseling:  I discussed with the patient the risks of cyclophosphamide including but not limited to hair loss, hormonal abnormalities, decreased fertility, abdominal pain, diarrhea, nausea and vomiting, bone marrow suppression and infection. The patient understands that monitoring is required while taking this medication. Rifampin Counseling: I discussed with the patient the risks of rifampin including but not limited to liver damage, kidney damage, red-orange body fluids, nausea/vomiting and severe allergy. Hydroxyzine Pregnancy And Lactation Text: This medication is not safe during pregnancy and should not be taken. It is also excreted in breast milk and breast feeding isn't recommended. Cephalexin Counseling: I counseled the patient regarding use of cephalexin as an antibiotic for prophylactic and/or therapeutic purposes. Cephalexin (commonly prescribed under brand name Keflex) is a cephalosporin antibiotic which is active against numerous classes of bacteria, including most skin bacteria. Side effects may include nausea, diarrhea, gastrointestinal upset, rash, hives, yeast infections, and in rare cases, hepatitis, kidney disease, seizures, fever, confusion, neurologic symptoms, and others. Patients with severe allergies to penicillin medications are cautioned that there is about a 10% incidence of cross-reactivity with cephalosporins. When possible, patients with penicillin allergies should use alternatives to cephalosporins for antibiotic therapy. Hydroxychloroquine Counseling:  I discussed with the patient that a baseline ophthalmologic exam is needed at the start of therapy and every year thereafter while on therapy. A CBC may also be warranted for monitoring.  The side effects of this medication were discussed with the patient, including but not limited to agranulocytosis, aplastic anemia, seizures, rashes, retinopathy, and liver toxicity. Patient instructed to call the office should any adverse effect occur.  The patient verbalized understanding of the proper use and possible adverse effects of Plaquenil.  All the patient's questions and concerns were addressed. Enbrel Counseling:  I discussed with the patient the risks of etanercept including but not limited to myelosuppression, immunosuppression, autoimmune hepatitis, demyelinating diseases, lymphoma, and infections.  The patient understands that monitoring is required including a PPD at baseline and must alert us or the primary physician if symptoms of infection or other concerning signs are noted. Birth Control Pills Pregnancy And Lactation Text: This medication should be avoided if pregnant and for the first 30 days post-partum. Picato Counseling:  I discussed with the patient the risks of Picato including but not limited to erythema, scaling, itching, weeping, crusting, and pain. Isotretinoin Pregnancy And Lactation Text: This medication is Pregnancy Category X and is considered extremely dangerous during pregnancy. It is unknown if it is excreted in breast milk. Clofazimine Counseling:  I discussed with the patient the risks of clofazimine including but not limited to skin and eye pigmentation, liver damage, nausea/vomiting, gastrointestinal bleeding and allergy. Cellcept Pregnancy And Lactation Text: This medication is Pregnancy Category D and isn't considered safe during pregnancy. It is unknown if this medication is excreted in breast milk. Rifampin Pregnancy And Lactation Text: This medication is Pregnancy Category C and it isn't know if it is safe during pregnancy. It is also excreted in breast milk and should not be used if you are breast feeding. Hydroxyzine Counseling: Patient advised that the medication is sedating and not to drive a car after taking this medication.  Patient informed of potential adverse effects including but not limited to dry mouth, urinary retention, and blurry vision.  The patient verbalized understanding of the proper use and possible adverse effects of hydroxyzine.  All of the patient's questions and concerns were addressed. Spironolactone Counseling: Patient advised regarding risks of diarrhea, abdominal pain, hyperkalemia, birth defects (for female patients), liver toxicity and renal toxicity. The patient may need blood work to monitor liver and kidney function and potassium levels while on therapy. The patient verbalized understanding of the proper use and possible adverse effects of spironolactone.  All of the patient's questions and concerns were addressed. Cephalexin Pregnancy And Lactation Text: This medication is Pregnancy Category B and considered safe during pregnancy.  It is also excreted in breast milk but can be used safely for shorter doses. Drysol Counseling:  I discussed with the patient the risks of drysol/aluminum chloride including but not limited to skin rash, itching, irritation, burning. Fluconazole Counseling:  Patient counseled regarding adverse effects of fluconazole including but not limited to headache, diarrhea, nausea, upset stomach, liver function test abnormalities, taste disturbance, and stomach pain.  There is a rare possibility of liver failure that can occur when taking fluconazole.  The patient understands that monitoring of LFTs and kidney function test may be required, especially at baseline. The patient verbalized understanding of the proper use and possible adverse effects of fluconazole.  All of the patient's questions and concerns were addressed. Dupixent Pregnancy And Lactation Text: This medication likely crosses the placenta but the risk for the fetus is uncertain. This medication is excreted in breast milk. Hydroxychloroquine Pregnancy And Lactation Text: This medication has been shown to cause fetal harm but it isn't assigned a Pregnancy Risk Category. There are small amounts excreted in breast milk. Skyrizi Counseling: I discussed with the patient the risks of risankizumab-rzaa including but not limited to immunosuppression, and serious infections.  The patient understands that monitoring is required including a PPD at baseline and must alert us or the primary physician if symptoms of infection or other concerning signs are noted. Cellcept Counseling:  I discussed with the patient the risks of mycophenolate mofetil including but not limited to infection/immunosuppression, GI upset, hypokalemia, hypercholesterolemia, bone marrow suppression, lymphoproliferative disorders, malignancy, GI ulceration/bleed/perforation, colitis, interstitial lung disease, kidney failure, progressive multifocal leukoencephalopathy, and birth defects.  The patient understands that monitoring is required including a baseline creatinine and regular CBC testing. In addition, patient must alert us immediately if symptoms of infection or other concerning signs are noted. Isotretinoin Counseling: Patient should get monthly blood tests, not donate blood, not drive at night if vision affected, not share medication, and not undergo elective surgery for 6 months after tx completed. Side effects reviewed, pt to contact office should one occur. Sarecycline Counseling: Patient advised regarding possible photosensitivity and discoloration of the teeth, skin, lips, tongue and gums.  Patient instructed to avoid sunlight, if possible.  When exposed to sunlight, patients should wear protective clothing, sunglasses, and sunscreen.  The patient was instructed to call the office immediately if the following severe adverse effects occur:  hearing changes, easy bruising/bleeding, severe headache, or vision changes.  The patient verbalized understanding of the proper use and possible adverse effects of sarecycline.  All of the patient's questions and concerns were addressed. Dupixent Counseling: I discussed with the patient the risks of dupilumab including but not limited to eye infection and irritation, cold sores, injection site reactions, worsening of asthma, allergic reactions and increased risk of parasitic infection.  Live vaccines should be avoided while taking dupilumab. Dupilumab will also interact with certain medications such as warfarin and cyclosporine. The patient understands that monitoring is required and they must alert us or the primary physician if symptoms of infection or other concerning signs are noted. Niacinamide Counseling: I recommended taking niacin or niacinamide, also know as vitamin B3, twice daily. Recent evidence suggests that taking vitamin B3 (500 mg twice daily) can reduce the risk of actinic keratoses and non-melanoma skin cancers. Side effects of vitamin B3 include flushing and headache. Doxepin Pregnancy And Lactation Text: This medication is Pregnancy Category C and it isn't known if it is safe during pregnancy. It is also excreted in breast milk and breast feeding isn't recommended. Spironolactone Pregnancy And Lactation Text: This medication can cause feminization of the male fetus and should be avoided during pregnancy. The active metabolite is also found in breast milk. Colchicine Counseling:  Patient counseled regarding adverse effects including but not limited to stomach upset (nausea, vomiting, stomach pain, or diarrhea).  Patient instructed to limit alcohol consumption while taking this medication.  Colchicine may reduce blood counts especially with prolonged use.  The patient understands that monitoring of kidney function and blood counts may be required, especially at baseline. The patient verbalized understanding of the proper use and possible adverse effects of colchicine.  All of the patient's questions and concerns were addressed. Protopic Counseling: Patient may experience a mild burning sensation during topical application. Protopic is not approved in children less than 2 years of age. There have been case reports of hematologic and skin malignancies in patients using topical calcineurin inhibitors although causality is questionable. Drysol Pregnancy And Lactation Text: This medication is considered safe during pregnancy and breast feeding. Bexarotene Pregnancy And Lactation Text: This medication is Pregnancy Category X and should not be given to women who are pregnant or may become pregnant. This medication should not be used if you are breast feeding. Clindamycin Counseling: I counseled the patient regarding use of clindamycin as an antibiotic for prophylactic and/or therapeutic purposes. Clindamycin is active against numerous classes of bacteria, including skin bacteria. Side effects may include nausea, diarrhea, gastrointestinal upset, rash, hives, yeast infections, and in rare cases, colitis. Niacinamide Pregnancy And Lactation Text: These medications are considered safe during pregnancy. Doxepin Counseling:  Patient advised that the medication is sedating and not to drive a car after taking this medication. Patient informed of potential adverse effects including but not limited to dry mouth, urinary retention, and blurry vision.  The patient verbalized understanding of the proper use and possible adverse effects of doxepin.  All of the patient's questions and concerns were addressed. SSKI Counseling:  I discussed with the patient the risks of SSKI including but not limited to thyroid abnormalities, metallic taste, GI upset, fever, headache, acne, arthralgias, paraesthesias, lymphadenopathy, easy bleeding, arrhythmias, and allergic reaction. Clindamycin Pregnancy And Lactation Text: This medication can be used in pregnancy if certain situations. Clindamycin is also present in breast milk. Protopic Pregnancy And Lactation Text: This medication is Pregnancy Category C. It is unknown if this medication is excreted in breast milk when applied topically. Elidel Counseling: Patient may experience a mild burning sensation during topical application. Elidel is not approved in children less than 2 years of age. There have been case reports of hematologic and skin malignancies in patients using topical calcineurin inhibitors although causality is questionable. Simponi Counseling:  I discussed with the patient the risks of golimumab including but not limited to myelosuppression, immunosuppression, autoimmune hepatitis, demyelinating diseases, lymphoma, and serious infections.  The patient understands that monitoring is required including a PPD at baseline and must alert us or the primary physician if symptoms of infection or other concerning signs are noted. Azathioprine Counseling:  I discussed with the patient the risks of azathioprine including but not limited to myelosuppression, immunosuppression, hepatotoxicity, lymphoma, and infections.  The patient understands that monitoring is required including baseline LFTs, Creatinine, possible TPMP genotyping and weekly CBCs for the first month and then every 2 weeks thereafter.  The patient verbalized understanding of the proper use and possible adverse effects of azathioprine.  All of the patient's questions and concerns were addressed. Bexarotene Counseling:  I discussed with the patient the risks of bexarotene including but not limited to hair loss, dry lips/skin/eyes, liver abnormalities, hyperlipidemia, pancreatitis, depression/suicidal ideation, photosensitivity, drug rash/allergic reactions, hypothyroidism, anemia, leukopenia, infection, cataracts, and teratogenicity.  Patient understands that they will need regular blood tests to check lipid profile, liver function tests, white blood cell count, thyroid function tests and pregnancy test if applicable. Topical Sulfur Applications Counseling: Topical Sulfur Counseling: Patient counseled that this medication may cause skin irritation or allergic reactions.  In the event of skin irritation, the patient was advised to reduce the amount of the drug applied or use it less frequently.   The patient verbalized understanding of the proper use and possible adverse effects of topical sulfur application.  All of the patient's questions and concerns were addressed. Tetracycline Counseling: Patient counseled regarding possible photosensitivity and increased risk for sunburn.  Patient instructed to avoid sunlight, if possible.  When exposed to sunlight, patients should wear protective clothing, sunglasses, and sunscreen.  The patient was instructed to call the office immediately if the following severe adverse effects occur:  hearing changes, easy bruising/bleeding, severe headache, or vision changes.  The patient verbalized understanding of the proper use and possible adverse effects of tetracycline.  All of the patient's questions and concerns were addressed. Patient understands to avoid pregnancy while on therapy due to potential birth defects. Cimetidine Pregnancy And Lactation Text: This medication is Pregnancy Category B and is considered safe during pregnancy. It is also excreted in breast milk and breast feeding isn't recommended. Cosentyx Counseling:  I discussed with the patient the risks of Cosentyx including but not limited to worsening of Crohn's disease, immunosuppression, allergic reactions and infections.  The patient understands that monitoring is required including a PPD at baseline and must alert us or the primary physician if symptoms of infection or other concerning signs are noted. Nsaids Counseling: NSAID Counseling: I discussed with the patient that NSAIDs should be taken with food. Prolonged use of NSAIDs can result in the development of stomach ulcers.  Patient advised to stop taking NSAIDs if abdominal pain occurs.  The patient verbalized understanding of the proper use and possible adverse effects of NSAIDs.  All of the patient's questions and concerns were addressed. Sski Pregnancy And Lactation Text: This medication is Pregnancy Category D and isn't considered safe during pregnancy. It is excreted in breast milk. Doxycycline Counseling:  Patient counseled regarding possible photosensitivity and increased risk for sunburn.  Patient instructed to avoid sunlight, if possible.  When exposed to sunlight, patients should wear protective clothing, sunglasses, and sunscreen.  The patient was instructed to call the office immediately if the following severe adverse effects occur:  hearing changes, easy bruising/bleeding, severe headache, or vision changes.  The patient verbalized understanding of the proper use and possible adverse effects of doxycycline.  All of the patient's questions and concerns were addressed. Dapsone Counseling: I discussed with the patient the risks of dapsone including but not limited to hemolytic anemia, agranulocytosis, rashes, methemoglobinemia, kidney failure, peripheral neuropathy, headaches, GI upset, and liver toxicity.  Patients who start dapsone require monitoring including baseline LFTs and weekly CBCs for the first month, then every month thereafter.  The patient verbalized understanding of the proper use and possible adverse effects of dapsone.  All of the patient's questions and concerns were addressed. Rhofade Counseling: Rhofade is a topical medication which can decrease superficial blood flow where applied. Side effects are uncommon and include stinging, redness and allergic reactions. Topical Sulfur Applications Pregnancy And Lactation Text: This medication is Pregnancy Category C and has an unknown safety profile during pregnancy. It is unknown if this topical medication is excreted in breast milk. Acitretin Pregnancy And Lactation Text: This medication is Pregnancy Category X and should not be given to women who are pregnant or may become pregnant in the future. This medication is excreted in breast milk. 5-Fu Pregnancy And Lactation Text: This medication is Pregnancy Category X and contraindicated in pregnancy and in women who may become pregnant. It is unknown if this medication is excreted in breast milk. Cimzia Pregnancy And Lactation Text: This medication crosses the placenta but can be considered safe in certain situations. Cimzia may be excreted in breast milk. Siliq Counseling:  I discussed with the patient the risks of Siliq including but not limited to new or worsening depression, suicidal thoughts and behavior, immunosuppression, malignancy, posterior leukoencephalopathy syndrome, and serious infections.  The patient understands that monitoring is required including a PPD at baseline and must alert us or the primary physician if symptoms of infection or other concerning signs are noted. There is also a special program designed to monitor depression which is required with Siliq. Nsaids Pregnancy And Lactation Text: These medications are considered safe up to 30 weeks gestation. It is excreted in breast milk. Cimetidine Counseling:  I discussed with the patient the risks of Cimetidine including but not limited to gynecomastia, headache, diarrhea, nausea, drowsiness, arrhythmias, pancreatitis, skin rashes, psychosis, bone marrow suppression and kidney toxicity. Thalidomide Counseling: I discussed with the patient the risks of thalidomide including but not limited to birth defects, anxiety, weakness, chest pain, dizziness, cough and severe allergy. Doxycycline Pregnancy And Lactation Text: This medication is Pregnancy Category D and not consider safe during pregnancy. It is also excreted in breast milk but is considered safe for shorter treatment courses. 5-Fu Counseling: 5-Fluorouracil Counseling:  I discussed with the patient the risks of 5-fluorouracil including but not limited to erythema, scaling, itching, weeping, crusting, and pain. Wartpeel Counseling:  I discussed with the patient the risks of Wartpeel including but not limited to erythema, scaling, itching, weeping, crusting, and pain. Acitretin Counseling:  I discussed with the patient the risks of acitretin including but not limited to hair loss, dry lips/skin/eyes, liver damage, hyperlipidemia, depression/suicidal ideation, photosensitivity.  Serious rare side effects can include but are not limited to pancreatitis, pseudotumor cerebri, bony changes, clot formation/stroke/heart attack.  Patient understands that alcohol is contraindicated since it can result in liver toxicity and significantly prolong the elimination of the drug by many years. Dapsone Pregnancy And Lactation Text: This medication is Pregnancy Category C and is not considered safe during pregnancy or breast feeding. Cimzia Counseling:  I discussed with the patient the risks of Cimzia including but not limited to immunosuppression, allergic reactions and infections.  The patient understands that monitoring is required including a PPD at baseline and must alert us or the primary physician if symptoms of infection or other concerning signs are noted. Odomzo Counseling- I discussed with the patient the risks of Odomzo including but not limited to nausea, vomiting, diarrhea, constipation, weight loss, changes in the sense of taste, decreased appetite, muscle spasms, and hair loss.  The patient verbalized understanding of the proper use and possible adverse effects of Odomzo.  All of the patient's questions and concerns were addressed. Erythromycin Counseling:  I discussed with the patient the risks of erythromycin including but not limited to GI upset, allergic reaction, drug rash, diarrhea, increase in liver enzymes, and yeast infections. Eucrisa Counseling: Patient may experience a mild burning sensation during topical application. Eucrisa is not approved in children less than 2 years of age. Xolair Pregnancy And Lactation Text: This medication is Pregnancy Category B and is considered safe during pregnancy. This medication is excreted in breast milk. Erivedge Counseling- I discussed with the patient the risks of Erivedge including but not limited to nausea, vomiting, diarrhea, constipation, weight loss, changes in the sense of taste, decreased appetite, muscle spasms, and hair loss.  The patient verbalized understanding of the proper use and possible adverse effects of Erivedge.  All of the patient's questions and concerns were addressed. Calcipotriene Pregnancy And Lactation Text: This medication has not been proven safe during pregnancy. It is unknown if this medication is excreted in breast milk. Tranexamic Acid Counseling:  Patient advised of the small risk of bleeding problems with tranexamic acid. They were also instructed to call if they developed any nausea, vomiting or diarrhea. All of the patient's questions and concerns were addressed. Rituxan Pregnancy And Lactation Text: This medication is Pregnancy Category C and it isn't know if it is safe during pregnancy. It is unknown if this medication is excreted in breast milk but similar antibodies are known to be excreted. Solaraze Counseling:  I discussed with the patient the risks of Solaraze including but not limited to erythema, scaling, itching, weeping, crusting, and pain. Xolair Counseling:  Patient informed of potential adverse effects including but not limited to fever, muscle aches, rash and allergic reactions.  The patient verbalized understanding of the proper use and possible adverse effects of Xolair.  All of the patient's questions and concerns were addressed. Calcipotriene Counseling:  I discussed with the patient the risks of calcipotriene including but not limited to erythema, scaling, itching, and irritation. Zyclara Counseling:  I discussed with the patient the risks of imiquimod including but not limited to erythema, scaling, itching, weeping, crusting, and pain.  Patient understands that the inflammatory response to imiquimod is variable from person to person and was educated regarded proper titration schedule.  If flu-like symptoms develop, patient knows to discontinue the medication and contact us. Rituxan Counseling:  I discussed with the patient the risks of Rituxan infusions. Side effects can include infusion reactions, severe drug rashes including mucocutaneous reactions, reactivation of latent hepatitis and other infections and rarely progressive multifocal leukoencephalopathy.  All of the patient's questions and concerns were addressed. Otezla Counseling: The side effects of Otezla were discussed with the patient, including but not limited to worsening or new depression, weight loss, diarrhea, nausea, upper respiratory tract infection, and headache. Patient instructed to call the office should any adverse effect occur.  The patient verbalized understanding of the proper use and possible adverse effects of Otezla.  All the patient's questions and concerns were addressed. Tranexamic Acid Pregnancy And Lactation Text: It is unknown if this medication is safe during pregnancy or breast feeding. Solaraze Pregnancy And Lactation Text: This medication is Pregnancy Category B and is considered safe. There is some data to suggest avoiding during the third trimester. It is unknown if this medication is excreted in breast milk. Erythromycin Pregnancy And Lactation Text: This medication is Pregnancy Category B and is considered safe during pregnancy. It is also excreted in breast milk. Xeldenisez Pregnancy And Lactation Text: This medication is Pregnancy Category D and is not considered safe during pregnancy.  The risk during breast feeding is also uncertain. Hydroquinone Counseling:  Patient advised that medication may result in skin irritation, lightening (hypopigmentation), dryness, and burning.  In the event of skin irritation, the patient was advised to reduce the amount of the drug applied or use it less frequently.  Rarely, spots that are treated with hydroquinone can become darker (pseudoochronosis).  Should this occur, patient instructed to stop medication and call the office. The patient verbalized understanding of the proper use and possible adverse effects of hydroquinone.  All of the patient's questions and concerns were addressed. Finasteride Counseling:  I discussed with the patient the risks of use of finasteride including but not limited to decreased libido, decreased ejaculate volume, gynecomastia, and depression. Women should not handle medication.  All of the patient's questions and concerns were addressed. Detail Level: Simple Terbinafine Counseling: Patient counseling regarding adverse effects of terbinafine including but not limited to headache, diarrhea, rash, upset stomach, liver function test abnormalities, itching, taste/smell disturbance, nausea, abdominal pain, and flatulence.  There is a rare possibility of liver failure that can occur when taking terbinafine.  The patient understands that a baseline LFT and kidney function test may be required. The patient verbalized understanding of the proper use and possible adverse effects of terbinafine.  All of the patient's questions and concerns were addressed. Valtrex Counseling: I discussed with the patient the risks of valacyclovir including but not limited to kidney damage, nausea, vomiting and severe allergy.  The patient understands that if the infection seems to be worsening or is not improving, they are to call. Topical Retinoid counseling:  Patient advised to apply a pea-sized amount only at bedtime and wait 30 minutes after washing their face before applying.  If too drying, patient may add a non-comedogenic moisturizer. The patient verbalized understanding of the proper use and possible adverse effects of retinoids.  All of the patient's questions and concerns were addressed. Prednisone Counseling:  I discussed with the patient the risks of prolonged use of prednisone including but not limited to weight gain, insomnia, osteoporosis, mood changes, diabetes, susceptibility to infection, glaucoma and high blood pressure.  In cases where prednisone use is prolonged, patients should be monitored with blood pressure checks, serum glucose levels and an eye exam.  Additionally, the patient may need to be placed on GI prophylaxis, PCP prophylaxis, and calcium and vitamin D supplementation and/or a bisphosphonate.  The patient verbalized understanding of the proper use and the possible adverse effects of prednisone.  All of the patient's questions and concerns were addressed. Finasteride Pregnancy And Lactation Text: This medication is absolutely contraindicated during pregnancy. It is unknown if it is excreted in breast milk. Otezla Pregnancy And Lactation Text: This medication is Pregnancy Category C and it isn't known if it is safe during pregnancy. It is unknown if it is excreted in breast milk. Xeljanz Counseling: I discussed with the patient the risks of Xeljanz therapy including increased risk of infection, liver issues, headache, diarrhea, or cold symptoms. Live vaccines should be avoided. They were instructed to call if they have any problems. Metronidazole Counseling:  I discussed with the patient the risks of metronidazole including but not limited to seizures, nausea/vomiting, a metallic taste in the mouth, nausea/vomiting and severe allergy. Ketoconazole Pregnancy And Lactation Text: This medication is Pregnancy Category C and it isn't know if it is safe during pregnancy. It is also excreted in breast milk and breast feeding isn't recommended. Valtrex Pregnancy And Lactation Text: this medication is Pregnancy Category B and is considered safe during pregnancy. This medication is not directly found in breast milk but it's metabolite acyclovir is present. Infliximab Counseling:  I discussed with the patient the risks of infliximab including but not limited to myelosuppression, immunosuppression, autoimmune hepatitis, demyelinating diseases, lymphoma, and serious infections.  The patient understands that monitoring is required including a PPD at baseline and must alert us or the primary physician if symptoms of infection or other concerning signs are noted. Imiquimod Counseling:  I discussed with the patient the risks of imiquimod including but not limited to erythema, scaling, itching, weeping, crusting, and pain.  Patient understands that the inflammatory response to imiquimod is variable from person to person and was educated regarded proper titration schedule.  If flu-like symptoms develop, patient knows to discontinue the medication and contact us. Oxybutynin Counseling:  I discussed with the patient the risks of oxybutynin including but not limited to skin rash, drowsiness, dry mouth, difficulty urinating, and blurred vision. Metronidazole Pregnancy And Lactation Text: This medication is Pregnancy Category B and considered safe during pregnancy.  It is also excreted in breast milk. Carac Counseling:  I discussed with the patient the risks of Carac including but not limited to erythema, scaling, itching, weeping, crusting, and pain.

## 2023-12-23 NOTE — PROGRESS NOTES
Hospital Medicine Progress Note      Date of Admission: 12/22/2023  Hospital Day: 2    Chief Admission Complaint: Confusion     Subjective: Patient remains confused and disoriented.  Alert to person.    Presenting Admission History:       83 y.o. male who presented to [facility] with confusion and hypercalcemia.  PMHx significant for new diagnosis of cancer with mets.  Patient presented to the emergency room for confusion according to the family, it seems he was diagnosed with the cancer with bladder mass and lung nodules concerning for metastatic disease, presented with some hematuria and was found to have hypercalcemia, patient is having some confusion although he is oriented to self and place but not to time.  It was hard to obtain clear information from the patient.  He denied any fever, chills, chest pain, shortness of breath but looked to be very dehydrated there was no family at bedside.  Labs showed potassium of 3.4, calcium of 15.5 with recent calcium from 11/28/2023 at 10.0.  Glucose was 152, white blood cells were elevated at 12.6 and his urine analysis showed large amount of blood with small leukocytosis, chest x-ray showed multiple pulmonary nodules worrisome for metastatic disease, CT scan head showed no acute finding.  He had recent CT scan abdomen on 12/4/2023 which showed multiple lung nodules bilateral up to 1.3 cm with recommendation for further testing including PET scan, also irregular wall thickening of the anterior bladder due to concern of a bladder mass.    Assessment/Plan:      Current Principal Problem:  Acute metabolic encephalopathy    1.  Acute metabolic encephalopathy possibly related to dehydration and hypercalcemia.  Will correct electrolyte abnormality.  Try to avoid sedation.  Patient may need sitter until his mental status improves.  2.  Hypercalcemia.  This may be related to malignancy.  Nephrology consulted.  Will continue hydration.  Patient will receive dose of Zometa.   Patient on calcitonin.  Monitor renal function.  3.  Possible UTI.  Patient was started on Rocephin.  Will continue empirically.  4.  Electrolyte abnormality with hypophosphatemia.  Will supplement.    Physical Exam Performed:      General appearance:  No apparent distress  Respiratory:  Normal respiratory effort.   Cardiovascular:  Regular rate and rhythm.  Abdomen:  Soft, non-tender, non-distended.  Musculoskeletal:  No edema  Neurologic:  Non-focal  Psychiatric: Alert to person.    BP (!) 165/66   Pulse 87   Temp 99.7 °F (37.6 °C) (Axillary)   Resp 18   Ht 1.778 m (5' 10\")   Wt 70.8 kg (156 lb)   SpO2 90%   BMI 22.38 kg/m²     Diet: ADULT DIET; Regular  DVT Prophylaxis: [x]PPx LMWH  []SQ Heparin  []IPC/SCDs  []Eliquis  []Xarelto  []Coumadin  []Other -      Code status: Full Code  PT/OT Eval Status:   []NOT yet ordered  [x]Ordered and Pending   []Seen with Recommendations for:  []Home independently  []Home w/ assist  []HHC  []SNF  []Acute Rehab    Anticipated Discharge Day/Date:  3-4 days    Anticipated Discharge Location: []Home  []HHC  [x]SNF  []Acute Rehab  []ECF  []LTAC  []Hospice  []Other -      Consults:      IP CONSULT TO NEPHROLOGY      This patient has a high likelihood of being discharged tomorrow and is appropriate for A1 Discharge Unit in AM pending clinical course overnight: []Yes  [x]No    ------------------------------------------------------------------------------------------------------------------------------------------------------------------------    MDM    Patient with 1 or more chronic illnesses with severe exacerbation or side effects of treatment and/or 1 acute or chronic illness that poses threat to life or bodily function.    Decision regarding hospitalization or escalation  Patient on drug therapy that requires intensive monitoring.      Medications:  Personally reviewed in detail in conjunction w/ labs as documented for evidence of drug toxicity.     Infusion Medications

## 2023-12-23 NOTE — ED PROVIDER NOTES
I independently examined and evaluated Eliezer Concepcion.  I personally saw the patient and performed a substantive portion of the visit including all aspects of the medical decision making.    In brief, this 83-year-old male is presenting with generalized fatigue, increased confusion and urinary frequency.  Has recent been diagnosed with metastatic cancer.  Currently denies any pain, nausea, shortness of breath..    Focused exam revealed   General: Alert, no acute distress, patient resting comfortably   Skin: warm, intact, no pallor noted   Head: Normocephalic, atraumatic   Eye: Normal conjunctiva   Cardiac: Normal peripheral perfusion  Respiratory: No acute distress   Musculoskeletal: No deformity, full ROM.   Neurological: alert and confused, normal sensory and motor observed.   Psychiatric: Cooperative    ED course: Initial lab work obtained and is concerning for hypercalcemia of 15.5.  Troponin mildly elevated at 24.  Urinalysis also concerning for possible UTI.  Started on ceftriaxone and given a liter of IV fluid.  Nephrology consulted regarding his hypercalcemia and they have ordered zoledronic acid and calcitonin.  Patient discussed with hospitalist for admission and was accepted.    Critical care    Critical care time 32 minutes exclusive from separate billable procedures that were performed. The following was considered in the determination of critical care but not limited to the level of medical decision making, intensive cardiac and/or respiratory monitoring, frequent vital sign monitoring, evaluation of laboratory studies, evaluation of radiographic studies, oxygen monitoring, and constant monitoring and speaking to family at bedside.    All diagnostic, treatment, and disposition decisions were made by myself in conjunction with the advanced practice provider/resident.    For all further details of the patient's emergency department visit, please see the advanced practice provider's/resident's

## 2023-12-23 NOTE — PLAN OF CARE
Problem: Safety - Adult  Goal: Free from fall injury  Outcome: Progressing  Flowsheets (Taken 12/23/2023 1835)  Free From Fall Injury:   Instruct family/caregiver on patient safety   Based on caregiver fall risk screen, instruct family/caregiver to ask for assistance with transferring infant if caregiver noted to have fall risk factors     Problem: ABCDS Injury Assessment  Goal: Absence of physical injury  Flowsheets (Taken 12/23/2023 1835)  Absence of Physical Injury: Implement safety measures based on patient assessment     Problem: Skin/Tissue Integrity  Goal: Absence of new skin breakdown  Description: 1.  Monitor for areas of redness and/or skin breakdown  2.  Assess vascular access sites hourly  3.  Every 4-6 hours minimum:  Change oxygen saturation probe site  4.  Every 4-6 hours:  If on nasal continuous positive airway pressure, respiratory therapy assess nares and determine need for appliance change or resting period.  Outcome: Progressing

## 2023-12-23 NOTE — PROGRESS NOTES
Patient is alert with confusion. He was oriented at 1900, however he became very confused by 2200. Avasure was initiated for safety. Patient has spent the night stating that he needs to leave and drive home, but when explained that he is in the hospital and is here for the night, yells \"I know\" at nursing staff. O2 was placed on pt at about 0000, d/t low O2sat. Pt denied pain. Pure wick in place at this time, pt however has been pulling at telemetry leads and purewick throughout the night. Call bell in reach. Video monitoring on for safety.

## 2023-12-23 NOTE — PROGRESS NOTES
Occupational Therapy  Facility/Department: [facility] C3 TELE/MED SURG/ONC  Occupational Therapy Initial Assessment    Name: Eliezer Concepcion  : 1940  MRN: [id number]  Date of Service: 2023    Discharge Recommendations:  Subacute/Skilled Nursing Facility  Therapy discharge recommendations take into account each patient's current medical complexities and are subject to input/oversight from the patient's healthcare team.   Barriers to Home Discharge:      [x] Unable to transfer, ambulate, or propel wheelchair household distances without assist   [x] Limited available assist at home upon discharge       If pt is unable to be seen after this session, please let this note serve as discharge summary.  Please see case management note for discharge disposition.  Thank you.           Patient Diagnosis(es): The primary encounter diagnosis was Acute cystitis with hematuria. A diagnosis of Hypercalcemia was also pertinent to this visit.  Past Medical History:  has a past medical history of Actinic keratoses, Basal cell carcinoma nos, COVID-19, Renal calculi, and Skull fracture (HCC).  Past Surgical History:  has a past surgical history that includes malignant skin lesion excision; hernia repair; and Umbilical hernia repair.           Assessment   Performance deficits / Impairments: Decreased functional mobility ;Decreased ADL status;Decreased balance;Decreased safe awareness;Decreased coordination;Decreased cognition  Assessment: pt admitted from home per chart review with diagnosis of acute metabolic encephalopathy; now requiring dependent of 2 bed mobility, max assist for static sitting balance EOB, dependent with UE self care; pt to benefit from skilled OT services;  REQUIRES OT FOLLOW-UP: Yes  Activity Tolerance  Activity Tolerance: Treatment limited secondary to decreased cognition        Plan   Occupational Therapy Plan  Times Per Week: 2-4x/ week  Current Treatment Recommendations: Balance training, Functional  cognition  Activity Tolerance Comments: pt unable to follow commands and fully participate in PT initial evaluation.    Bed mobility  Rolling to Left: Dependent/Total  Rolling to Right: Dependent/Total  Supine to Sit: Dependent/Total (of 2 to Left)  Sit to Supine: Dependent/Total (dependent of 2)  Scooting: Dependent/Total (of 2 in supine to HOB)  Bed Mobility Comments: max assist to maintain static sitting balance EOB ~ 2 minutes  Transfers  Sit to stand: Unable to assess (d/t poor/absent sitting balance, confusion, poor command following;)    Vision  Vision:  (CHANG)    Hearing  Hearing:  (CHANG formally, pt able to hear and respond to some questions.)    Cognition  Overall Cognitive Status: Exceptions (lethargic, confused, restless at times)  Arousal/Alertness: Inconsistent responses to stimuli  Following Commands: Does not follow commands  Attention Span: Unable to maintain attention  Memory: Decreased recall of recent events;Decreased long term memory  Safety Judgement: Decreased awareness of need for safety;Decreased awareness of need for assistance  Insights: Not aware of deficits  Initiation: Requires cues for all  Sequencing: Requires cues for all  Orientation  Overall Orientation Status: Impaired  Orientation Level: Oriented to person;Disoriented to time;Disoriented to situation;Disoriented to place                  Education Given To: Patient  Education Provided: Role of Therapy;Plan of Care;Precautions  Education Provided Comments: disease specific: importance of use of RED/nurse call light for assist with ADL needs, positioning  Education Method: Verbal;Demonstration  Barriers to Learning: Cognition  Education Outcome: Unable to verbalize;Unable to demonstrate understanding;Continued education needed                AM-PAC - ADL  AM-PAC Daily Activity - Inpatient   How much help is needed for putting on and taking off regular lower body clothing?: Total  How much help is needed for bathing (which includes

## 2023-12-23 NOTE — PROGRESS NOTES
Perfect serve sent to Dr. Rowe regarding pt deterioration index now 57 and flagging. It lookes as though pt has been 50 for the DI since this morning. Pt RR 20 and HR 93,  which is down from before. Pt is lethargic this morning. Pt will wake up and stat his name and birthday but falls right back to sleep.     Message read at 1027. No new order placed.

## 2023-12-23 NOTE — PROGRESS NOTES
Nephrology Progress Note   [employer]      Sub/interval history  Resting  Confused  Ca 12.2 from 15    Last 24 h uop 2.2l    ROS: No chest pain/shortness of breath/fever/nausea/vomiting  PSFH: No visitor    Scheduled Meds:   cefTRIAXone (ROCEPHIN) IV  1,000 mg IntraVENous Q24H    sodium chloride flush  5-40 mL IntraVENous 2 times per day    enoxaparin  40 mg SubCUTAneous Daily     Continuous Infusions:   sodium chloride 100 mL/hr at 12/22/23 1745    sodium chloride       PRN Meds:.sodium chloride flush, sodium chloride, potassium chloride **OR** potassium alternative oral replacement **OR** potassium chloride, magnesium sulfate, ondansetron **OR** ondansetron, polyethylene glycol, acetaminophen **OR** acetaminophen    Objective/     Vitals:    12/22/23 2009 12/22/23 2325 12/23/23 0409 12/23/23 0734   BP: (!) 142/72 139/75 (!) 163/81    Pulse: 81 94 91    Resp: 18 18 18    Temp: 97.9 °F (36.6 °C) 97.8 °F (36.6 °C) 98.4 °F (36.9 °C)    TempSrc: Oral Oral Axillary    SpO2: 92% 92% 93% 93%   Weight:       Height:         24HR INTAKE/OUTPUT:    Intake/Output Summary (Last 24 hours) at 12/23/2023 0915  Last data filed at 12/23/2023 0527  Gross per 24 hour   Intake 10 ml   Output 2200 ml   Net -2190 ml     Gen: alert, awake  Neck: No JVD  Skin: Unremarkable  Cardiovascular:  S1, S2 without m/r/g   Respiratory: CTA B without w/r/r; respiratory effort normal  Abdomen:  soft, nt, nd,   Extremities: no lower extremity edema  Neuro/Psy: AAoriented times 3 ; moves all 4 ext    Data/  Recent Labs     12/22/23  1200 12/23/23  0635   WBC 12.6* 15.4*   HGB 13.0* 13.7   HCT 39.5* 41.6   MCV 87.0 86.0    425     Recent Labs     12/22/23  1200 12/23/23  0635    142   K 3.4* 3.5   CL 99 105   CO2 29 25   GLUCOSE 152* 117*   PHOS  --  1.3*   MG 2.00  --    BUN 22* 17   CREATININE 1.3 1.1   LABGLOM 54* >60       Assessment/     - Hypercalcemia of malignancy +/- pre-renal component though degree of elevation not c/w  dehydration alone   S/p zometa      - Lactic acidosis    -HTN     -recent imaging this month revealing suspected bladder/liver mass, lung nodules      Plan/   - amlodipine 5 mg daily   - IVF's with NS & calcitonin  - Trend labs, bp's, weights, & urine output for hopeful improvement    Natasha Hoffman MD  Office: [phone]  Fax:    [phone]  [facility]

## 2023-12-23 NOTE — PROGRESS NOTES
Perfect serve sent to Dr. Rowe \"Pt serum calcium this morning is critical at 12.1.     Message back from MD: \"okay, will review\"

## 2023-12-23 NOTE — PROGRESS NOTES
Perfect serve sent to Dr. Rowe regarding pt deterioration index of 57 flagging. HR 93. RR 20 and SBP decreased to 136 form earlier. Pt lethargic this morning. Pt will wake up to state his name and birthday but falls right back to sleep. Per night shift RN pt did not sleep last night.

## 2023-12-23 NOTE — PLAN OF CARE
Problem: Safety - Adult  Goal: Free from fall injury  Outcome: Progressing  Flowsheets (Taken 12/23/2023 0314)  Free From Fall Injury:   Instruct family/caregiver on patient safety   Based on caregiver fall risk screen, instruct family/caregiver to ask for assistance with transferring infant if caregiver noted to have fall risk factors     Problem: ABCDS Injury Assessment  Goal: Absence of physical injury  Outcome: Progressing  Flowsheets (Taken 12/23/2023 0314)  Absence of Physical Injury: Implement safety measures based on patient assessment     Problem: Pain  Goal: Verbalizes/displays adequate comfort level or baseline comfort level  Outcome: Progressing  Flowsheets (Taken 12/23/2023 0314)  Verbalizes/displays adequate comfort level or baseline comfort level:   Encourage patient to monitor pain and request assistance   Assess pain using appropriate pain scale   Administer analgesics based on type and severity of pain and evaluate response   Consider cultural and social influences on pain and pain management   Implement non-pharmacological measures as appropriate and evaluate response   Notify Licensed Independent Practitioner if interventions unsuccessful or patient reports new pain

## 2023-12-23 NOTE — PROGRESS NOTES
Physical Therapy  Facility/Department: [facility] C3 TELE/MED SURG/ONC  Physical Therapy Initial Assessment    Name: Eliezer Concepcion  : 1940  MRN: [id number]  Date of Service: 2023    Discharge Recommendations:  Subacute/Skilled Nursing Facility   PT Equipment Recommendations  Equipment Needed: No  Other: defer to next level of care.      Therapy discharge recommendations take into account each patient's current medical complexities and are subject to input/oversight from the patient's healthcare team.   Barriers to Home Discharge:   [] Steps to access home entry or bed/bath:   [x] Unable to transfer, ambulate, or propel wheelchair household distances without assist   [x] Unable to perform ADLs without assist   [] Limited available assist at home upon discharge    [] Patient or family requests d/c to post-acute facility    [x] Poor cognition/safety awareness for d/c to home alone    [] Unable to maintain ordered weight bearing status    [] Patient with salient signs of long-standing immobility   [] Other: pt may have steps to enter home, unknown at this time, unknown how much assist is available at home at this time.    Patient Diagnosis(es): The primary encounter diagnosis was Acute cystitis with hematuria. A diagnosis of Hypercalcemia was also pertinent to this visit.  Past Medical History:  has a past medical history of Actinic keratoses, Basal cell carcinoma nos, COVID-19, Renal calculi, and Skull fracture (HCC).  Past Surgical History:  has a past surgical history that includes malignant skin lesion excision; hernia repair; and Umbilical hernia repair.    Assessment   Body Structures, Functions, Activity Limitations Requiring Skilled Therapeutic Intervention: Decreased functional mobility ;Decreased ADL status;Decreased body mechanics;Decreased ROM;Decreased strength;Decreased safe awareness;Decreased cognition;Decreased endurance;Decreased balance;Decreased high-level IADLs;Decreased

## 2023-12-24 LAB
ALBUMIN SERPL-MCNC: 2.7 G/DL (ref 3.4–5)
ANION GAP SERPL CALCULATED.3IONS-SCNC: 11 MMOL/L (ref 3–16)
BUN SERPL-MCNC: 16 MG/DL (ref 7–20)
CALCIUM SERPL-MCNC: 10.3 MG/DL (ref 8.3–10.6)
CHLORIDE SERPL-SCNC: 106 MMOL/L (ref 99–110)
CO2 SERPL-SCNC: 22 MMOL/L (ref 21–32)
CREAT SERPL-MCNC: 0.9 MG/DL (ref 0.8–1.3)
FOLATE SERPL-MCNC: 5.62 NG/ML (ref 4.78–24.2)
GFR SERPLBLD CREATININE-BSD FMLA CKD-EPI: >60 ML/MIN/{1.73_M2}
GLUCOSE SERPL-MCNC: 107 MG/DL (ref 70–99)
PHOSPHATE SERPL-MCNC: 1.5 MG/DL (ref 2.5–4.9)
POTASSIUM SERPL-SCNC: 3.1 MMOL/L (ref 3.5–5.1)
SODIUM SERPL-SCNC: 139 MMOL/L (ref 136–145)
TSH SERPL DL<=0.005 MIU/L-ACNC: 1.52 UIU/ML (ref 0.27–4.2)
VIT B12 SERPL-MCNC: 1965 PG/ML (ref 211–911)

## 2023-12-24 PROCEDURE — 36415 COLL VENOUS BLD VENIPUNCTURE: CPT

## 2023-12-24 PROCEDURE — 6370000000 HC RX 637 (ALT 250 FOR IP): Performed by: INTERNAL MEDICINE

## 2023-12-24 PROCEDURE — 1200000000 HC SEMI PRIVATE

## 2023-12-24 PROCEDURE — 2700000000 HC OXYGEN THERAPY PER DAY

## 2023-12-24 PROCEDURE — 2580000003 HC RX 258: Performed by: INTERNAL MEDICINE

## 2023-12-24 PROCEDURE — 80069 RENAL FUNCTION PANEL: CPT

## 2023-12-24 PROCEDURE — 94761 N-INVAS EAR/PLS OXIMETRY MLT: CPT

## 2023-12-24 PROCEDURE — 2500000003 HC RX 250 WO HCPCS: Performed by: INTERNAL MEDICINE

## 2023-12-24 PROCEDURE — 6360000002 HC RX W HCPCS: Performed by: INTERNAL MEDICINE

## 2023-12-24 RX ORDER — POTASSIUM CHLORIDE 20 MEQ/1
40 TABLET, EXTENDED RELEASE ORAL ONCE
Status: DISCONTINUED | OUTPATIENT
Start: 2023-12-24 | End: 2024-01-01 | Stop reason: HOSPADM

## 2023-12-24 RX ADMIN — CEFTRIAXONE SODIUM 1000 MG: 1 INJECTION, POWDER, FOR SOLUTION INTRAMUSCULAR; INTRAVENOUS at 09:04

## 2023-12-24 RX ADMIN — AMLODIPINE BESYLATE 5 MG: 5 TABLET ORAL at 09:04

## 2023-12-24 RX ADMIN — Medication 5 MG: at 20:19

## 2023-12-24 RX ADMIN — POTASSIUM PHOSPHATE, MONOBASIC AND POTASSIUM PHOSPHATE, DIBASIC 30 MMOL: 224; 236 INJECTION, SOLUTION, CONCENTRATE INTRAVENOUS at 10:27

## 2023-12-24 RX ADMIN — SODIUM CHLORIDE, PRESERVATIVE FREE 10 ML: 5 INJECTION INTRAVENOUS at 20:19

## 2023-12-24 RX ADMIN — ENOXAPARIN SODIUM 40 MG: 100 INJECTION SUBCUTANEOUS at 09:04

## 2023-12-24 RX ADMIN — POTASSIUM CHLORIDE 40 MEQ: 1500 TABLET, EXTENDED RELEASE ORAL at 06:38

## 2023-12-24 ASSESSMENT — PAIN SCALES - GENERAL
PAINLEVEL_OUTOF10: 0
PAINLEVEL_OUTOF10: 0

## 2023-12-24 NOTE — PROGRESS NOTES
Pt is alert and oriented with intermittent confusion. Pt was able to state name, place, reason for being in hospital. Pt stated it was 1925 and needed help with his birthday. Pt more awake today then yesterday. Pt was able to eat breakfast with assistance from sitter. WILIAN garza sitter at bedside for safety. Pt not pulling at gown, tele or lines this morning. Shift assessment completed and documented. Call light within reach. Bed side table within reach. Wheels locked. Bed in lowest position. Bed check in place. Pt instructed to call out for assistance. Pt expressesed understanding & calls out appropritately. All care per orders. Electronically signed by Mery Ortiz RN on 12/24/2023 at 9:42 AM

## 2023-12-24 NOTE — PROGRESS NOTES
Perfect serve sent to Dr. Rowe \"Pt still confused. Pt has tried to get out of bed several times this afternoon. Pt pulling at tele monitor and gown off. Would it be possible to get something ordered PRN for agitation? Thanks!     New order placed for bedside sitter.

## 2023-12-24 NOTE — CONSULTS
Consult Placed   Consult sent via perfect serve  Who: Dr. Garcia  Date:12/24/2023    Time:2:01PM     Electronically signed by Grecia Pickens on 12/24/2023 at 2:01 PM

## 2023-12-24 NOTE — CONSULTS
[facility]    Consult Note      Requesting Physician:      CHIEF COMPLAINT:  Lung mets      HISTORY OF PRESENT ILLNESS:    Mr. Concepcion  is a 83 y.o. male we are seeing in consultation for     ICD-10-CM    1. Acute cystitis with hematuria  N30.01       2. Hypercalcemia  E83.52          This patient was admitted to [facility] on 12/22/2023 due to acute met encephalopathy.  Ca 15.5 mg/dL on admission.    He was given Miacalcin x2 and then Zoledronic acid x1 on 12/22/2023.    Ca is 10.3 mg/dL.    Past Medical History:  Past Medical History:   Diagnosis Date    Actinic keratoses     Basal cell carcinoma nos     COVID-19 01/20/2021    Renal calculi     Skull fracture (HCC)     as a child       Past Surgical History:  Past Surgical History:   Procedure Laterality Date    HERNIA REPAIR      right and left    MALIGNANT SKIN LESION EXCISION      UMBILICAL HERNIA REPAIR         Current Medications:  Current Facility-Administered Medications   Medication Dose Route Frequency Provider Last Rate Last Admin    potassium chloride (KLOR-CON M) extended release tablet 40 mEq  40 mEq Oral Once Isaiah Zuniga DO        potassium phosphate 30 mmol in sodium chloride 0.9 % 500 mL IVPB  30 mmol IntraVENous Once Natasha Hoffman  mL/hr at 12/24/23 1027 30 mmol at 12/24/23 1027    amLODIPine (NORVASC) tablet 5 mg  5 mg Oral Daily aNtasha Hoffman MD   5 mg at 12/24/23 0904    melatonin disintegrating tablet 5 mg  5 mg Oral Nightly SetserJeronimo MD   5 mg at 12/23/23 2024    cefTRIAXone (ROCEPHIN) 1,000 mg in sodium chloride 0.9 % 50 mL IVPB (mini-bag)  1,000 mg IntraVENous Q24H Sandhya Paul MD   Stopped at 12/24/23 0934    sodium chloride flush 0.9 % injection 5-40 mL  5-40 mL IntraVENous 2 times per day Sandhya Paul MD        sodium chloride flush 0.9 % injection 5-40 mL  5-40 mL  Frequency    FINDINGS:  Single-view chest demonstrates multiple nodules throughout the lungs with  some consolidation in the bases.  The heart is normal in size.  The trachea  is midline hilar symmetrical.  Impression: Multiple pulmonary nodules worrisome for metastatic disease.  Less likely  this represents infiltrate or granulomatous disease.  Recommend further  evaluation.  This is new compared to the prior study.      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  - CTPA, 12/22/2023, showed innumerable lung mets.  - Would think we could just get a CT-guided biopsy of one of the pulmonary nodules early next week.    2.) Hypercalcemia of malignancy   - Ca 15.5 mg/dL on admission on 12/22/2023.  - Received Miacalcin x2 and Zoledronic acid x1 on admission.  - Ca 10.3 mg/dL today.    3.) UTI  - Ceftriaxone    Thank you for asking me to see the patient.       Eliezer Mcgarry MD  Please Contact Through Perfect Serve

## 2023-12-24 NOTE — CONSULTS
Consult Placed     Who: Dr. Frankel  Date:12/24/2023    Time:09:53AM     Electronically signed by Grecia Pickens on 12/24/2023 at 9:52 AM

## 2023-12-24 NOTE — PLAN OF CARE
Problem: Safety - Adult  Goal: Free from fall injury  12/24/2023 0348 by YAZMIN WEST  Outcome: Progressing  Flowsheets (Taken 12/24/2023 0348)  Free From Fall Injury:   Based on caregiver fall risk screen, instruct family/caregiver to ask for assistance with transferring infant if caregiver noted to have fall risk factors   Instruct family/caregiver on patient safety  12/23/2023 1835 by Mery Ortiz, RN  Outcome: Progressing  Flowsheets (Taken 12/23/2023 1835)  Free From Fall Injury:   Instruct family/caregiver on patient safety   Based on caregiver fall risk screen, instruct family/caregiver to ask for assistance with transferring infant if caregiver noted to have fall risk factors     Problem: ABCDS Injury Assessment  Goal: Absence of physical injury  12/24/2023 0348 by YAZMIN WEST  Outcome: Progressing  Flowsheets (Taken 12/24/2023 0348)  Absence of Physical Injury: Implement safety measures based on patient assessment  12/23/2023 1835 by Mery Ortiz RN  Flowsheets (Taken 12/23/2023 1835)  Absence of Physical Injury: Implement safety measures based on patient assessment     Problem: Pain  Goal: Verbalizes/displays adequate comfort level or baseline comfort level  12/24/2023 0348 by YAZMIN WEST  Outcome: Progressing  Flowsheets (Taken 12/24/2023 0348)  Verbalizes/displays adequate comfort level or baseline comfort level:   Consider cultural and social influences on pain and pain management   Administer analgesics based on type and severity of pain and evaluate response   Encourage patient to monitor pain and request assistance   Assess pain using appropriate pain scale   Implement non-pharmacological measures as appropriate and evaluate response   Notify Licensed Independent Practitioner if interventions unsuccessful or patient reports new pain  12/23/2023 1835 by Mery Ortiz, RN  Outcome: Progressing  Flowsheets (Taken 12/23/2023 1835)  Verbalizes/displays adequate comfort level or

## 2023-12-24 NOTE — PROGRESS NOTES
Nephrology Progress Note   [place] [email or web address]      Sub/interval history  Resting  Confused, sitter at the bedside  Ca 10.3    Last 24 h uop 2.5l    ROS: Unable to obtain  PSFH: No visitor    Scheduled Meds:   potassium chloride  40 mEq Oral Once    potassium phosphate IVPB (PERIPHERAL LINE)  30 mmol IntraVENous Once    amLODIPine  5 mg Oral Daily    melatonin  5 mg Oral Nightly    cefTRIAXone (ROCEPHIN) IV  1,000 mg IntraVENous Q24H    sodium chloride flush  5-40 mL IntraVENous 2 times per day    enoxaparin  40 mg SubCUTAneous Daily     Continuous Infusions:   sodium chloride Stopped (12/23/23 1805)     PRN Meds:.sodium chloride flush, sodium chloride, magnesium sulfate, ondansetron **OR** ondansetron, polyethylene glycol, acetaminophen **OR** acetaminophen    Objective/     Vitals:    12/23/23 2015 12/23/23 2345 12/24/23 0300 12/24/23 0901   BP: (!) 144/63 (!) 150/68 131/63 120/63   Pulse: 78 80 74 77   Resp: 18 18 16 16   Temp: 98.1 °F (36.7 °C) 98.6 °F (37 °C) 97.5 °F (36.4 °C) 97.8 °F (36.6 °C)   TempSrc: Oral Oral Axillary Oral   SpO2: 91% 90% 90% 92%   Weight:       Height:         24HR INTAKE/OUTPUT:    Intake/Output Summary (Last 24 hours) at 12/24/2023 1140  Last data filed at 12/24/2023 0904  Gross per 24 hour   Intake 2888.13 ml   Output 2500 ml   Net 388.13 ml       Gen: alert, awake  Neck: No JVD  Skin: Unremarkable  Cardiovascular:  S1, S2 without m/r/g   Respiratory: CTA B without w/r/r; respiratory effort normal  Abdomen:  soft, nt, nd,   Extremities: no lower extremity edema  Neuro/Psy: AAoriented times 3 ; moves all 4 ext    Data/  Recent Labs     12/22/23  1200 12/23/23  0635   WBC 12.6* 15.4*   HGB 13.0* 13.7   HCT 39.5* 41.6   MCV 87.0 86.0    425       Recent Labs     12/22/23  1200 12/23/23  0635 12/24/23  0510    142 139   K 3.4* 3.5 3.1*   CL 99 105 106   CO2 29 25 22   GLUCOSE 152* 117* 107*   PHOS  --  1.3* 1.5*   MG 2.00  --   --    BUN 22* 17 16   CREATININE 1.3 1.1 0.9

## 2023-12-24 NOTE — PROGRESS NOTES
V2.0    [facility] [place]      Name:  Eliezer Concepcion /Age/Sex: 1940  (83 y.o. male)   MRN & CSN:  [id number] & [id number] Encounter Date/Time: 2023 7:37 AM EST   Location:  [id number]- PCP: Melquiades Leon MD     Attending:Jeronimo Rowe MD       Hospital Day: 3    Assessment and Recommendations   Eliezer Concepcion is a 83 y.o. male with PMH of new diagnosis of cancer with bladder mass and lung nodules concerning for metastatic disease who presents with Acute metabolic encephalopathy    Plan:     Acute metabolic encephalopathy  - CT head showed no acute abnormalities  - Likely secondary to dehydration, hypercalcemia, electrolyte abnormalities  - Continue to monitor CMP and replete accordingly    Hypercalcemia (improving)  Lung nodules, bladder mass, concern for metastatic disease  - Ca 10.3 on , down from 15.5 on   - Likely secondary to malignancy  - Received calcitonin and Zometa  - Continue to monitor renal function  - CT abd/pelvis : Innumerable pulmonary nodules throughout the bilateral portions of the  visualized lungs measuring up to 1.3 cm in size is highly concerning for metastatic disease. Asymmetric and irregular wall thickening of the anterior bladder is incompletely evaluated on this noncontrast exam. This could represent a bladder mass.  - Patient following Dr. Garcia outpatient and urologist at [facility]. PET scan was scheduled  - Nephrology following   - Agree with plan   - Continue to monitor labs, weights, urine output  - Urology consulted  - Oncology consulted    Possible UTI  - Small leukocyte esterase on UA  - Continue IV ceftriaxone    Hypophosphatemia  - 1.3 on   - Continue to monitor CMP    Hypokalemia  - K 3.1 on   - Ordered 40 mEq potassium chloride  - Continue to monitor CMP    HTN  - Continue amlodipine    Diet ADULT DIET; Regular   DVT Prophylaxis [x] Lovenox, []  Heparin, [] SCDs, [] Ambulation,  [] Eliquis, [] Xarelto  [] Coumadin   Code

## 2023-12-24 NOTE — PLAN OF CARE
Problem: Safety - Adult  Goal: Free from fall injury  12/24/2023 0734 by Mery Ortiz, RN  Outcome: Progressing  Flowsheets (Taken 12/24/2023 0734)  Free From Fall Injury:   Instruct family/caregiver on patient safety   Based on caregiver fall risk screen, instruct family/caregiver to ask for assistance with transferring infant if caregiver noted to have fall risk factors     Problem: Pain  Goal: Verbalizes/displays adequate comfort level or baseline comfort level  12/24/2023 0734 by Mery Ortiz, RN  Outcome: Progressing  Flowsheets (Taken 12/24/2023 0734)  Verbalizes/displays adequate comfort level or baseline comfort level:   Encourage patient to monitor pain and request assistance   Assess pain using appropriate pain scale   Administer analgesics based on type and severity of pain and evaluate response   Implement non-pharmacological measures as appropriate and evaluate response     Problem: Skin/Tissue Integrity  Goal: Absence of new skin breakdown  Description: 1.  Monitor for areas of redness and/or skin breakdown  2.  Assess vascular access sites hourly  3.  Every 4-6 hours minimum:  Change oxygen saturation probe site  4.  Every 4-6 hours:  If on nasal continuous positive airway pressure, respiratory therapy assess nares and determine need for appliance change or resting period.  12/24/2023 0734 by Mery Ortiz, RN  Outcome: Progressing

## 2023-12-24 NOTE — CONSULTS
Consult Placed     Who: Dr. Mcgarry  Date:12/24/2023    Time:1:15PM     Electronically signed by Grecia Pickens on 12/24/2023 at 1:15 PM

## 2023-12-24 NOTE — CONSULTS
Patient:  Eliezer Concepcion  YOB: 1940   CSN:  [id number]    Referring Provider:  No ref. provider found   Primary Care Provider:  Melquiades Leon MD       Urology Attending Consult Note     Reason for Consultation:  bladder mass    Chief Complaint: \"unable to voice complaint\"  Hist from nursing, emr etc    HPI:  Eliezer is a 83 y.o. male who presented to our urology office on dec 15th, gross hematuria that started about 1 month ago, urine culture negative. CT abdomen/pelvis with numerous lung nodules, asymmetrical bladder wall thickening and probable bladder mass and at least a 2cm bladder stone. He is having an intermittent urinary stream and urinary frequency. He did see pulmonology as outpatient as well and they planned on arranging a lung biopsy and he was supposed to get a PET scan but the machine was broken down and has been rescheduled for 12/29. Pulmonary is working on getting this moved up.   Now admitted for confusion and high Calcium.  Urine light pink at times. Denies pelvic bladder pain.     Ct reviewed as below    Past Medical History:   Diagnosis Date    Actinic keratoses     Basal cell carcinoma nos     COVID-19 01/20/2021    Renal calculi     Skull fracture (HCC)     as a child       Past Surgical History:   Procedure Laterality Date    HERNIA REPAIR      right and left    MALIGNANT SKIN LESION EXCISION      UMBILICAL HERNIA REPAIR         Medication List reviewed:      Current Facility-Administered Medications   Medication Dose Route Frequency Provider Last Rate Last Admin    potassium chloride (KLOR-CON M) extended release tablet 40 mEq  40 mEq Oral Once Isaiah Zuniga DO        amLODIPine (NORVASC) tablet 5 mg  5 mg Oral Daily Natasha Hoffman MD   5 mg at 12/24/23 0904    melatonin disintegrating tablet 5 mg  5 mg Oral Nightly Jeronimo Rowe MD   5 mg at 12/23/23 2024    cefTRIAXone (ROCEPHIN) 1,000 mg in sodium chloride 0.9 % 50 mL IVPB (mini-bag)  1,000 mg IntraVENous Q24H  >60 02/01/2021    AGRATIO 1.0 (L) 12/22/2023    GLOB 3.0 02/01/2021     Lab Results   Component Value Date    CREATININE 0.9 12/24/2023    CREATININE 1.1 12/23/2023    CREATININE 1.3 12/22/2023       Lab Results   Component Value Date/Time    COLORU Yellow 12/22/2023 12:10 PM    NITRU Negative 12/22/2023 12:10 PM    GLUCOSEU Negative 12/22/2023 12:10 PM    GLUCOSEU NEGATIVE 02/03/2011 10:49 AM    KETUA Negative 12/22/2023 12:10 PM    UROBILINOGEN 0.2 12/22/2023 12:10 PM    BILIRUBINUR Negative 12/22/2023 12:10 PM    BILIRUBINUR small 11/28/2023 10:00 AM    BILIRUBINUR NEGATIVE 02/03/2011 10:49 AM       Radiology: \"Imaging was independently reviewed by myself and I agree with the radiology interpretation”  Ct abd/pelvis with contrast this admission:    no hydronephrosis;   Prostate large with median bar (100grm or so)  Bladder abnormal, probable dome mass and mass near floor and stone vs necrotic calcium in tumor.       Impression:  Neoplasm uncertain behavior bladder, lung nodules  Hypercalcemia  Confusion  Bladder stone    Plan:  -cont to monitor urine output  -appreciate medicine, oncology, pulm recs  -can consider cysto later this week vs planned lung bx to get tissue diagnosis    Will follow    Alejandro Nix MD  Office: [phone] or [phone]

## 2023-12-25 LAB
ALBUMIN SERPL-MCNC: 3.1 G/DL (ref 3.4–5)
ANION GAP SERPL CALCULATED.3IONS-SCNC: 11 MMOL/L (ref 3–16)
BASOPHILS # BLD: 0.1 K/UL (ref 0–0.2)
BASOPHILS NFR BLD: 0.4 %
BUN SERPL-MCNC: 15 MG/DL (ref 7–20)
CALCIUM SERPL-MCNC: 9.9 MG/DL (ref 8.3–10.6)
CHLORIDE SERPL-SCNC: 105 MMOL/L (ref 99–110)
CO2 SERPL-SCNC: 22 MMOL/L (ref 21–32)
CREAT SERPL-MCNC: 0.9 MG/DL (ref 0.8–1.3)
DEPRECATED RDW RBC AUTO: 13.1 % (ref 12.4–15.4)
EOSINOPHIL # BLD: 0.1 K/UL (ref 0–0.6)
EOSINOPHIL NFR BLD: 0.5 %
GFR SERPLBLD CREATININE-BSD FMLA CKD-EPI: >60 ML/MIN/{1.73_M2}
GLUCOSE SERPL-MCNC: 107 MG/DL (ref 70–99)
HCT VFR BLD AUTO: 39.6 % (ref 40.5–52.5)
HGB BLD-MCNC: 13.2 G/DL (ref 13.5–17.5)
LYMPHOCYTES # BLD: 1 K/UL (ref 1–5.1)
LYMPHOCYTES NFR BLD: 6.7 %
MCH RBC QN AUTO: 28.6 PG (ref 26–34)
MCHC RBC AUTO-ENTMCNC: 33.3 G/DL (ref 31–36)
MCV RBC AUTO: 85.7 FL (ref 80–100)
MONOCYTES # BLD: 1.2 K/UL (ref 0–1.3)
MONOCYTES NFR BLD: 8.1 %
NEUTROPHILS # BLD: 12.1 K/UL (ref 1.7–7.7)
NEUTROPHILS NFR BLD: 84.3 %
PHOSPHATE SERPL-MCNC: 1.4 MG/DL (ref 2.5–4.9)
PHOSPHATE SERPL-MCNC: 2.3 MG/DL (ref 2.5–4.9)
PLATELET # BLD AUTO: 378 K/UL (ref 135–450)
PMV BLD AUTO: 7 FL (ref 5–10.5)
POTASSIUM SERPL-SCNC: 3.7 MMOL/L (ref 3.5–5.1)
RBC # BLD AUTO: 4.61 M/UL (ref 4.2–5.9)
SODIUM SERPL-SCNC: 138 MMOL/L (ref 136–145)
WBC # BLD AUTO: 14.3 K/UL (ref 4–11)

## 2023-12-25 PROCEDURE — 6360000002 HC RX W HCPCS: Performed by: NURSE PRACTITIONER

## 2023-12-25 PROCEDURE — 84100 ASSAY OF PHOSPHORUS: CPT

## 2023-12-25 PROCEDURE — 2580000003 HC RX 258: Performed by: INTERNAL MEDICINE

## 2023-12-25 PROCEDURE — 2700000000 HC OXYGEN THERAPY PER DAY

## 2023-12-25 PROCEDURE — 80069 RENAL FUNCTION PANEL: CPT

## 2023-12-25 PROCEDURE — 1200000000 HC SEMI PRIVATE

## 2023-12-25 PROCEDURE — 99232 SBSQ HOSP IP/OBS MODERATE 35: CPT | Performed by: INTERNAL MEDICINE

## 2023-12-25 PROCEDURE — 6370000000 HC RX 637 (ALT 250 FOR IP): Performed by: INTERNAL MEDICINE

## 2023-12-25 PROCEDURE — 2500000003 HC RX 250 WO HCPCS: Performed by: INTERNAL MEDICINE

## 2023-12-25 PROCEDURE — 94761 N-INVAS EAR/PLS OXIMETRY MLT: CPT

## 2023-12-25 PROCEDURE — 85025 COMPLETE CBC W/AUTO DIFF WBC: CPT

## 2023-12-25 PROCEDURE — 36415 COLL VENOUS BLD VENIPUNCTURE: CPT

## 2023-12-25 PROCEDURE — 6360000002 HC RX W HCPCS: Performed by: INTERNAL MEDICINE

## 2023-12-25 RX ORDER — OLANZAPINE 10 MG/2ML
2.5 INJECTION, POWDER, FOR SOLUTION INTRAMUSCULAR ONCE
Status: COMPLETED | OUTPATIENT
Start: 2023-12-25 | End: 2023-12-25

## 2023-12-25 RX ORDER — TRAZODONE HYDROCHLORIDE 50 MG/1
25 TABLET ORAL NIGHTLY
Status: DISCONTINUED | OUTPATIENT
Start: 2023-12-25 | End: 2024-01-01 | Stop reason: HOSPADM

## 2023-12-25 RX ADMIN — SODIUM CHLORIDE, PRESERVATIVE FREE 10 ML: 5 INJECTION INTRAVENOUS at 07:42

## 2023-12-25 RX ADMIN — SODIUM CHLORIDE, PRESERVATIVE FREE 10 ML: 5 INJECTION INTRAVENOUS at 20:24

## 2023-12-25 RX ADMIN — CEFTRIAXONE SODIUM 1000 MG: 1 INJECTION, POWDER, FOR SOLUTION INTRAMUSCULAR; INTRAVENOUS at 07:52

## 2023-12-25 RX ADMIN — OLANZAPINE 2.5 MG: 10 INJECTION, POWDER, FOR SOLUTION INTRAMUSCULAR at 04:34

## 2023-12-25 RX ADMIN — ENOXAPARIN SODIUM 40 MG: 100 INJECTION SUBCUTANEOUS at 07:42

## 2023-12-25 RX ADMIN — AMLODIPINE BESYLATE 5 MG: 5 TABLET ORAL at 07:41

## 2023-12-25 RX ADMIN — Medication 5 MG: at 20:21

## 2023-12-25 RX ADMIN — POTASSIUM PHOSPHATE, MONOBASIC POTASSIUM PHOSPHATE, DIBASIC 30 MMOL: 224; 236 INJECTION, SOLUTION, CONCENTRATE INTRAVENOUS at 10:15

## 2023-12-25 RX ADMIN — TRAZODONE HYDROCHLORIDE 25 MG: 50 TABLET ORAL at 20:16

## 2023-12-25 ASSESSMENT — PAIN SCALES - GENERAL: PAINLEVEL_OUTOF10: 0

## 2023-12-25 NOTE — PROGRESS NOTES
Zyprex ordered for agitation and combativeness. Held off dose for now d/t writer being able to verbally calm patient down. Patient currently asleep.

## 2023-12-25 NOTE — PROGRESS NOTES
Patient:  Eliezer Concepcion  YOB: 1940   Date of Service:  12/25/23      Urology Attending Daily Progress Note    HPI: admitted with confusion, high calcium  Chief complaint: \"I'm okay\"  ROS:    No bladder pelvic pain.  Not Ambulating  Urine clear in room    Past Medical History:   Diagnosis Date    Actinic keratoses     Basal cell carcinoma nos     COVID-19 01/20/2021    Renal calculi     Skull fracture (HCC)     as a child       No Known Allergies    Objective:    Patient Vitals for the past 8 hrs:   BP Temp Temp src Pulse Resp SpO2   12/25/23 1711 135/69 99.9 °F (37.7 °C) Axillary 80 22 91 %     I/O last 3 completed shifts:  In: 2838.1 [P.O.:240; I.V.:2447; IV Piggyback:151.2]  Out: 3700 [Urine:3700]       Physical Exam:   Constitutional: pleasant patient in no acute distress, normal body habitus  Musculoskeletal: no digital cyanosis, head normocephalic  Psych: alert but not appropriately answering questions  Skin: exposed skin, stable, intact  Abdomen:  stable, nondistended.     Genitourinary: stable bladder,  pink urine in the room    Labs:       Lab Results   Component Value Date    PSA 2.32 12/15/2022    PSA 1.76 05/10/2017    PSA 2.48 02/03/2011       Lab Results   Component Value Date    PSA 2.32 12/15/2022    PSA 1.76 05/10/2017    PSA 2.48 02/03/2011     Recent Labs     12/25/23  0522 12/23/23  0635 12/22/23  1200   WBC 14.3* 15.4* 12.6*   HGB 13.2* 13.7 13.0*   HCT 39.6* 41.6 39.5*   MCV 85.7 86.0 87.0    425 393     No results found for: \"LABA1C\"  Lab Results   Component Value Date    LDLCALC 104 (H) 12/15/2022    CREATININE 0.9 12/25/2023     Lab Results   Component Value Date     12/25/2023    K 3.7 12/25/2023     12/25/2023    CO2 22 12/25/2023    BUN 15 12/25/2023    CREATININE 0.9 12/25/2023    GLUCOSE 107 (H) 12/25/2023    CALCIUM 9.9 12/25/2023    PROT 6.8 12/22/2023    LABALBU 3.1 (L) 12/25/2023    BILITOT 0.4 12/22/2023    ALKPHOS 92 12/22/2023    AST 20

## 2023-12-25 NOTE — CONSULTS
PULMONARY AND CRITICAL CARE INPATIENT NOTE        Eliezer Concepcion   : 1940  MRN: [id number]     Admitting Physician: Sandhya Paul MD  Attending Physician: Jeronimo Rowe MD  PCP: Melquiades Leon MD    Admission: 2023   Date of Service: 2023    Chief Complaint   Patient presents with    Fatigue    Urinary Frequency     Per EMS family called for pt having generalized weakness progressive over the last few weeks and urinary frequency over the last week. Used to perform ADLs independently and is now unable to walk without assistance. Denies acute pain, n/v/d, or dysuria. Pt states \"feels fine\"           ASSESSMENT & PLAN       83 y.o. pleasant  male patient with:    Hospital Problems             Last Modified POA    * (Principal) Acute metabolic encephalopathy 2023 Yes    Hypercalcemia 2023 Yes    Acute UTI 2023 Yes    Hypokalemia 2023 Yes        # Acute hypercalcemia.  Calcium 15.5 s/p calcitonin, zoledronic acid and IV fluids  # Suspected metastatic bladder cancer  # Acute encephalopathy with restlessness and agitation secondary to hypercalcemia  # Lactic acidosis  # UTI/acute cystitis with hematuria  Agreed with medical oncology on CT-guided biopsy for one of the lung lesions.  Orders placed for IR.  If there is any technical difficulty  then we can also do endobronchial ultrasound.  Keep n.p.o. after midnight to see if IR can do the procedure tomorrow  Hold morning dose of Lovenox tomorrow  Antibiotics per primary team  Continue fluids per nephrology  Continue to wean oxygen with target 90 to 94%.  DVT ppx measures.    LOS: Hospital Day: 4    Code:Full Code          Subjective/Objective           CC/Reason for Consult: Hypercalcemia, lung metastasis extubated        HPI: 2023  83-year-old male patient with a past medical history of hypertension who was admitted on  with progressive confusion and fatigue with increased urinary  filed at 12/25/2023 0429  Gross per 24 hour   Intake 240 ml   Output 2300 ml   Net -2060 ml     Net IO Since Admission: -3,981.87 mL [12/25/23 0815]        Physical Exam:  General appearance: In no apparent distress.  HEENT: Moist mucus membranes.  Cardiac: Normal S1 and S2.  Lungs: Good air entry bilaterally.  Abdomen: Soft.  Back & Extremities: Symmetric pulses with good perfusion.  Neurological: No focal deficit..  Wasted.  Confused.      ________________________________________________________  Electronically signed by:  Dimitry Barron MD,FACP    12/25/2023    8:15 AM.     [facility]  [address], [place] [address]   Phone (office): [phone]

## 2023-12-25 NOTE — PROGRESS NOTES
4 Eyes Skin Assessment     The patient is being assess for  Shift Handoff    I agree that 2 RN's have performed a thorough Head to Toe Skin Assessment on the patient. ALL assessment sites listed below have been assessed.       Areas assessed by both nurses: Rizwana PEARCE RN and Ivan SHAH LPN  [x]   Head, Face, and Ears   [x]   Shoulders, Back, and Chest  [x]   Arms, Elbows, and Hands   [x]   Coccyx, Sacrum, and IschIum  [x]   Legs, Feet, and Heels        Does the Patient have Skin Breakdown?  No         Ethan Prevention initiated:  Yes   Wound Care Orders initiated:  No      [facility] nurse consulted for Pressure Injury (Stage 3,4, Unstageable, DTI, NWPT, and Complex wounds), New and Established Ostomies:  No      Nurse 1 eSignature: Electronically signed by IVAN WEST LPN on 12/25/23 at 11:58 AM EST    **SHARE this note so that the co-signing nurse is able to place an eSignature**    Nurse 2 eSignature: Electronically signed by Rizwana Merchant RN on 12/25/23 at 12:05 PM EST

## 2023-12-25 NOTE — PROGRESS NOTES
Hospital Medicine Progress Note      Date of Admission: 12/22/2023  Hospital Day: 4    Chief Admission Complaint: Confusion     Subjective:    Patient sleeping this morning.  Nurse reports patient restless last night.  No nausea vomit.  No fevers or chills.    Presenting Admission History:       Eliezer Concepcion is a 83 y.o. male who presents with confusion and hypercalcemia. PMHx significant for new diagnosis of cancer with bladder mass and lung nodules concerning for metastatic disease, presented with some hematuria and was found to have hypercalcemia. Labs showed potassium of 3.4, calcium of 15.5 with recent calcium from 11/28/2023 at 10.0.  Glucose was 152, white blood cells were elevated at 12.6 and his urinalysis showed large amount of blood with small leukocytosis, chest x-ray showed multiple pulmonary nodules worrisome for metastatic disease, CT scan head showed no acute finding. He had recent CT scan abdomen on 12/4/2023 which showed multiple lung nodules bilateral up to 1.3 cm with recommendation for further testing including PET scan and liver biopsy, also irregular wall thickening of the anterior bladder due to concern of a bladder mass.      Assessment/Plan:      Current Principal Problem:  Acute metabolic encephalopathy    1.  Acute metabolic encephalopathy possibly related to dehydration and hypercalcemia.  Mental is improved.  Continue supportive care.  Patient may have some sundowning.  Will order trazodone at night to help patient sleep  2.  Hypercalcemia.  This may be related to malignancy.  Nephrology consulted.  IV fluids discontinued.  Patient received Zometa and calcitonin.  Continue to monitor.  Calcium improved.  Calcium 9.9  3.  Possible UTI.  Patient was started on Rocephin.  Will continue empirically.  4.  Electrolyte abnormality with hypophosphatemia.  Will supplement.  Phosphorus remains low.  Will supplement.  Repeat phosphorus level later today.    Physical Exam Performed:

## 2023-12-25 NOTE — PROGRESS NOTES
Zyprexa given d/t increased aggression. Pt became less aggressive but more fidgety post inj. Pt eventually tired himself out and fell asleep at 0645.

## 2023-12-25 NOTE — PLAN OF CARE
Problem: Safety - Adult  Goal: Free from fall injury  Outcome: Progressing  Flowsheets (Taken 12/25/2023 0342)  Free From Fall Injury:   Instruct family/caregiver on patient safety   Based on caregiver fall risk screen, instruct family/caregiver to ask for assistance with transferring infant if caregiver noted to have fall risk factors     Problem: ABCDS Injury Assessment  Goal: Absence of physical injury  Outcome: Progressing  Flowsheets (Taken 12/25/2023 0342)  Absence of Physical Injury: Implement safety measures based on patient assessment     Problem: Pain  Goal: Verbalizes/displays adequate comfort level or baseline comfort level  Outcome: Progressing  Flowsheets (Taken 12/25/2023 0342)  Verbalizes/displays adequate comfort level or baseline comfort level:   Encourage patient to monitor pain and request assistance   Administer analgesics based on type and severity of pain and evaluate response   Consider cultural and social influences on pain and pain management   Notify Licensed Independent Practitioner if interventions unsuccessful or patient reports new pain   Implement non-pharmacological measures as appropriate and evaluate response   Assess pain using appropriate pain scale

## 2023-12-25 NOTE — CARE COORDINATION
Case Management Assessment  Initial Evaluation    Date/Time of Evaluation: 12/25/2023 12:22 PM  Assessment Completed by: Kelly Prieto RN    If patient is discharged prior to next notation, then this note serves as note for discharge by case management.    Patient Name: Eliezer Concepcion                   YOB: 1940  Diagnosis: Hypercalcemia [E83.52]                   Date / Time: 12/22/2023 11:45 AM    Patient Admission Status: Inpatient   Readmission Risk (Low < 19, Mod (19-27), High > 27): Readmission Risk Score: 10.1    Current PCP: Melquiades Leon MD  PCP verified by CM? Yes    Chart Reviewed: Yes      History Provided by: Patient  Patient Orientation: Alert and Oriented    Patient Cognition: Alert    Hospitalization in the last 30 days (Readmission):  No    If yes, Readmission Assessment in  Navigator will be completed.    Advance Directives:      Code Status: Full Code   Patient's Primary Decision Maker is: Legal Next of Kin    Primary Decision Maker: Randi Concepcion - Spouse - [phone]    Secondary Decision Maker: Rizwana Pavon - Child - [phone]    Discharge Planning:    Patient lives with: Spouse/Significant Other Type of Home: House  Primary Care Giver: Self  Patient Support Systems include: Spouse/Significant Other, Children, Family Members   Current Financial resources: Medicare  Current community resources: Other (Comment) (n/a)  Current services prior to admission: None            Current DME:              Type of Home Care services:  None    ADLS  Prior functional level: Independent in ADLs/IADLs  Current functional level: Assistance with the following:, Bathing, Dressing, Toileting, Cooking, Housework, Shopping, Mobility    PT AM-PAC: 8 /24  OT AM-PAC: 6 /24    Family can provide assistance at DC: No  Would you like Case Management to discuss the discharge plan with any other family members/significant others, and if so, who? Yes (family)  Plans to Return to Present  Housing: No  Other Identified Issues/Barriers to RETURNING to current housing: unable to care for self at this time.   Potential Assistance needed at discharge: Skilled Nursing Facility            Potential DME:    Patient expects to discharge to: House  Plan for transportation at discharge: Family    Financial    Payor: [employer] MEDICARE / Plan: [employer] GOLD PLUS HMO / Product Type: *No Product type* /     Does insurance require precert for SNF: Yes    Potential assistance Purchasing Medications: No  Meds-to-Beds request:        [facility] [id number] - [place], [place] - [address] P [phone] - F [phone] [address] [address]  Phone: [phone] Fax: [phone]      Notes:    Factors facilitating achievement of predicted outcomes: Family support, Cooperative, Pleasant, and Knowledge about rehab    Barriers to discharge: pending CT-guided biopsy completion, Hypercalcemia, output     Additional Case Management Notes: pt IPTA in a 2 story home w/spouse where they live on the 1st floor. Pt drives and ambulates in the community. Family very supportive. Hx of Ca. SNF recs noted, referral placed to [facility] per pt/family preference. Will follow for needs as they arise.    The Plan for Transition of Care is related to the following treatment goals of Hypercalcemia [E83.52]    IF APPLICABLE: The Patient and/or patient representative Eliezer and his family were provided with a choice of provider and agrees with the discharge plan. Freedom of choice list with basic dialogue that supports the patient's individualized plan of care/goals and shares the quality data associated with the providers was provided to:     Patient Representative Name:       The Patient and/or Patient Representative Agree with the Discharge Plan?      Kelly Prieto RN  Case Management Department

## 2023-12-25 NOTE — PROGRESS NOTES
Nephrology Progress Note   [facility] [email or web address]      Sub/interval history  Confused, sitter at the bedside  Ca 10.3--> 9.9    Last 24 h uop 2.3l    ROS: Unable to obtain  PSFH: No visitor    Scheduled Meds:   potassium phosphate IVPB (PERIPHERAL LINE)  30 mmol IntraVENous Once    potassium chloride  40 mEq Oral Once    amLODIPine  5 mg Oral Daily    melatonin  5 mg Oral Nightly    cefTRIAXone (ROCEPHIN) IV  1,000 mg IntraVENous Q24H    sodium chloride flush  5-40 mL IntraVENous 2 times per day    enoxaparin  40 mg SubCUTAneous Daily     Continuous Infusions:   sodium chloride Stopped (12/23/23 1805)     PRN Meds:.sodium chloride flush, sodium chloride, magnesium sulfate, ondansetron **OR** ondansetron, polyethylene glycol, acetaminophen **OR** acetaminophen    Objective/     Vitals:    12/25/23 0317 12/25/23 0320 12/25/23 0730 12/25/23 0837   BP: (!) 150/74  (!) 152/62    Pulse: 81  87    Resp: 17  18    Temp: 98.8 °F (37.1 °C)  98.3 °F (36.8 °C)    TempSrc: Axillary  Axillary    SpO2: (!) 87% 91% 92% 92%   Weight:       Height:         24HR INTAKE/OUTPUT:    Intake/Output Summary (Last 24 hours) at 12/25/2023 1026  Last data filed at 12/25/2023 1024  Gross per 24 hour   Intake 0 ml   Output 2700 ml   Net -2700 ml       Gen: thin built  Neck: No JVD  Skin: Unremarkable  Cardiovascular:  S1, S2 without m/r/g   Respiratory: CTA B without w/r/r; respiratory effort normal  Abdomen:  soft, nt, nd,   Extremities: no lower extremity edema  Neuro/Psy: confused     Data/  Recent Labs     12/22/23  1200 12/23/23  0635 12/25/23  0522   WBC 12.6* 15.4* 14.3*   HGB 13.0* 13.7 13.2*   HCT 39.5* 41.6 39.6*   MCV 87.0 86.0 85.7    425 378       Recent Labs     12/22/23  1200 12/23/23  0635 12/24/23  0510 12/25/23  0522    142 139 138   K 3.4* 3.5 3.1* 3.7   CL 99 105 106 105   CO2 29 25 22 22   GLUCOSE 152* 117* 107* 107*   PHOS  --  1.3* 1.5* 1.4*   MG 2.00  --   --   --    BUN 22* 17 16 15   CREATININE 1.3 1.1 0.9

## 2023-12-26 ENCOUNTER — APPOINTMENT (OUTPATIENT)
Dept: GENERAL RADIOLOGY | Age: 83
DRG: 686 | End: 2023-12-26
Payer: MEDICARE

## 2023-12-26 ENCOUNTER — APPOINTMENT (OUTPATIENT)
Dept: CT IMAGING | Age: 83
DRG: 686 | End: 2023-12-26
Attending: INTERNAL MEDICINE
Payer: MEDICARE

## 2023-12-26 LAB
ALBUMIN SERPL-MCNC: 2.8 G/DL (ref 3.4–5)
ANION GAP SERPL CALCULATED.3IONS-SCNC: 13 MMOL/L (ref 3–16)
BASOPHILS # BLD: 0 K/UL (ref 0–0.2)
BASOPHILS NFR BLD: 0.4 %
BUN SERPL-MCNC: 16 MG/DL (ref 7–20)
CALCIUM SERPL-MCNC: 9.5 MG/DL (ref 8.3–10.6)
CHLORIDE SERPL-SCNC: 105 MMOL/L (ref 99–110)
CO2 SERPL-SCNC: 20 MMOL/L (ref 21–32)
CREAT SERPL-MCNC: 0.8 MG/DL (ref 0.8–1.3)
DEPRECATED RDW RBC AUTO: 13.1 % (ref 12.4–15.4)
EOSINOPHIL # BLD: 0.1 K/UL (ref 0–0.6)
EOSINOPHIL NFR BLD: 0.8 %
GFR SERPLBLD CREATININE-BSD FMLA CKD-EPI: >60 ML/MIN/{1.73_M2}
GLUCOSE SERPL-MCNC: 94 MG/DL (ref 70–99)
HCT VFR BLD AUTO: 38.7 % (ref 40.5–52.5)
HGB BLD-MCNC: 13 G/DL (ref 13.5–17.5)
INR PPP: 1.16 (ref 0.84–1.16)
LYMPHOCYTES # BLD: 0.9 K/UL (ref 1–5.1)
LYMPHOCYTES NFR BLD: 6.6 %
MCH RBC QN AUTO: 28.7 PG (ref 26–34)
MCHC RBC AUTO-ENTMCNC: 33.7 G/DL (ref 31–36)
MCV RBC AUTO: 85.1 FL (ref 80–100)
MONOCYTES # BLD: 1 K/UL (ref 0–1.3)
MONOCYTES NFR BLD: 7.6 %
NEUTROPHILS # BLD: 11 K/UL (ref 1.7–7.7)
NEUTROPHILS NFR BLD: 84.6 %
PHOSPHATE SERPL-MCNC: 1.6 MG/DL (ref 2.5–4.9)
PLATELET # BLD AUTO: 339 K/UL (ref 135–450)
PMV BLD AUTO: 7.2 FL (ref 5–10.5)
POTASSIUM SERPL-SCNC: 3.5 MMOL/L (ref 3.5–5.1)
PROTHROMBIN TIME: 14.8 SEC (ref 11.5–14.8)
RBC # BLD AUTO: 4.54 M/UL (ref 4.2–5.9)
SODIUM SERPL-SCNC: 138 MMOL/L (ref 136–145)
WBC # BLD AUTO: 13 K/UL (ref 4–11)

## 2023-12-26 PROCEDURE — 2580000003 HC RX 258

## 2023-12-26 PROCEDURE — 6370000000 HC RX 637 (ALT 250 FOR IP)

## 2023-12-26 PROCEDURE — 99152 MOD SED SAME PHYS/QHP 5/>YRS: CPT

## 2023-12-26 PROCEDURE — 80069 RENAL FUNCTION PANEL: CPT

## 2023-12-26 PROCEDURE — 0BBC3ZX EXCISION OF RIGHT UPPER LUNG LOBE, PERCUTANEOUS APPROACH, DIAGNOSTIC: ICD-10-PCS | Performed by: INTERNAL MEDICINE

## 2023-12-26 PROCEDURE — 92610 EVALUATE SWALLOWING FUNCTION: CPT

## 2023-12-26 PROCEDURE — 85610 PROTHROMBIN TIME: CPT

## 2023-12-26 PROCEDURE — 88341 IMHCHEM/IMCYTCHM EA ADD ANTB: CPT

## 2023-12-26 PROCEDURE — 92526 ORAL FUNCTION THERAPY: CPT

## 2023-12-26 PROCEDURE — 85025 COMPLETE CBC W/AUTO DIFF WBC: CPT

## 2023-12-26 PROCEDURE — 99232 SBSQ HOSP IP/OBS MODERATE 35: CPT | Performed by: INTERNAL MEDICINE

## 2023-12-26 PROCEDURE — 0W9930Z DRAINAGE OF RIGHT PLEURAL CAVITY WITH DRAINAGE DEVICE, PERCUTANEOUS APPROACH: ICD-10-PCS | Performed by: INTERNAL MEDICINE

## 2023-12-26 PROCEDURE — 2500000003 HC RX 250 WO HCPCS: Performed by: RADIOLOGY

## 2023-12-26 PROCEDURE — 94761 N-INVAS EAR/PLS OXIMETRY MLT: CPT

## 2023-12-26 PROCEDURE — 88305 TISSUE EXAM BY PATHOLOGIST: CPT

## 2023-12-26 PROCEDURE — 71045 X-RAY EXAM CHEST 1 VIEW: CPT

## 2023-12-26 PROCEDURE — 36415 COLL VENOUS BLD VENIPUNCTURE: CPT

## 2023-12-26 PROCEDURE — 1200000000 HC SEMI PRIVATE

## 2023-12-26 PROCEDURE — 2700000000 HC OXYGEN THERAPY PER DAY

## 2023-12-26 PROCEDURE — 2580000003 HC RX 258: Performed by: INTERNAL MEDICINE

## 2023-12-26 PROCEDURE — C1769 GUIDE WIRE: HCPCS

## 2023-12-26 PROCEDURE — 6360000002 HC RX W HCPCS: Performed by: INTERNAL MEDICINE

## 2023-12-26 PROCEDURE — 2500000003 HC RX 250 WO HCPCS

## 2023-12-26 PROCEDURE — 6360000002 HC RX W HCPCS: Performed by: RADIOLOGY

## 2023-12-26 PROCEDURE — 6370000000 HC RX 637 (ALT 250 FOR IP): Performed by: INTERNAL MEDICINE

## 2023-12-26 PROCEDURE — 32557 INSERT CATH PLEURA W/ IMAGE: CPT

## 2023-12-26 PROCEDURE — 88342 IMHCHEM/IMCYTCHM 1ST ANTB: CPT

## 2023-12-26 RX ORDER — LIDOCAINE HYDROCHLORIDE 10 MG/ML
INJECTION, SOLUTION EPIDURAL; INFILTRATION; INTRACAUDAL; PERINEURAL PRN
Status: COMPLETED | OUTPATIENT
Start: 2023-12-26 | End: 2023-12-26

## 2023-12-26 RX ORDER — FENTANYL CITRATE 50 UG/ML
INJECTION, SOLUTION INTRAMUSCULAR; INTRAVENOUS PRN
Status: COMPLETED | OUTPATIENT
Start: 2023-12-26 | End: 2023-12-26

## 2023-12-26 RX ORDER — MIDAZOLAM HYDROCHLORIDE 5 MG/ML
INJECTION, SOLUTION INTRAMUSCULAR; INTRAVENOUS PRN
Status: COMPLETED | OUTPATIENT
Start: 2023-12-26 | End: 2023-12-26

## 2023-12-26 RX ADMIN — LIDOCAINE HYDROCHLORIDE 9 ML: 10 INJECTION, SOLUTION EPIDURAL; INFILTRATION; INTRACAUDAL; PERINEURAL at 12:30

## 2023-12-26 RX ADMIN — SODIUM CHLORIDE, PRESERVATIVE FREE 10 ML: 5 INJECTION INTRAVENOUS at 08:59

## 2023-12-26 RX ADMIN — POTASSIUM PHOSPHATE, MONOBASIC POTASSIUM PHOSPHATE, DIBASIC 30 MMOL: 224; 236 INJECTION, SOLUTION, CONCENTRATE INTRAVENOUS at 09:42

## 2023-12-26 RX ADMIN — AMLODIPINE BESYLATE 5 MG: 5 TABLET ORAL at 09:04

## 2023-12-26 RX ADMIN — CEFTRIAXONE SODIUM 1000 MG: 1 INJECTION, POWDER, FOR SOLUTION INTRAMUSCULAR; INTRAVENOUS at 09:03

## 2023-12-26 RX ADMIN — NYSTATIN 500000 UNITS: 100000 SUSPENSION ORAL at 17:28

## 2023-12-26 RX ADMIN — ACETAMINOPHEN 650 MG: 325 TABLET ORAL at 15:18

## 2023-12-26 RX ADMIN — FENTANYL CITRATE 25 MCG: 50 INJECTION, SOLUTION INTRAMUSCULAR; INTRAVENOUS at 11:31

## 2023-12-26 RX ADMIN — MIDAZOLAM HYDROCHLORIDE 0.5 MG: 5 INJECTION, SOLUTION INTRAMUSCULAR; INTRAVENOUS at 11:31

## 2023-12-26 RX ADMIN — LIDOCAINE HYDROCHLORIDE 7 ML: 10 INJECTION, SOLUTION EPIDURAL; INFILTRATION; INTRACAUDAL; PERINEURAL at 12:20

## 2023-12-26 ASSESSMENT — PAIN SCALES - GENERAL: PAINLEVEL_OUTOF10: 5

## 2023-12-26 NOTE — PROGRESS NOTES
Shift assessment done as charted. Patient is confused,oriented to self only with sitter at bedside. Due medicines given per MAR. Kept bed lock and in lowest position. Patient's daughter Rizwana was informed that patient will have CT needle Lung biopsy tomorrow and will need her to come to hospital tomorrow to secure for the consent.

## 2023-12-26 NOTE — PROGRESS NOTES
ONCOLOGY HEMATOLOGY CARE PROGRESS NOTE      SUBJECTIVE:    Eliezer is very tired. He awakens to verbal stimuli. Has no specific complaints.     ROS:     Constitutional: fatigue. No weight loss, No fever, No chills, No night sweats.  Eyes:  No impairment or change in vision  ENT / Mouth: dry mouth.  No pain, abnormal ulceration, bleeding, nasal drip or change in voice or hearing  Cardiovascular:  No chest pain, palpitations, new edema, or calf discomfort  Respiratory:  No pain, hemoptysis, change to breathing  Breast:  No pain, discharge, change in appearance or texture  Gastrointestinal:  No pain, cramping, jaundice, change to eating and bowel habits  Urinary:  No pain, bleeding or change in continence  Genitalia: No pain, bleeding or discharge  Musculoskeletal:  No redness, pain, edema or weakness  Skin:  No pruritus, rash, change to nodules or lesions  Neurologic:  No discomfort, change in mental status, speech, sensory or motor activity  Psychiatric:  No change in concentration or change to affect or mood  Endocrine:  No hot flashes, increased thirst, or change to urine production  Hematologic: No petechiae, ecchymosis or bleeding  Lymphatic:  No lymphadenopathy or lymphedema  Allergy / Immunologic:  No eczema, hives, frequent or recurrent infections    OBJECTIVE        Physical    VITALS:  Patient Vitals for the past 24 hrs:   BP Temp Temp src Pulse Resp SpO2   12/26/23 0841 (!) 166/82 97.9 °F (36.6 °C) Oral 91 28 92 %   12/26/23 0331 133/83 98.1 °F (36.7 °C) Axillary 92 21 91 %   12/25/23 2306 (!) 142/73 98.4 °F (36.9 °C) Axillary 97 21 94 %   12/25/23 1953 (!) 152/69 97.8 °F (36.6 °C) Oral 96 22 92 %   12/25/23 1711 135/69 99.9 °F (37.7 °C) Axillary 80 22 91 %       24HR INTAKE/OUTPUT:    Intake/Output Summary (Last 24 hours) at 12/26/2023 0911  Last data filed at 12/26/2023 0423  Gross per 24 hour   Intake 60 ml   Output 3300 ml   Net -3240 ml       CONSTITUTIONAL: awake,  --   --   --   --  1.0* 1.7   BILITOT  --   --   --   --  0.4 0.6   ALKPHOS  --   --   --   --  92 105   ALT  --   --   --   --  29 10   AST  --   --   --   --  20 12*   MG  --   --   --   --  2.00  --        Lab Results   Component Value Date    CALCIUM 9.5 12/26/2023    PHOS 1.6 (L) 12/26/2023       LDH:No results for input(s): \"LDH\" in the last 720 hours.    Radiology Review:  CT CHEST PULMONARY EMBOLISM W CONTRAST  Narrative: EXAMINATION:  CTA OF THE CHEST 12/22/2023 12:50 pm    TECHNIQUE:  CTA of the chest was performed after the administration of intravenous  contrast.  Multiplanar reformatted images are provided for review.  MIP  images are provided for review. Automated exposure control, iterative  reconstruction, and/or weight based adjustment of the mA/kV was utilized to  reduce the radiation dose to as low as reasonably achievable.    COMPARISON:  None.    HISTORY:  ORDERING SYSTEM PROVIDED HISTORY: Fatigue, elevated dimer, left leg swelling  TECHNOLOGIST PROVIDED HISTORY:  Reason for exam:->Fatigue, elevated dimer, left leg swelling  Additional Contrast?->1  Reason for Exam: fatigue, elevated D dimer, left leg swelling, denies chest  pain or SOB    FINDINGS:  Pulmonary Arteries: Pulmonary arteries are adequately opacified for  evaluation.  No evidence of intraluminal filling defect to suggest pulmonary  embolism.  Main pulmonary artery is normal in caliber.    Mediastinum: There are multiple small mediastinal and hilar nodes this is  most pronounced in the subcarinal region.    Lungs/pleura: There are multiple too numerous to count noncalcified non  cavitary pulmonary nodules.  Some of which appear to be coalescing.    Upper Abdomen: Limited images of the upper abdomen are unremarkable.    Soft Tissues/Bones: No acute bone or soft tissue abnormality.  No significant  axillary adenopathy.  Impression: Multiple pulmonary nodules.    Mediastinal and hilar adenopathy.    Findings are most suspicious for

## 2023-12-26 NOTE — CONSULTS
Plan for CT Guided Lung Nodule Biopsy today in the afternoon.     - Please keep NPO and hold pharmacologic blood thinners.  - STAT INR Ordered- Pending  - Further instructions to follow from IR nursing staff

## 2023-12-26 NOTE — PLAN OF CARE
Problem: Safety - Adult  Goal: Free from fall injury  Outcome: Progressing  Flowsheets (Taken 12/25/2023 2306)  Free From Fall Injury: Instruct family/caregiver on patient safety     Problem: Pain  Goal: Verbalizes/displays adequate comfort level or baseline comfort level  Outcome: Progressing  Flowsheets (Taken 12/25/2023 2306)  Verbalizes/displays adequate comfort level or baseline comfort level:   Encourage patient to monitor pain and request assistance   Assess pain using appropriate pain scale   Administer analgesics based on type and severity of pain and evaluate response   Implement non-pharmacological measures as appropriate and evaluate response     Problem: Skin/Tissue Integrity  Goal: Absence of new skin breakdown  Description: 1.  Monitor for areas of redness and/or skin breakdown  2.  Assess vascular access sites hourly  3.  Every 4-6 hours minimum:  Change oxygen saturation probe site  4.  Every 4-6 hours:  If on nasal continuous positive airway pressure, respiratory therapy assess nares and determine need for appliance change or resting period.  Outcome: Progressing

## 2023-12-26 NOTE — PLAN OF CARE
Problem: Safety - Adult  Goal: Free from fall injury  Outcome: Progressing  Flowsheets (Taken 12/25/2023 6480 by Eduardo Dillard RN)  Free From Fall Injury: Instruct family/caregiver on patient safety

## 2023-12-26 NOTE — CARE COORDINATION
Chart reviewed day 4. Care managed by hem/onc, pulmonary, urology, nephrology and IM. Patient with CT guided lung biopsy today. 8 Arabic chest tube placed post biopsy. Can get cysto/bladder bx Thursday if still needed. Speech eval pending. Call to [facility] to check status of referral. Do not have, resent. Thi Dasilva RN

## 2023-12-26 NOTE — OR NURSING
Pt arrived for image guided lung nodule biopsy . Procedure explained including the risk and benefits of the procedure. All questions answered. Phone consent was given by wife Randi Concepcion ([phone]) verbalizes understanding of the of procedure and states no more questions. Consent signed. Vital signs stable, labs, allergies, medications, and code status reviewed. No contraindications noted.     Vitals:    12/26/23 1126   BP: 129/72   Pulse: 91   Resp: 26   Temp:    SpO2: 92%    (vital signs in table format)    Jerry Score  1 - Able to move 2 extremities voluntarily on command  2 - BP+/- 20mmHg of normal  1 - Arousable on calling  1 - Needs oxygen to maintain oxygen saturation >90%  2 - Able to breathe deeply and cough freely    Allergies  Patient has no known allergies.    Labs  Lab Results   Component Value Date    INR 1.16 12/26/2023    PROTIME 14.8 12/26/2023       Lab Results   Component Value Date    CREATININE 0.8 12/26/2023    BUN 16 12/26/2023    GFR >60 02/03/2011     12/26/2023    K 3.5 12/26/2023     12/26/2023    CO2 20 (L) 12/26/2023       Lab Results   Component Value Date    WBC 13.0 (H) 12/26/2023    HGB 13.0 (L) 12/26/2023    HCT 38.7 (L) 12/26/2023    MCV 85.1 12/26/2023     12/26/2023

## 2023-12-26 NOTE — OR NURSING
Time out completed prior to procedure.  Pt was place supine on the ct table.  Pt was placed on all cardiac monitoring. Pt arrived on 3 L NC

## 2023-12-26 NOTE — PROGRESS NOTES
Pt returned to room from lung biopsy. Chest tube needed to be placed. VSS. Pt on 4 L O2. Resting comfortably with sitter and family at bedside.

## 2023-12-26 NOTE — PROGRESS NOTES
.4 Eyes Skin Assessment     The patient is being assess for  4 Eye Shift Assessment     I agree that 2 RN's have performed a thorough Head to Toe Skin Assessment on the patient. ALL assessment sites listed below have been assessed.      Areas assessed by both nurses: PIPER Ramesh &   [x]   Head, Face, and Ears   [x]   Shoulders, Back, and Chest  [x]   Arms, Elbows, and Hands   [x]   Coccyx, Sacrum, and IschIum  [x]   Legs, Feet, and Heels        Does the Patient have Skin Breakdown?  No         Ethan Prevention initiated:  Yes   Wound Care Orders initiated:  NA      [facility] nurse consulted for Pressure Injury (Stage 3,4, Unstageable, DTI, NWPT, and Complex wounds), New and Established Ostomies:  NA      Nurse 1 eSignature: Electronically signed by Domitila Matta RN on 12/26/23 at 3:35 PM EST    **SHARE this note so that the co-signing nurse is able to place an eSignature**    Nurse 2 eSignature: {Esignature:[id number]}

## 2023-12-26 NOTE — OR NURSING
Image guided chest tube insertion right completed. Dr. Jackson placed 8 Greek 25 cm Angiodynamics Exodus Array multipurpose drainage catheter LOT # [id number] EXP 9/30/2026 in the right pleural space. Drain/tube secured at the 14 cm line with SUTURES.  Drain/tube dressing clean, dry, and intact. Pt tolerated procedure without any signs or symptoms of distress. Vital signs stable. Report called to  RN. Pt transported back to [facility] in stable condition via bed.         Vital Signs  Vitals:    12/26/23 1232   BP: (!) 155/90   Pulse: 98   Resp: 30   Temp:    SpO2: 92%    (vital signs in table format)

## 2023-12-26 NOTE — PROGRESS NOTES
V2.0    [facility] [place]      Name:  Eliezer Concepcion /Age/Sex: 1940  (83 y.o. male)   MRN & CSN:  [id number] & [id number] Encounter Date/Time: 2023 7:37 AM EST   Location:  [id number] PCP: Melquiades Leon MD     Attending:Jeronimo Rowe MD       Hospital Day: 5    Assessment and Recommendations   Eliezer Concepcion is a 83 y.o. male with PMH of new diagnosis of cancer with bladder mass and lung nodules concerning for metastatic disease who presents with Acute metabolic encephalopathy    Plan:     Acute metabolic encephalopathy  - CT head showed no acute abnormalities  - Likely secondary to dehydration, hypercalcemia, electrolyte abnormalities  - Continue to monitor CMP and replete accordingly    Hypercalcemia of malignancy  Lung nodules, bladder mass, concern for metastatic disease  - Ca 10.3 on , down from 15.5 on   - Likely secondary to malignancy  - Received calcitonin and Zometa  - Continue to monitor renal function  - CT abd/pelvis : Innumerable pulmonary nodules throughout the bilateral portions of the  visualized lungs measuring up to 1.3 cm in size is highly concerning for metastatic disease. Asymmetric and irregular wall thickening of the anterior bladder is incompletely evaluated on this noncontrast exam. This could represent a bladder mass.  - Patient following Dr. Garcia outpatient and urologist at [facility]. PET scan was scheduled  - Nephrology following   - Agree with plan   - Continue to monitor labs, weights, urine output  - Urology following   - Continue to monitor urine output   - Cystoscopy later this week pending oncologist input otherwise outpatient cystolitholapaxy  - Oncology following   - CT-guided biopsy of pulmonary nodule today  - Pulmonology following   - CT-guided biopsy of pulmonary nodule today    Possible UTI  - Small leukocyte esterase on UA  - Continue IV ceftriaxone    Hypophosphatemia  - 1.6 on   - Continue potassium phosphate repletion

## 2023-12-26 NOTE — PROGRESS NOTES
PULMONARY AND CRITICAL CARE INPATIENT NOTE        Eliezer Concepcion   : 1940  MRN: [id number]     Admitting Physician: Sandhya Paul MD  Attending Physician: Jeronimo Rowe MD  PCP: Melquiades Leon MD    Admission: 2023   Date of Service: 2023    Chief Complaint   Patient presents with    Fatigue    Urinary Frequency     Per EMS family called for pt having generalized weakness progressive over the last few weeks and urinary frequency over the last week. Used to perform ADLs independently and is now unable to walk without assistance. Denies acute pain, n/v/d, or dysuria. Pt states \"feels fine\"           ASSESSMENT & PLAN       83 y.o. 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  Post CT-guided biopsy on .  Pigtail catheter placed by IR  # Acute hypercalcemia.  Calcium 15.5 s/p calcitonin, zoledronic acid and IV fluids  # Suspected metastatic bladder cancer  # Acute encephalopathy with restlessness and agitation secondary to hypercalcemia  # Lactic acidosis  # UTI/acute cystitis with hematuria  Keep chest tube to suction -20.  Will repeat chest x-ray in the morning.  If no pneumothorax will clamp the tube for 4 hours before we discontinue.  IR is okay with us managing chest tube.  Okay to feed the patient  Lovenox hold released  Antibiotics per primary team  Close observe patient and I's and O's and volume status.  Would add IV fluids if the patient's intake is low to avoid kidney dysfunction.  Continue to wean oxygen with target 90 to 94%.  DVT ppx measures.    LOS: Hospital Day: 5    Code:Full Code          Subjective/Objective    DETECTED               ______________  Vitals:    12/26/23 1224 12/26/23 1229 12/26/23 1232 12/26/23 1259   BP: 135/79 127/72 (!) 155/90 130/71   Pulse: 93 94 98 87   Resp: 30 (!) 32 30 18   Temp:    98.6 °F (37 °C)   TempSrc:    Oral   SpO2: 91% 90% 92% 90%   Weight:       Height:              Intake/Output Summary (Last 24 hours) at 12/26/2023 1401  Last data filed at 12/26/2023 1234  Gross per 24 hour   Intake --   Output 2600 ml   Net -2600 ml     Net IO Since Admission: -7,221.87 mL [12/26/23 1401]        Physical Exam:  General appearance: In no apparent distress.  HEENT: Moist mucus membranes.  Cardiac: Normal S1 and S2.  Lungs: Good air entry bilaterally.  Chest tube on the right side anteriorly.  Abdomen: Soft.  Back & Extremities: Symmetric pulses with good perfusion.  Neurological: No focal deficit..  Wasted.  Confused.      ________________________________________________________  Electronically signed by:  Dimitry Barron MD,FACP    12/26/2023    2:01 PM.     [facility]  [address], [place] [address]   Phone (office): [phone]

## 2023-12-26 NOTE — PRE SEDATION
Coumadin Use Last 7 Days:  no  Antiplatelet drug therapy use last 7 days: no  Other anticoagulant use last 7 days: no  Additional Medication Information:  NA      Pre-Sedation Documentation and Exam:   I have reviewed the patient's history and review of systems.    Mallampati Airway Assessment:  Mallampati Class II - (soft palate, fauces & uvula are visible)    Prior History of Anesthesia Complications:   none    ASA Classification:  Class 2 - A normal healthy patient with mild systemic disease    Sedation/ Anesthesia Plan:   intravenous sedation    Medications Planned:   midazolam (Versed) intravenously and fentanyl intravenously    Patient is an appropriate candidate for plan of sedation: yes    Electronically signed by Laly Jackson MD on 12/26/2023 at 11:32 AM

## 2023-12-26 NOTE — BRIEF OP NOTE
Brief Postoperative Note      Patient: Eliezer Concepcion  YOB: 1940  MRN: [id number]    Date of Procedure: 12/26/2023    Indication: Multiple Pulmonary Masses    CT Guided Right Upper Love Nodule biopsy  2.   Ct Guided Right Chest Tube Placement    Post-Op Diagnosis: Same       Surgeon(s):  Laly Jackson MD      Anesthesia: Moderate Sedation    Estimated Blood Loss (mL): Minimal    Complications: Other: Pneumothorax    Specimens:   6 x 20 G biopsy specimens    Implants:  8 fr right chest tube      Drains:   Chest Tube Right Pleural 1 (Active)   $ Chest tube insertion $ Yes 12/26/23 1234   Chest Tube Airleak No 12/26/23 1234   Status Gravity 12/26/23 1234   Suction To water seal 12/26/23 1234   Y Connector Used No 12/26/23 1234   Measurement at Chest Wall  14 12/26/23 1234   Drainage Description Other (Comment) 12/26/23 1234   Dressing Status New dressing applied;Clean, dry & intact 12/26/23 1234   Chest Tube Dressing Split Gauze 12/26/23 1234   Site Assessment Clean, dry & intact 12/26/23 1234   Surrounding Skin Clean, dry & intact 12/26/23 1234   Output (ml) 0 ml 12/26/23 1234       External Urinary Catheter (Active)   Site Assessment Clean,dry & intact 12/26/23 0923   Placement Replaced 12/26/23 0923   Securement Method Securing device (Describe) 12/26/23 0423   Catheter Care Catheter/Wick replaced 12/26/23 0923   Perineal Care Yes 12/26/23 0923   Suction 40 mmgHg continuous 12/26/23 0923   Urine Color Ivy 12/26/23 0423   Urine Appearance Clear 12/26/23 0423   Urine Odor Malodorous 12/26/23 0423   Output (mL) 1300 mL 12/26/23 0423       Findings: Multiple bilateral pulmonary masses. Technically successful biopsy of right upper lobe nodule. Post procedural pneumothorax was noted, requiring chest tube.      Electronically signed by Laly Jackson MD on 12/26/2023 at 1:19 PM   English

## 2023-12-26 NOTE — PLAN OF CARE
Bedside swallow evaluation completed this date.    Keli Brown M.S. CCC-SLP  Speech-language pathologist  [id number]

## 2023-12-26 NOTE — PROGRESS NOTES
Speech Language Pathology  Clinical Bedside Swallow Assessment  Facility/Department: [facility] C3 TELE/MED SURG/ONC        Recommendations:  Diet recommendation: IDDSI 6 Soft and Bite Sized Solids; IDDSI 0 Thin Liquids; Meds whole or crushed in puree  Instrumentation: Not clinically indicated at this time. Will continue to monitor  Risk management: upright for all intake, stay upright for at least 30 mins after intake, small bites/sips, only feed when most alert, 1:1 supervision with intake, oral care q4 hrs to reduce adverse affects in the event of aspiration, increase physical mobility as able, alternate bites/sips, slow rate of intake, general GERD precautions, general aspiration precautions, and hold PO and contact SLP if s/s of aspiration, reduced CARLOS, and/or worsening respiratory status develop.    ST to continue to follow and may complete cognitive-linguistic evaluation in future tx session as clinically indicated.      NAME:Eliezer Concepcion  : 1940 (83 y.o.)   MRN: [id number]  ROOM: [facility]033[facility]-  ADMISSION DATE: 2023  PATIENT DIAGNOSIS(ES): Hypercalcemia [E83.52]  Chief Complaint   Patient presents with    Fatigue    Urinary Frequency     Per EMS family called for pt having generalized weakness progressive over the last few weeks and urinary frequency over the last week. Used to perform ADLs independently and is now unable to walk without assistance. Denies acute pain, n/v/d, or dysuria. Pt states \"feels fine\"     Patient Active Problem List    Diagnosis Date Noted    Renal calculi 2023    Hypercalcemia 2023    Acute metabolic encephalopathy 2023    Acute UTI 2023    Hypokalemia 2023    Acute respiratory disease due to COVID-19 virus 2021     Past Medical History:   Diagnosis Date    Actinic keratoses     Basal cell carcinoma nos     COVID-19 2021    Renal calculi     Skull fracture (HCC)     as a child     Past Surgical History:   Procedure Laterality  identify signs and symptoms of aspiration and make recommendations regarding safe dietary consistencies, effective compensatory strategies, and safe eating environment.    Assessment    Medical record review/interview: Per MD H&P on 12/22/23, \"83 y.o. male who presented to [facility] with confusion and hypercalcemia.  PMHx significant for new diagnosis of cancer with mets.  Patient presented to the emergency room for confusion according to the family, it seems he was diagnosed with the cancer with bladder mass and lung nodules concerning for metastatic disease, presented with some hematuria and was found to have hypercalcemia, patient is having some confusion although he is oriented to self and place but not to time.  It was hard to obtain clear information from the patient.  He denied any fever, chills, chest pain, shortness of breath but looked to be very dehydrated there was no family at bedside.  Labs showed potassium of 3.4, calcium of 15.5 with recent calcium from 11/28/2023 at 10.0.  Glucose was 152, white blood cells were elevated at 12.6 and his urine analysis showed large amount of blood with small leukocytosis, chest x-ray showed multiple pulmonary nodules worrisome for metastatic disease, CT scan head showed no acute finding.  He had recent CT scan abdomen on 12/4/2023 which showed multiple lung nodules bilateral up to 1.3 cm with recommendation for further testing including PET scan, also irregular wall thickening of the anterior bladder due to concern of a bladder mass\".         Predisposing dysphagia risk factors: Age  Clinical signs of possible chronic dysphagia: weight loss  Precipitating dysphagia risk factors: increased O2 demands and AMS    Patient Complaints: *of note, pt is an unreliable historian at this time given confusion; information also gathered from pt's family members at bedside (wife, daughter, and granddaughter present at time of evaluation).  Odynophagia: [] Yes [x] No  Globus

## 2023-12-26 NOTE — OR NURSING
Image guided lung nodule biopsy biopsy completed by Dr. Jackson. Pt tolerated procedure without any signs or symptoms of distress. Vital signs stable.     Total Biopsy: 6  Received: Versed: 0.5 mg       Fentanyl: 25 mcg  Bandage to right chest that is clean dry and intact.       Vital Signs  Vitals:    12/26/23 1203   BP: 128/75   Pulse:    Resp:    Temp:    SpO2:     (vital signs in table format)    Post Jerry  1 - Able to move 2 extremities voluntarily on command  2 - BP+/- 20mmHg of normal  1 - Arousable on calling  1 - Needs oxygen to maintain oxygen saturation >90%  2 - Able to breathe deeply and cough freely

## 2023-12-26 NOTE — PROGRESS NOTES
Pt alert with confusion. VSS. Shift assessment updated and documented. Chest tube with continuous suction. Sitter at bedside.

## 2023-12-26 NOTE — PROGRESS NOTES
Pt Name: Eliezer Concepcion  Medical Record Number: [id number]  Date of Birth 1940   Today's Date: 12/26/2023      Subjective:  Awake in bed, feels more oriented and better than admission. Reports voiding ok. Output pink/clear in purewick.    ROS: Constitutional: No fever    Vitals:  Vitals:    12/25/23 1953 12/25/23 2306 12/26/23 0331 12/26/23 0841   BP: (!) 152/69 (!) 142/73 133/83 (!) 166/82   Pulse: 96 97 92 91   Resp: 22 21 21 28   Temp: 97.8 °F (36.6 °C) 98.4 °F (36.9 °C) 98.1 °F (36.7 °C) 97.9 °F (36.6 °C)   TempSrc: Oral Axillary Axillary Oral   SpO2: 92% 94% 91% 92%   Weight:       Height:         I/O last 3 completed shifts:  In: 60 [P.O.:60]  Out: 4700 [Urine:4700]    Exam:  General: Awake, oriented, no acute distress  Respiratory: Nonlabored breathing  Abdomen: Soft, non-tender, non-distended, no masses  : spontaneously voiding, purewick catheter with clear/pink output  Skin: Skin color, texture, turgor normal, no rashes or lesions  Neurologic: no gross deficits    CURRENT MEDICATIONS   Scheduled Meds:   potassium phosphate IVPB (PERIPHERAL LINE)  30 mmol IntraVENous Once    traZODone  25 mg Oral Nightly    potassium chloride  40 mEq Oral Once    amLODIPine  5 mg Oral Daily    melatonin  5 mg Oral Nightly    cefTRIAXone (ROCEPHIN) IV  1,000 mg IntraVENous Q24H    sodium chloride flush  5-40 mL IntraVENous 2 times per day    [Held by provider] enoxaparin  40 mg SubCUTAneous Daily     Continuous Infusions:   sodium chloride Stopped (12/23/23 1805)     PRN Meds:.sodium chloride flush, sodium chloride, magnesium sulfate, ondansetron **OR** ondansetron, polyethylene glycol, acetaminophen **OR** acetaminophen    LABS     Recent Labs     12/24/23  0510 12/25/23  0522 12/25/23  1752 12/26/23  0558 12/26/23  0849   WBC  --  14.3*  --  13.0*  --    HGB  --  13.2*  --  13.0*  --    HCT  --  39.6*  --  38.7*  --    PLT  --  378  --  339  --     138  --  138  --    K 3.1* 3.7  --  3.5  --     105

## 2023-12-26 NOTE — PROGRESS NOTES
Nephrology Progress Note   [employer]      Sub/interval history    sitter at the bedside  Ca 10.3--> 9.9-->9.5    Last 24 h uop 2.3l    ROS: Unable to obtain  PSFH: No visitor    Scheduled Meds:   potassium phosphate IVPB (PERIPHERAL LINE)  30 mmol IntraVENous Once    traZODone  25 mg Oral Nightly    potassium chloride  40 mEq Oral Once    amLODIPine  5 mg Oral Daily    melatonin  5 mg Oral Nightly    cefTRIAXone (ROCEPHIN) IV  1,000 mg IntraVENous Q24H    sodium chloride flush  5-40 mL IntraVENous 2 times per day    [Held by provider] enoxaparin  40 mg SubCUTAneous Daily     Continuous Infusions:   sodium chloride Stopped (12/23/23 1805)     PRN Meds:.sodium chloride flush, sodium chloride, magnesium sulfate, ondansetron **OR** ondansetron, polyethylene glycol, acetaminophen **OR** acetaminophen    Objective/     Vitals:    12/25/23 1953 12/25/23 2306 12/26/23 0331 12/26/23 0841   BP: (!) 152/69 (!) 142/73 133/83 (!) 166/82   Pulse: 96 97 92 91   Resp: 22 21 21 28   Temp: 97.8 °F (36.6 °C) 98.4 °F (36.9 °C) 98.1 °F (36.7 °C) 97.9 °F (36.6 °C)   TempSrc: Oral Axillary Axillary Oral   SpO2: 92% 94% 91% 92%   Weight:       Height:         24HR INTAKE/OUTPUT:    Intake/Output Summary (Last 24 hours) at 12/26/2023 1006  Last data filed at 12/26/2023 0423  Gross per 24 hour   Intake 60 ml   Output 3300 ml   Net -3240 ml       Gen: thin built  Neck: No JVD  Skin: Unremarkable  Cardiovascular:  S1, S2 without m/r/g   Respiratory: CTA B without w/r/r; respiratory effort normal  Abdomen:  soft, nt, nd,   Extremities: no lower extremity edema  Neuro/Psy: confused     Data/  Recent Labs     12/25/23  0522 12/26/23  0558   WBC 14.3* 13.0*   HGB 13.2* 13.0*   HCT 39.6* 38.7*   MCV 85.7 85.1    339       Recent Labs     12/24/23  0510 12/25/23  0522 12/25/23  1752 12/26/23  0558    138  --  138   K 3.1* 3.7  --  3.5    105  --  105   CO2 22 22  --  20*   GLUCOSE 107* 107*  --  94   PHOS 1.5* 1.4* 2.3*

## 2023-12-27 ENCOUNTER — APPOINTMENT (OUTPATIENT)
Dept: GENERAL RADIOLOGY | Age: 83
DRG: 686 | End: 2023-12-27
Payer: MEDICARE

## 2023-12-27 PROBLEM — C78.02 MALIGNANT NEOPLASM METASTATIC TO BOTH LUNGS (HCC): Status: ACTIVE | Noted: 2023-12-27

## 2023-12-27 PROBLEM — N30.01 ACUTE CYSTITIS WITH HEMATURIA: Status: ACTIVE | Noted: 2023-12-27

## 2023-12-27 PROBLEM — C78.01 MALIGNANT NEOPLASM METASTATIC TO BOTH LUNGS (HCC): Status: ACTIVE | Noted: 2023-12-27

## 2023-12-27 PROBLEM — J95.811 IATROGENIC PNEUMOTHORAX: Status: ACTIVE | Noted: 2023-12-27

## 2023-12-27 LAB
ALBUMIN SERPL-MCNC: 2.8 G/DL (ref 3.4–5)
ANION GAP SERPL CALCULATED.3IONS-SCNC: 11 MMOL/L (ref 3–16)
BASOPHILS # BLD: 0.1 K/UL (ref 0–0.2)
BASOPHILS NFR BLD: 0.4 %
BUN SERPL-MCNC: 18 MG/DL (ref 7–20)
CALCIUM SERPL-MCNC: 9 MG/DL (ref 8.3–10.6)
CHLORIDE SERPL-SCNC: 108 MMOL/L (ref 99–110)
CO2 SERPL-SCNC: 21 MMOL/L (ref 21–32)
CREAT SERPL-MCNC: 0.8 MG/DL (ref 0.8–1.3)
DEPRECATED RDW RBC AUTO: 13 % (ref 12.4–15.4)
EOSINOPHIL # BLD: 0.1 K/UL (ref 0–0.6)
EOSINOPHIL NFR BLD: 0.5 %
GFR SERPLBLD CREATININE-BSD FMLA CKD-EPI: >60 ML/MIN/{1.73_M2}
GLUCOSE SERPL-MCNC: 131 MG/DL (ref 70–99)
HCT VFR BLD AUTO: 37 % (ref 40.5–52.5)
HGB BLD-MCNC: 12.4 G/DL (ref 13.5–17.5)
LYMPHOCYTES # BLD: 0.9 K/UL (ref 1–5.1)
LYMPHOCYTES NFR BLD: 6.1 %
MCH RBC QN AUTO: 28.5 PG (ref 26–34)
MCHC RBC AUTO-ENTMCNC: 33.6 G/DL (ref 31–36)
MCV RBC AUTO: 84.9 FL (ref 80–100)
MONOCYTES # BLD: 1 K/UL (ref 0–1.3)
MONOCYTES NFR BLD: 7 %
NEUTROPHILS # BLD: 12.3 K/UL (ref 1.7–7.7)
NEUTROPHILS NFR BLD: 86 %
PHOSPHATE SERPL-MCNC: 1.5 MG/DL (ref 2.5–4.9)
PHOSPHATE SERPL-MCNC: 2.9 MG/DL (ref 2.5–4.9)
PLATELET # BLD AUTO: 350 K/UL (ref 135–450)
PMV BLD AUTO: 7.5 FL (ref 5–10.5)
POTASSIUM SERPL-SCNC: 3.6 MMOL/L (ref 3.5–5.1)
RBC # BLD AUTO: 4.36 M/UL (ref 4.2–5.9)
SODIUM SERPL-SCNC: 140 MMOL/L (ref 136–145)
WBC # BLD AUTO: 14.3 K/UL (ref 4–11)

## 2023-12-27 PROCEDURE — 94761 N-INVAS EAR/PLS OXIMETRY MLT: CPT

## 2023-12-27 PROCEDURE — 1200000000 HC SEMI PRIVATE

## 2023-12-27 PROCEDURE — 80069 RENAL FUNCTION PANEL: CPT

## 2023-12-27 PROCEDURE — 97535 SELF CARE MNGMENT TRAINING: CPT

## 2023-12-27 PROCEDURE — 2580000003 HC RX 258

## 2023-12-27 PROCEDURE — 97116 GAIT TRAINING THERAPY: CPT

## 2023-12-27 PROCEDURE — 84100 ASSAY OF PHOSPHORUS: CPT

## 2023-12-27 PROCEDURE — 71045 X-RAY EXAM CHEST 1 VIEW: CPT

## 2023-12-27 PROCEDURE — 97530 THERAPEUTIC ACTIVITIES: CPT

## 2023-12-27 PROCEDURE — 2700000000 HC OXYGEN THERAPY PER DAY

## 2023-12-27 PROCEDURE — 85025 COMPLETE CBC W/AUTO DIFF WBC: CPT

## 2023-12-27 PROCEDURE — 36415 COLL VENOUS BLD VENIPUNCTURE: CPT

## 2023-12-27 PROCEDURE — 6360000002 HC RX W HCPCS: Performed by: INTERNAL MEDICINE

## 2023-12-27 PROCEDURE — 2580000003 HC RX 258: Performed by: INTERNAL MEDICINE

## 2023-12-27 PROCEDURE — 6370000000 HC RX 637 (ALT 250 FOR IP): Performed by: INTERNAL MEDICINE

## 2023-12-27 PROCEDURE — 99233 SBSQ HOSP IP/OBS HIGH 50: CPT | Performed by: INTERNAL MEDICINE

## 2023-12-27 PROCEDURE — 2500000003 HC RX 250 WO HCPCS

## 2023-12-27 PROCEDURE — 6370000000 HC RX 637 (ALT 250 FOR IP)

## 2023-12-27 RX ADMIN — SODIUM CHLORIDE: 9 INJECTION, SOLUTION INTRAVENOUS at 10:16

## 2023-12-27 RX ADMIN — Medication 5 MG: at 19:45

## 2023-12-27 RX ADMIN — CEFTRIAXONE SODIUM 1000 MG: 1 INJECTION, POWDER, FOR SOLUTION INTRAMUSCULAR; INTRAVENOUS at 10:16

## 2023-12-27 RX ADMIN — SODIUM CHLORIDE, PRESERVATIVE FREE 10 ML: 5 INJECTION INTRAVENOUS at 09:41

## 2023-12-27 RX ADMIN — TRAZODONE HYDROCHLORIDE 25 MG: 50 TABLET ORAL at 19:45

## 2023-12-27 RX ADMIN — Medication 5 MG: at 00:59

## 2023-12-27 RX ADMIN — NYSTATIN 500000 UNITS: 100000 SUSPENSION ORAL at 19:53

## 2023-12-27 RX ADMIN — SODIUM CHLORIDE, PRESERVATIVE FREE 10 ML: 5 INJECTION INTRAVENOUS at 19:45

## 2023-12-27 RX ADMIN — NYSTATIN 500000 UNITS: 100000 SUSPENSION ORAL at 09:43

## 2023-12-27 RX ADMIN — AMLODIPINE BESYLATE 5 MG: 5 TABLET ORAL at 09:41

## 2023-12-27 RX ADMIN — ENOXAPARIN SODIUM 40 MG: 100 INJECTION SUBCUTANEOUS at 09:41

## 2023-12-27 RX ADMIN — NYSTATIN 500000 UNITS: 100000 SUSPENSION ORAL at 14:31

## 2023-12-27 RX ADMIN — POTASSIUM PHOSPHATE, MONOBASIC AND POTASSIUM PHOSPHATE, DIBASIC 40 MMOL: 224; 236 INJECTION, SOLUTION, CONCENTRATE INTRAVENOUS at 11:11

## 2023-12-27 RX ADMIN — TRAZODONE HYDROCHLORIDE 25 MG: 50 TABLET ORAL at 00:58

## 2023-12-27 NOTE — PROGRESS NOTES
Physical Therapy  Facility/Department: [facility] C3 TELE/MED SURG/ONC  Daily Treatment Note  NAME: Eliezer Concepcion  : 1940  MRN: [id number]    Date of Service: 2023    Discharge Recommendations:  Subacute/Skilled Nursing Facility   PT Equipment Recommendations  Equipment Needed: No  Other: defer to next level of care.    Patient Diagnosis(es): The primary encounter diagnosis was Acute cystitis with hematuria. A diagnosis of Hypercalcemia was also pertinent to this visit.    Therapy discharge recommendations take into account each patient's current medical complexities and are subject to input/oversight from the patient's healthcare team.     Barriers to Home Discharge:   [] Steps to access home entry or bed/bath:   [x] Unable to transfer, ambulate, or propel wheelchair household distances without assist   [] Limited available assist at home upon discharge    [] Patient or family requests d/c to post-acute facility    [x] Poor cognition/safety awareness for d/c to home alone    [] Unable to maintain ordered weight bearing status    [] Patient with salient signs of long-standing immobility   [] Decreased independence with ADLs   [x] Increased risk for falls   [] Other:    If pt is unable to be seen after this session, please let this note serve as discharge summary.  Please see case management note for discharge disposition.  Thank you.'    Assessment   Assessment: Pt seen for co-tx with OT this date to maximize functional outcomes and progress mobility per pt tolerance. Pt motivated to participate and is making good progress toward goals. Pt able to complete bed mobility with modA x2, STS transfers with 2ww and minAx2, and ambulate 4 ft with 2ww and min-modA x2. Pt continues to be limited by his decreased strength, endurance, balance, and oxygen levels. O2 sats after ambulation were 87% however pt able to recover within 30 seconds of seated rest break. Continue to rec SNF upon dc for further skilled PT upon  Exercises: Pt seated EOB x8 minutes with SBA and increased fwd flexion. VC for hand placement to improve. Pt seated on toilet x5 minutes with supv and no LOB.  Static Standing Balance Exercises: Pt stands in Shobha Stedy x3 reps and able to stand for 10-15 seconds each with CGA for balance support. VC for hand placement and upright posture. Pt requires dependent assist for donning/doffing clothes.     Safety Devices  Type of Devices: All fall risk precautions in place;Bed alarm in place;Call light within reach;Gait belt;Nurse notified;Left in bed;Patient at risk for falls;Sitter present  Restraints  Restraints Initially in Place: No     Goals  Short Term Goals  Time Frame for Short Term Goals: 7 days (12/30) unless otherwise noted.  Short Term Goal 1: Pt will demonstrate MAX A X1 with bed mobility.  Short Term Goal 2: pt will participate in funcitonal transfer asessment and training and set goal when appropriate.  Short Term Goal 3: pt will participate in funcitonal gait asessment and training and set goal when appropriate.  Short Term Goal 4: Pt will participate in 4 different BLE exercises of 12-15 reps each to improve strength and endurance by 12/28.  Patient Goals   Patient Goals : pt unable to state goals secondary to decreased cognition.    Education  Patient Education  Education Given To: Patient  Education Provided: Role of Therapy;Plan of Care  Education Provided Comments: role of PT, safety with mobility and line management.  Education Method: Demonstration;Verbal  Barriers to Learning: Cognition  Education Outcome: Verbalized understanding;Continued education needed    AM-PAC - Mobility  AM-PAC Basic Mobility - Inpatient   How much help is needed turning from your back to your side while in a flat bed without using bedrails?: A Little  How much help is needed moving from lying on your back to sitting on the side of a flat bed without using bedrails?: A Lot  How much help is needed moving to and from a bed

## 2023-12-27 NOTE — PROGRESS NOTES
PULMONARY AND CRITICAL CARE INPATIENT NOTE        Eliezer Concepcion   : 1940  MRN: [id number]     Admitting Physician: Sandhya Paul MD  Attending Physician: Jeronimo Rowe MD  PCP: Melquiades Leon MD    Admission: 2023   Date of Service: 2023    Chief Complaint   Patient presents with    Fatigue    Urinary Frequency     Per EMS family called for pt having generalized weakness progressive over the last few weeks and urinary frequency over the last week. Used to perform ADLs independently and is now unable to walk without assistance. Denies acute pain, n/v/d, or dysuria. Pt states \"feels fine\"           ASSESSMENT & PLAN       83 y.o. 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  Post CT-guided biopsy on .  Pigtail catheter placed by IR  # Acute hypercalcemia.  Calcium 15.5 s/p calcitonin, zoledronic acid and IV fluids  # Suspected metastatic bladder cancer  # Acute encephalopathy with restlessness and agitation secondary to hypercalcemia  # Lactic acidosis  # UTI/acute cystitis with hematuria  No evidence of pneumothorax after clamping chest tube for more than 3 hours.  Chest tube discontinued successfully at bedside and occlusive protective dressing applied  Antibiotics per primary team  Follow-up lung nodule biopsy results  Continue to wean oxygen with target 90 to 94%.  DVT ppx measures.    LOS: Hospital Day: 6    Code:Full Code          Subjective/Objective           CC/Reason for Consult: Hypercalcemia, lung metastasis extubated        HPI: 2023  83-year-old male patient with a past medical history of hypertension    CREATININE 0.9  --  0.8 0.8       Recent Labs     12/25/23  0522 12/26/23  0558 12/27/23  0552   GLUCOSE 107* 94 131*       Eosinophils Absolute (K/uL)   Date Value   12/27/2023 0.1     Pro-BNP (pg/mL)   Date Value   12/22/2023 545 (H)     D-Dimer, Quant (ug/mL FEU)   Date Value   12/22/2023 1.56 (H)       Results       Procedure Component Value Units Date/Time    COVID-19 & Influenza Combo [[id number]] Collected: 12/22/23 1210    Order Status: Completed Specimen: Nasopharyngeal Swab Updated: 12/22/23 1245     SARS-CoV-2 RNA, RT PCR NOT DETECTED     Comment: Not Detected results do not preclude SARS-CoV-2 infection and  should not be used as the sole basis for patient management  decisions.  Results must be combined with clinical observations,  patient history, and epidemiological information.  Testing was performed using EDMOND RADHA SARS-CoV-2 and Influenza A/B  nucleic acid assay. This test is a multiplex Real-Time Reverse  Transcriptase Polymerase Chain Reaction (RT-PCR)-based in vitro  diagnostic test intended for the qualitative detection of nucleic  acids from SARS-CoV-2, influenza A, and influenza B in nasopharyngeal  and nasal swab specimens for use under the FDA’s Emergency Use  Authorization (EUA) only.    Patient Fact Sheet:  https://www.fda.gov/media/368148/download  Provider Fact Sheet: https://www.fda.gov/media/125919/download  EUA: https://www.fda.gov/media/956894/download  IFU: https://www.fda.gov/media/481272/download    Methodology:  RT-PCR          INFLUENZA A NOT DETECTED     INFLUENZA B NOT DETECTED               ______________  Vitals:    12/26/23 2227 12/27/23 0045 12/27/23 0824 12/27/23 0941   BP: (!) 158/95 134/70 123/73 123/73   Pulse: 91 98 83    Resp: 18 22 22    Temp: 98.4 °F (36.9 °C) 98.4 °F (36.9 °C) 97.8 °F (36.6 °C)    TempSrc: Oral Oral Oral    SpO2: 91% 91% 91%    Weight:       Height:              Intake/Output Summary (Last 24 hours) at 12/27/2023 1320  Last data filed at

## 2023-12-27 NOTE — CONSULTS
Consult Placed   Added to the treatment team  Who: Renata Sanchez  Date:12/27/2023    Time:11:23AM     Electronically signed by Grecia Pickens on 12/27/2023 at 11:23 AM

## 2023-12-27 NOTE — CARE COORDINATION
Chart reviewed day 5. Care managed by pulmonology, hem/onc, urology, nephrology and IM. Continues with chest tube. Continue to follow renal function and B/P. Swallow eval completed yesterday. Sitter at bedside. Spoke with [facility], can accept patient pending cert. Will need to be sitter free x24hrs and chest tube d/c'd. Thi Dasilva RN      Spoke with therapy requested updated notes for cert. Thi Dasilva RN    Updated family bedside that [facility] has accepted pending cert. Chest tube removal and sitter free. Thi Dasilva RN

## 2023-12-27 NOTE — PROGRESS NOTES
A&Ox1. No complaints of /SOB, chest pain or heaviness. No cyanosis or pallor. No use of accessory muscles when breathing. With intact thoracostomy and  dressing on the right side of chest, no bleeding or discharge connected to continuous wall suction.

## 2023-12-27 NOTE — PROGRESS NOTES
V2.0    [facility] Progress Note      Name:  Eliezer Concepcion /Age/Sex: 1940  (83 y.o. male)   MRN & CSN:  [id number] & [id number] Encounter Date/Time: 2023 7:37 AM EST   Location:  [id number]- PCP: Melquiades Leon MD     Attending:Jeornimo Rowe MD       Hospital Day: 6    Assessment and Recommendations   Eliezer Concepcion is a 83 y.o. male with PMH of new diagnosis of cancer with bladder mass and lung nodules concerning for metastatic disease who presents with Acute metabolic encephalopathy    Plan:     Acute metabolic encephalopathy  - CT head showed no acute abnormalities  - Likely secondary to dehydration, hypercalcemia, electrolyte abnormalities  - Continue to monitor CMP and replete accordingly    Hypercalcemia of malignancy  Lung nodules, bladder mass, concern for metastatic disease  - Ca 10.3 on , down from 15.5 on   - Likely secondary to malignancy  - Received calcitonin and Zometa  - Continue to monitor renal function  - CT abd/pelvis : Innumerable pulmonary nodules throughout the bilateral portions of the  visualized lungs measuring up to 1.3 cm in size is highly concerning for metastatic disease. Asymmetric and irregular wall thickening of the anterior bladder is incompletely evaluated on this noncontrast exam. This could represent a bladder mass.  - Patient following Dr. Garcia outpatient and urologist at [facility]. PET scan was scheduled  - Nephrology following   - Agree with plan   - Continue to monitor labs, weights, urine output  - Urology, oncology, pulmonology following   - Continue to monitor urine output   - Cystoscopy later this week pending oncologist input otherwise outpatient cystolitholapaxy   - CT-guided biopsy of pulmonary nodule on , pending pathology results    Pneumothorax  - S/p lung biopsy  - Chest tube placed on  with suction  - Repeat CXR on  showed no pneumothorax  - Pulmonology following   - Plan to clamp tube for 4 hours before

## 2023-12-27 NOTE — PROGRESS NOTES
Occupational Therapy  Facility/Department: [facility] C3 TELE/MED SURG/ONC  Daily Treatment Note  NAME: Eliezer Concepcion  : 1940  MRN: [id number]  Date of Service: 2023    Discharge Recommendations:  Subacute/Skilled Nursing Facility    AM-PAC score  AM-PAC Inpatient Daily Activity Raw Score: 9 (23)  AM-PAC Inpatient ADL T-Scale Score : 25.33 (23)  ADL Inpatient CMS 0-100% Score: 79.59 (23)  ADL Inpatient CMS G-Code Modifier : CL (23)    Therapy discharge recommendations take into account each patient's current medical complexities and are subject to input/oversight from the patient's healthcare team.   Barriers to Home Discharge:   [] Steps to access home entry or bed/bath:   [x] Unable to transfer, ambulate, or propel wheelchair household distances without assist   [] Limited available assist at home upon discharge    [x] Patient or family requests d/c to post-acute facility    [x] Poor cognition/safety awareness for d/c to home alone    []Unable to maintain ordered weight bearing status    [] Patient with salient signs of long-standing immobility   [x] Patient is at risk for falls    [x] Other: Decreased safety and independence w/ ADLs.     If pt is unable to be seen after this session, please let this note serve as discharge summary.  Please see case management note for discharge disposition.  Thank you.     Patient Diagnosis(es): The primary encounter diagnosis was Acute cystitis with hematuria. A diagnosis of Hypercalcemia was also pertinent to this visit.     Assessment    Assessment: Pt received for OT session resting in bed with multiple family members at bedside to address current functional deficits that inhibit independence and safety with ADLs and functional mobility. Pt agreeable to session, reporting little pain in chest tube site, able to continue with session. During session, pt completed bed mobility w/ modAx2, sit<>stands w/ minAx2 from bed and

## 2023-12-27 NOTE — PROGRESS NOTES
Pt Name: Eliezer Concepcion  Medical Record Number: [id number]  Date of Birth 1940   Today's Date: 12/27/2023      Subjective:  Awake in bed, reports still voiding ok at this time. Urine clear/pink noted to purewick. Chest tube in place.     ROS: Constitutional: No fever    Vitals:  Vitals:    12/27/23 0941 12/27/23 1321 12/27/23 1505 12/27/23 1628   BP: 123/73 110/63 110/63 127/68   Pulse:  86 86 85   Resp:    16   Temp:    97.3 °F (36.3 °C)   TempSrc:    Oral   SpO2:  91% 91% 91%   Weight:       Height:         I/O last 3 completed shifts:  In: 560 [P.O.:560]  Out: 4175 [Urine:4175]    Exam:  General: Awake, confused, no acute distress  Respiratory: Nonlabored breathing  Abdomen: Soft, non-tender, non-distended, no masses  : spontaneously voiding, purewick catheter with clear/pink output  Skin: Skin color, texture, turgor normal, no rashes or lesions  Neurologic: no gross deficits    CURRENT MEDICATIONS   Scheduled Meds:   nystatin  5 mL Oral 4x Daily    traZODone  25 mg Oral Nightly    potassium chloride  40 mEq Oral Once    amLODIPine  5 mg Oral Daily    melatonin  5 mg Oral Nightly    cefTRIAXone (ROCEPHIN) IV  1,000 mg IntraVENous Q24H    sodium chloride flush  5-40 mL IntraVENous 2 times per day    enoxaparin  40 mg SubCUTAneous Daily     Continuous Infusions:   sodium chloride 10 mL/hr at 12/27/23 1016     PRN Meds:.sodium chloride flush, sodium chloride, magnesium sulfate, ondansetron **OR** ondansetron, polyethylene glycol, acetaminophen **OR** acetaminophen    LABS     Recent Labs     12/25/23  0522 12/25/23  1752 12/26/23  0558 12/26/23  0849 12/27/23  0552   WBC 14.3*  --  13.0*  --  14.3*   HGB 13.2*  --  13.0*  --  12.4*   HCT 39.6*  --  38.7*  --  37.0*     --  339  --  350     --  138  --  140   K 3.7  --  3.5  --  3.6     --  105  --  108   CO2 22  --  20*  --  21   BUN 15  --  16  --  18   CREATININE 0.9  --  0.8  --  0.8   PHOS 1.4* 2.3* 1.6*  --  1.5*   CALCIUM 9.9

## 2023-12-27 NOTE — CONSULTS
PALLIATIVE MEDICINE CONSULTATION     Patient name:Eliezer Concepcion   MRN:[id number]    :1940  Room/Bed:0334/0334-01   LOS: 5 days         Date of consult:2023    Inpatient consult to Palliative Care  Consult performed by: Renata Sanchez APRN - CNP  Consult ordered by: Rishi Saunders PA            ASSESSMENT/RECOMMENDATIONS     83 y.o. male with suspected metastatic bladder cancer and new altered mental status      Symptom Management:  Goals of Care- Discussion started with family (patient appeared confused and was intermittently asleep and did not participate).  Expectations set that we are concerned for a rapidly growing cancer and a very poor prognosis.  Family remain optimistic for an improved performance status and aggressive cancer treatment course which could  allow for many more years of meaningful life.  Family would like to wait for results of pathology report before discussing goals of care further.  Comfort care was explained as an alternative treatment route if appropriate based on pathology report and prognosis.  Family would like patient to remain a full code for now.  Will follow    Altered mental status - likely multifactorial from underlying urinary tract infection and hypercalcemia. No baseline cognitive issues.  Family reports significant improvement in the past 48 hours.  Patient is alert today, but does not appear aware of discussions and comments don't seem appropriate to context. Sitter at bedside.    Suspected metastatic bladder cancer -  CT C/A/P shows bladder mass, innumerable lung mets.  Hypercalcemic at 15.5 on admission. S/P CT guided biopsy of pulmonary nodule .  Path pending.  Oncology very concerned for patient's ability to tolerate treatment given PS.  From repeat imaging also concerned that the cancer is quickly spreading         Patient/Family Goals of Care :    23    Spoke with patient and family at bedside.  Patient's wife,  [Urine:4175]  I/O this shift:  In: 360 [P.O.:360]  Out: -       Palliative Medicine Interventions:    patient/family support  Goals of Care discussions with patient/surrogate  Spiritual Interventions: family's pastoral support present        DATA:  Current labs in the epic chart reviewed as of 12/27/2023   Review of previous notes, admits, labs, radiology and testing relevant to this consult done in this chart today 12/27/2023  Review of advance directives (if any) in chart    Medical Decision Making:  The following items were considered in medical decision making:  Review of prior external note(s) from each unique source relevant to today's visit: Hospitalist, Case management, PT/OT/ST, nephrology, oncology, urology  Discussion of management or test with external physician/qualified health care professional: Hospitalist, Case management, oncology  Unique test results reviewed: CBC and BMP, Nutrition labs, Hepatic studies, cardiac labs, CXR        Risk of Complications/Morbidity: High   Illness(es)/ Infection present that pose threat to bodily function.   There is potential for severe exacerbation of condition/side effects of treatment.                      Signed By: Electronically signed by WILTON Yee CNP on 12/27/2023 at 1:49 PM  Palliative Medicine   [phone]    December 27, 2023

## 2023-12-27 NOTE — PROGRESS NOTES
Nephrology Progress Note   [employer]      Sub/interval history    sitter at the bedside  Ca 10.3--> 9.9-->9.5    Last 24 h uop 1.8 l    ROS: Unable to obtain  PSFH: No visitor    Scheduled Meds:   potassium phosphate IVPB (PERIPHERAL LINE)  40 mmol IntraVENous Once    nystatin  5 mL Oral 4x Daily    traZODone  25 mg Oral Nightly    potassium chloride  40 mEq Oral Once    amLODIPine  5 mg Oral Daily    melatonin  5 mg Oral Nightly    cefTRIAXone (ROCEPHIN) IV  1,000 mg IntraVENous Q24H    sodium chloride flush  5-40 mL IntraVENous 2 times per day    enoxaparin  40 mg SubCUTAneous Daily     Continuous Infusions:   sodium chloride Stopped (12/23/23 1805)     PRN Meds:.sodium chloride flush, sodium chloride, magnesium sulfate, ondansetron **OR** ondansetron, polyethylene glycol, acetaminophen **OR** acetaminophen    Objective/     Vitals:    12/26/23 2227 12/27/23 0045 12/27/23 0824 12/27/23 0941   BP: (!) 158/95 134/70 123/73 123/73   Pulse: 91 98 83    Resp: 18 22 22    Temp: 98.4 °F (36.9 °C) 98.4 °F (36.9 °C) 97.8 °F (36.6 °C)    TempSrc: Oral Oral Oral    SpO2: 91% 91% 91%    Weight:       Height:         24HR INTAKE/OUTPUT:    Intake/Output Summary (Last 24 hours) at 12/27/2023 0943  Last data filed at 12/27/2023 0758  Gross per 24 hour   Intake 680 ml   Output 1875 ml   Net -1195 ml       Gen: thin built  Neck: No JVD  Skin: Unremarkable  Cardiovascular:  S1, S2 without m/r/g   Respiratory: CTA B without w/r/r; respiratory effort normal  Abdomen:  soft, nt, nd,   Extremities: no lower extremity edema  Neuro/Psy: confused     Data/  Recent Labs     12/25/23  0522 12/26/23  0558 12/27/23  0552   WBC 14.3* 13.0* 14.3*   HGB 13.2* 13.0* 12.4*   HCT 39.6* 38.7* 37.0*   MCV 85.7 85.1 84.9    339 350       Recent Labs     12/25/23  0522 12/25/23  1752 12/26/23  0558 12/27/23  0552     --  138 140   K 3.7  --  3.5 3.6     --  105 108   CO2 22  --  20* 21   GLUCOSE 107*  --  94 131*   PHOS  1.4* 2.3* 1.6* 1.5*   BUN 15  --  16 18   CREATININE 0.9  --  0.8 0.8   LABGLOM >60  --  >60 >60         Assessment/     - Hypercalcemia of malignancy +/- pre-renal component though degree of elevation not c/w dehydration alone   S/p zometa 12/22      - Lactic acidosis    -HTN     -recent imaging this month revealing suspected bladder/liver mass, lung nodules    Oncology/Urology following    - Hypophosphotemia     Plan/   -Continue amlodipine 5 mg daily   - Replace K-Phos as ordered  -off of IVF's with NS & calcitonin  - Trend labs, bp's, weights, & urine output for hopeful improvement    Rosendo Moeller MD

## 2023-12-28 ENCOUNTER — APPOINTMENT (OUTPATIENT)
Dept: GENERAL RADIOLOGY | Age: 83
DRG: 686 | End: 2023-12-28
Payer: MEDICARE

## 2023-12-28 ENCOUNTER — APPOINTMENT (OUTPATIENT)
Dept: CT IMAGING | Age: 83
DRG: 686 | End: 2023-12-28
Payer: MEDICARE

## 2023-12-28 LAB
ALBUMIN SERPL-MCNC: 2.9 G/DL (ref 3.4–5)
ANION GAP SERPL CALCULATED.3IONS-SCNC: 11 MMOL/L (ref 3–16)
BASOPHILS # BLD: 0.1 K/UL (ref 0–0.2)
BASOPHILS # BLD: 0.1 K/UL (ref 0–0.2)
BASOPHILS NFR BLD: 0.3 %
BASOPHILS NFR BLD: 0.5 %
BUN SERPL-MCNC: 15 MG/DL (ref 7–20)
CALCIUM SERPL-MCNC: 8.5 MG/DL (ref 8.3–10.6)
CHLORIDE SERPL-SCNC: 106 MMOL/L (ref 99–110)
CO2 SERPL-SCNC: 21 MMOL/L (ref 21–32)
CREAT SERPL-MCNC: 0.8 MG/DL (ref 0.8–1.3)
DEPRECATED RDW RBC AUTO: 13.4 % (ref 12.4–15.4)
DEPRECATED RDW RBC AUTO: 13.6 % (ref 12.4–15.4)
EOSINOPHIL # BLD: 0.1 K/UL (ref 0–0.6)
EOSINOPHIL # BLD: 0.1 K/UL (ref 0–0.6)
EOSINOPHIL NFR BLD: 0.4 %
EOSINOPHIL NFR BLD: 0.5 %
GFR SERPLBLD CREATININE-BSD FMLA CKD-EPI: >60 ML/MIN/{1.73_M2}
GLUCOSE SERPL-MCNC: 114 MG/DL (ref 70–99)
HCT VFR BLD AUTO: 36.8 % (ref 40.5–52.5)
HCT VFR BLD AUTO: 37.4 % (ref 40.5–52.5)
HGB BLD-MCNC: 12.2 G/DL (ref 13.5–17.5)
HGB BLD-MCNC: 12.3 G/DL (ref 13.5–17.5)
LACTATE BLDV-SCNC: 2.6 MMOL/L (ref 0.4–1.9)
LYMPHOCYTES # BLD: 0.9 K/UL (ref 1–5.1)
LYMPHOCYTES # BLD: 0.9 K/UL (ref 1–5.1)
LYMPHOCYTES NFR BLD: 5.7 %
LYMPHOCYTES NFR BLD: 6.9 %
MCH RBC QN AUTO: 27.7 PG (ref 26–34)
MCH RBC QN AUTO: 28.3 PG (ref 26–34)
MCHC RBC AUTO-ENTMCNC: 32.8 G/DL (ref 31–36)
MCHC RBC AUTO-ENTMCNC: 33.2 G/DL (ref 31–36)
MCV RBC AUTO: 84.6 FL (ref 80–100)
MCV RBC AUTO: 85 FL (ref 80–100)
MONOCYTES # BLD: 0.9 K/UL (ref 0–1.3)
MONOCYTES # BLD: 1 K/UL (ref 0–1.3)
MONOCYTES NFR BLD: 6.4 %
MONOCYTES NFR BLD: 7.3 %
NEUTROPHILS # BLD: 11.1 K/UL (ref 1.7–7.7)
NEUTROPHILS # BLD: 13 K/UL (ref 1.7–7.7)
NEUTROPHILS NFR BLD: 84.9 %
NEUTROPHILS NFR BLD: 87.1 %
PHOSPHATE SERPL-MCNC: 1.5 MG/DL (ref 2.5–4.9)
PLATELET # BLD AUTO: 357 K/UL (ref 135–450)
PLATELET # BLD AUTO: 381 K/UL (ref 135–450)
PMV BLD AUTO: 7.3 FL (ref 5–10.5)
PMV BLD AUTO: 7.4 FL (ref 5–10.5)
POTASSIUM SERPL-SCNC: 3.8 MMOL/L (ref 3.5–5.1)
RBC # BLD AUTO: 4.33 M/UL (ref 4.2–5.9)
RBC # BLD AUTO: 4.43 M/UL (ref 4.2–5.9)
SODIUM SERPL-SCNC: 138 MMOL/L (ref 136–145)
WBC # BLD AUTO: 13 K/UL (ref 4–11)
WBC # BLD AUTO: 14.9 K/UL (ref 4–11)

## 2023-12-28 PROCEDURE — 85025 COMPLETE CBC W/AUTO DIFF WBC: CPT

## 2023-12-28 PROCEDURE — 6370000000 HC RX 637 (ALT 250 FOR IP)

## 2023-12-28 PROCEDURE — 80069 RENAL FUNCTION PANEL: CPT

## 2023-12-28 PROCEDURE — 83605 ASSAY OF LACTIC ACID: CPT

## 2023-12-28 PROCEDURE — 6370000000 HC RX 637 (ALT 250 FOR IP): Performed by: INTERNAL MEDICINE

## 2023-12-28 PROCEDURE — 87040 BLOOD CULTURE FOR BACTERIA: CPT

## 2023-12-28 PROCEDURE — 6360000004 HC RX CONTRAST MEDICATION: Performed by: INTERNAL MEDICINE

## 2023-12-28 PROCEDURE — 2580000003 HC RX 258: Performed by: INTERNAL MEDICINE

## 2023-12-28 PROCEDURE — 2500000003 HC RX 250 WO HCPCS

## 2023-12-28 PROCEDURE — 2580000003 HC RX 258

## 2023-12-28 PROCEDURE — 94761 N-INVAS EAR/PLS OXIMETRY MLT: CPT

## 2023-12-28 PROCEDURE — 6360000002 HC RX W HCPCS: Performed by: NURSE PRACTITIONER

## 2023-12-28 PROCEDURE — 36415 COLL VENOUS BLD VENIPUNCTURE: CPT

## 2023-12-28 PROCEDURE — 6360000002 HC RX W HCPCS: Performed by: INTERNAL MEDICINE

## 2023-12-28 PROCEDURE — 99232 SBSQ HOSP IP/OBS MODERATE 35: CPT | Performed by: INTERNAL MEDICINE

## 2023-12-28 PROCEDURE — 1200000000 HC SEMI PRIVATE

## 2023-12-28 PROCEDURE — 2700000000 HC OXYGEN THERAPY PER DAY

## 2023-12-28 PROCEDURE — 71045 X-RAY EXAM CHEST 1 VIEW: CPT

## 2023-12-28 PROCEDURE — 71260 CT THORAX DX C+: CPT

## 2023-12-28 RX ORDER — OLANZAPINE 10 MG/2ML
5 INJECTION, POWDER, FOR SOLUTION INTRAMUSCULAR ONCE
Status: COMPLETED | OUTPATIENT
Start: 2023-12-28 | End: 2023-12-28

## 2023-12-28 RX ORDER — WATER 10 ML/10ML
INJECTION INTRAMUSCULAR; INTRAVENOUS; SUBCUTANEOUS
Status: COMPLETED
Start: 2023-12-28 | End: 2023-12-28

## 2023-12-28 RX ADMIN — TRAZODONE HYDROCHLORIDE 25 MG: 50 TABLET ORAL at 19:55

## 2023-12-28 RX ADMIN — SODIUM CHLORIDE, PRESERVATIVE FREE 10 ML: 5 INJECTION INTRAVENOUS at 08:32

## 2023-12-28 RX ADMIN — NYSTATIN 500000 UNITS: 100000 SUSPENSION ORAL at 08:31

## 2023-12-28 RX ADMIN — NYSTATIN 500000 UNITS: 100000 SUSPENSION ORAL at 14:19

## 2023-12-28 RX ADMIN — OLANZAPINE 5 MG: 10 INJECTION, POWDER, FOR SOLUTION INTRAMUSCULAR at 22:12

## 2023-12-28 RX ADMIN — IOPAMIDOL 75 ML: 755 INJECTION, SOLUTION INTRAVENOUS at 16:22

## 2023-12-28 RX ADMIN — ENOXAPARIN SODIUM 40 MG: 100 INJECTION SUBCUTANEOUS at 08:31

## 2023-12-28 RX ADMIN — WATER: 1 INJECTION INTRAMUSCULAR; INTRAVENOUS; SUBCUTANEOUS at 23:47

## 2023-12-28 RX ADMIN — Medication 5 MG: at 19:55

## 2023-12-28 RX ADMIN — POTASSIUM PHOSPHATE, MONOBASIC AND POTASSIUM PHOSPHATE, DIBASIC 40 MMOL: 224; 236 INJECTION, SOLUTION, CONCENTRATE INTRAVENOUS at 10:31

## 2023-12-28 RX ADMIN — AMLODIPINE BESYLATE 5 MG: 5 TABLET ORAL at 08:31

## 2023-12-28 RX ADMIN — SODIUM CHLORIDE: 9 INJECTION, SOLUTION INTRAVENOUS at 08:34

## 2023-12-28 RX ADMIN — CEFTRIAXONE SODIUM 1000 MG: 1 INJECTION, POWDER, FOR SOLUTION INTRAMUSCULAR; INTRAVENOUS at 08:35

## 2023-12-28 RX ADMIN — NYSTATIN 500000 UNITS: 100000 SUSPENSION ORAL at 18:32

## 2023-12-28 NOTE — PROGRESS NOTES
Pt assessment completed and charted. VSS. Pt a/o to self and situation with intermittent confusion. Pt on 9L HFNC at this time- will titrate as tolerated. Pt denies any pain or SOB. Family at bedside. Bed in lowest position and wheels locked. Call light within reach. Bedside table within reach. Non-skid footwear in place. Pt denies any other needs at this time.

## 2023-12-28 NOTE — PROGRESS NOTES
ONCOLOGY HEMATOLOGY CARE PROGRESS NOTE      SUBJECTIVE:    Eliezer is very tired today. He wakes shortly and answers questions but quickly falls back asleep. Denies pain.   Now on 8L O2 via NC.     ROS:     Unclear if this is entirely accurate due to mental status     OBJECTIVE        Physical    VITALS:  Patient Vitals for the past 24 hrs:   BP Temp Temp src Pulse Resp SpO2   12/28/23 0746 110/65 98 °F (36.7 °C) Axillary 80 26 91 %   12/28/23 0541 112/66 98.3 °F (36.8 °C) Oral 82 24 90 %   12/28/23 0115 (!) 146/64 98.6 °F (37 °C) Oral (!) 103 18 92 %   12/27/23 2327 -- 100.3 °F (37.9 °C) Axillary 84 28 90 %   12/27/23 2302 138/72 98.3 °F (36.8 °C) Oral 87 23 92 %   12/27/23 1918 (!) 143/66 98 °F (36.7 °C) Oral 91 16 91 %   12/27/23 1628 127/68 97.3 °F (36.3 °C) Oral 85 16 91 %   12/27/23 1505 110/63 -- -- 86 -- 91 %   12/27/23 1321 110/63 -- -- 86 -- 91 %   12/27/23 0941 123/73 -- -- -- -- --   12/27/23 0824 123/73 97.8 °F (36.6 °C) Oral 83 22 91 %       24HR INTAKE/OUTPUT:    Intake/Output Summary (Last 24 hours) at 12/28/2023 0814  Last data filed at 12/28/2023 0547  Gross per 24 hour   Intake 480 ml   Output 1000 ml   Net -520 ml       CONSTITUTIONAL: awake, alert, cooperative, appears ill. Awakens briefly for questions but quickly falls back asleep     EYES: pupils equal, round and reactive to light, sclera clear and conjunctiva normal  ENT: Normocephalic, without obvious abnormality, atraumatic  NECK: supple, symmetrical, no jugular venous distension   HEMATOLOGIC/LYMPHATIC: no cervical, supraclavicular or axillary lymphadenopathy   LUNGS: increased work of breathing. Diminished lung sounds. Intermittent cough. 8L o2 via NC.   CARDIOVASCULAR: regular rate and rhythm, normal S1 and S2, no murmur noted  ABDOMEN: abdomen slightly distended, nontender, bowel sounds diminished.   MUSCULOSKELETAL: full range of motion noted, tone is normal  NEUROLOGIC: awake, alert, oriented to

## 2023-12-28 NOTE — PROGRESS NOTES
V2.0    [facility] Progress Note      Name:  Eliezer Concepcion /Age/Sex: 1940  (83 y.o. male)   MRN & CSN:  [id number] & [id number] Encounter Date/Time: 2023 7:37 AM EST   Location:  [id number] PCP: Melquiades Leon MD     Attending:Jeronimo Rowe MD       Hospital Day: 7    Assessment and Recommendations   Eliezer Concepcion is a 83 y.o. male with PMH of new diagnosis of cancer with bladder mass and lung nodules concerning for metastatic disease who presents with Acute metabolic encephalopathy    Plan:     Acute metabolic encephalopathy  - CT head showed no acute abnormalities  - Likely secondary to dehydration, hypercalcemia, electrolyte abnormalities  - Continue to monitor CMP and replete accordingly    Hypercalcemia of malignancy  Lung nodules, bladder mass, concern for metastatic disease  - Ca 10.3 on , down from 15.5 on   - Likely secondary to malignancy  - Received calcitonin and Zometa  - Continue to monitor renal function  - CT abd/pelvis : Innumerable pulmonary nodules throughout the bilateral portions of the  visualized lungs measuring up to 1.3 cm in size is highly concerning for metastatic disease. Asymmetric and irregular wall thickening of the anterior bladder is incompletely evaluated on this noncontrast exam. This could represent a bladder mass.  - Patient following Dr. Garcia outpatient and urologist at [facility]. PET scan was scheduled  - Nephrology following   - Agree with plan   - Continue to monitor labs, weights, urine output  - Urology, oncology, pulmonology following   - Continue to monitor urine output   - Hold cystoscopy pending pathology   - CT-guided biopsy of pulmonary nodule on , pending pathology results  - Currently on HFNC 8 L but denies shortness of breath    Pneumothorax (resolved)  - S/p lung biopsy  - Chest tube placed on  with suction  - Repeat CXR on  showed no pneumothorax  - Pulmonology following   - Discontinued chest tube on      Multiple pulmonary nodules. Mediastinal and hilar adenopathy. Findings are most suspicious for metastatic disease, which is the diagnosis of exclusion.  Recommend further evaluation.     CT HEAD WO CONTRAST    Result Date: 12/22/2023  EXAMINATION: CT OF THE HEAD WITHOUT CONTRAST  12/22/2023 12:25 pm TECHNIQUE: CT of the head was performed without the administration of intravenous contrast. Automated exposure control, iterative reconstruction, and/or weight based adjustment of the mA/kV was utilized to reduce the radiation dose to as low as reasonably achievable. COMPARISON: None. HISTORY: ORDERING SYSTEM PROVIDED HISTORY: Confusion TECHNOLOGIST PROVIDED HISTORY: Reason for exam:->Confusion Has a \"code stroke\" or \"stroke alert\" been called?->No Decision Support Exception - unselect if not a suspected or confirmed emergency medical condition->Emergency Medical Condition (MA) Reason for Exam: weakness, confusion, ams today- less alert than yesterday FINDINGS: BRAIN/VENTRICLES: The ventricles and sulci are prominent. Pattern is consistent with age-related atrophy. No extra-axial fluid collections, and no sign of recent intracranial hemorrhage. Decreased attenuation is noted within the periventricular white matter. Pattern is consistent with chronic small vessel ischemic change. No acute edema or mass effect. No mass lesions are detected. ORBITS: The visualized portion of the orbits demonstrate no acute abnormality. SINUSES: The visualized paranasal sinuses and mastoid air cells demonstrate no acute abnormality. SOFT TISSUES/SKULL:  No acute abnormality of the visualized skull or soft tissues.     No acute intracranial abnormality.     XR CHEST PORTABLE    Result Date: 12/22/2023  EXAMINATION: ONE XRAY VIEW OF THE CHEST 12/22/2023 12:01 pm COMPARISON: 30 January 2021 HISTORY: ORDERING SYSTEM PROVIDED HISTORY: generalized weakness TECHNOLOGIST PROVIDED HISTORY: Reason for exam:->generalized weakness Reason for Exam:

## 2023-12-28 NOTE — PROGRESS NOTES
PULMONARY AND CRITICAL CARE INPATIENT NOTE        Eliezer Concepcion   : 1940  MRN: [id number]     Admitting Physician: Sandhya aPul MD  Attending Physician: Jeronimo Rowe MD  PCP: Melquiades Leon MD    Admission: 2023   Date of Service: 2023    Chief Complaint   Patient presents with    Fatigue    Urinary Frequency     Per EMS family called for pt having generalized weakness progressive over the last few weeks and urinary frequency over the last week. Used to perform ADLs independently and is now unable to walk without assistance. Denies acute pain, n/v/d, or dysuria. Pt states \"feels fine\"           ASSESSMENT & PLAN       83 y.o. 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  Confirmed poorly differentiated urothelial carcinoma on biopsy  # Right-sided iatrogenic pneumothorax.  Post CT-guided biopsy on .  Pigtail catheter placed by IRMadeline Isaac and chest tube removed   # Acute hypercalcemia.  Calcium 15.5 s/p calcitonin, zoledronic acid and IV fluids  # Suspected metastatic bladder cancer  # Acute encephalopathy with restlessness and agitation secondary to hypercalcemia  # Lactic acidosis  # UTI/acute cystitis with hematuria  # Oral thrush  # Frail elderly  With worsening hypoxemia and no evidence of pneumothorax will order CTA chest to rule out PE  Antibiotics per primary team  Nystatin for oral thrush  Oncology team following and awaiting palliative care discussion with the family  Continue to wean oxygen with target 90 to 94%.  DVT ppx measures.    LOS:  Labs     12/26/23  0558 12/27/23  0552 12/28/23  0537   WBC 13.0* 14.3* 13.0*   HGB 13.0* 12.4* 12.2*   HCT 38.7* 37.0* 36.8*   MCV 85.1 84.9 85.0    350 357       Recent Labs     12/26/23  0558 12/27/23  0552 12/27/23  1652 12/28/23  0537    140  --  138   K 3.5 3.6  --  3.8    108  --  106   CO2 20* 21  --  21   PHOS 1.6* 1.5* 2.9 1.5*   BUN 16 18  --  15   CREATININE 0.8 0.8  --  0.8       Recent Labs     12/26/23  0558 12/27/23  0552 12/28/23  0537   GLUCOSE 94 131* 114*       Eosinophils Absolute (K/uL)   Date Value   12/28/2023 0.1     Pro-BNP (pg/mL)   Date Value   12/22/2023 545 (H)     D-Dimer, Quant (ug/mL FEU)   Date Value   12/22/2023 1.56 (H)       Results       Procedure Component Value Units Date/Time    COVID-19 & Influenza Combo [[id number]] Collected: 12/22/23 1210    Order Status: Completed Specimen: Nasopharyngeal Swab Updated: 12/22/23 1245     SARS-CoV-2 RNA, RT PCR NOT DETECTED     Comment: Not Detected results do not preclude SARS-CoV-2 infection and  should not be used as the sole basis for patient management  decisions.  Results must be combined with clinical observations,  patient history, and epidemiological information.  Testing was performed using EDMOND RADHA SARS-CoV-2 and Influenza A/B  nucleic acid assay. This test is a multiplex Real-Time Reverse  Transcriptase Polymerase Chain Reaction (RT-PCR)-based in vitro  diagnostic test intended for the qualitative detection of nucleic  acids from SARS-CoV-2, influenza A, and influenza B in nasopharyngeal  and nasal swab specimens for use under the FDA’s Emergency Use  Authorization (EUA) only.    Patient Fact Sheet:  https://www.fda.gov/media/366537/download  Provider Fact Sheet: https://www.fda.gov/media/193785/download  EUA: https://www.fda.gov/media/906496/download  IFU: https://www.fda.gov/media/256054/download    Methodology:  RT-PCR          INFLUENZA A NOT DETECTED     INFLUENZA B NOT DETECTED

## 2023-12-28 NOTE — PROGRESS NOTES
A&Ox2, with disorientation. No complaints of /SOB, chest pain or heaviness. No cyanosis or pallor. With intact dressing on right chest. No use of accessory muscles when breathing. No pain or discomfort as of the moment.

## 2023-12-28 NOTE — CARE COORDINATION
Chart reviewed day 6. Care managed by urology, pulmonology, hem onc, palliative and IM. Patient tolerated CT clamping and chest tube was removed. Currently with O2 at 8LHF. CXR completed today. Continues with sitter. Palliative consult completed yesterday, remains full code. Plan for skilled at [facility] pending cert and chemo radiation to follow. Thi Dasilva RN

## 2023-12-28 NOTE — PROGRESS NOTES
Pt assessment completed and charted. VSS. Pt a/o to self and place with intermittent confusion. Chest tube removed earlier in the day per pulmonologist. Site dressing C\D\I. Pt remains on 3L O2 at this time.Sitter at bedside. Bed in lowest position and wheels locked. Call light within reach. Bedside table within reach. Non-skid footwear in place. Pt denies any other needs at this time.

## 2023-12-28 NOTE — PROGRESS NOTES
Urology Progress Note      Subjective: Eliezer Concepcion awakens briefly and denies complaints, quickly falls back to sleep     Vitals:  /65   Pulse 80   Temp 98 °F (36.7 °C) (Axillary)   Resp 26   Ht 1.778 m (5' 10\")   Wt 70.8 kg (156 lb)   SpO2 91%   BMI 22.38 kg/m²   Temp  Av.4 °F (36.9 °C)  Min: 97.3 °F (36.3 °C)  Max: 100.3 °F (37.9 °C)    Intake/Output Summary (Last 24 hours) at 2023 1011  Last data filed at 2023 0858  Gross per 24 hour   Intake 600 ml   Output 1200 ml   Net -600 ml       Exam:   Physical:   Elderly male sleeping in bed      Male :  Dark salma urine in bedside urine cannister     Labs:  WBC:    Lab Results   Component Value Date/Time    WBC 13.0 2023 05:37 AM     Hemoglobin/Hematocrit:    Lab Results   Component Value Date/Time    HGB 12.2 2023 05:37 AM    HCT 36.8 2023 05:37 AM     BMP:    Lab Results   Component Value Date/Time     2023 05:37 AM    K 3.8 2023 05:37 AM    K 3.4 2023 12:00 PM     2023 05:37 AM    CO2 21 2023 05:37 AM    BUN 15 2023 05:37 AM    LABALBU 2.9 2023 05:37 AM    CREATININE 0.8 2023 05:37 AM    CALCIUM 8.5 2023 05:37 AM    GFRAA >60 2021 06:04 AM    GFRAA >60 2011 10:43 AM    LABGLOM >60 2023 05:37 AM     PT/INR:    Lab Results   Component Value Date/Time    PROTIME 14.8 2023 08:49 AM    INR 1.16 2023 08:49 AM     PTT:  No results found for: \"APTT\"[APTT      Impression/Plan:     Bladder mass  Bladder stone   Gross hematuria   Lung nodules- s/p biopsy, path pending   Hypercalcemia     -Will likely hold off on cystoscopy until after pathology report or worsening urinary symptoms.   - On ceftriaxone for possible UTI, no culture from admission.       Susanna WERNERC  [facility]

## 2023-12-29 ENCOUNTER — APPOINTMENT (OUTPATIENT)
Dept: GENERAL RADIOLOGY | Age: 83
DRG: 686 | End: 2023-12-29
Payer: MEDICARE

## 2023-12-29 LAB
ALBUMIN SERPL-MCNC: 2.5 G/DL (ref 3.4–5)
ANION GAP SERPL CALCULATED.3IONS-SCNC: 12 MMOL/L (ref 3–16)
BASE EXCESS BLDV CALC-SCNC: -1.6 MMOL/L (ref -3–3)
BASOPHILS # BLD: 0.1 K/UL (ref 0–0.2)
BASOPHILS NFR BLD: 0.6 %
BUN SERPL-MCNC: 13 MG/DL (ref 7–20)
CALCIUM SERPL-MCNC: 8.7 MG/DL (ref 8.3–10.6)
CHLORIDE SERPL-SCNC: 105 MMOL/L (ref 99–110)
CO2 BLDV-SCNC: 22 MMOL/L
CO2 SERPL-SCNC: 18 MMOL/L (ref 21–32)
COHGB MFR BLDV: 2.1 % (ref 0–1.5)
CREAT SERPL-MCNC: 0.8 MG/DL (ref 0.8–1.3)
DEPRECATED RDW RBC AUTO: 13.5 % (ref 12.4–15.4)
EOSINOPHIL # BLD: 0.1 K/UL (ref 0–0.6)
EOSINOPHIL NFR BLD: 0.7 %
GFR SERPLBLD CREATININE-BSD FMLA CKD-EPI: >60 ML/MIN/{1.73_M2}
GLUCOSE SERPL-MCNC: 107 MG/DL (ref 70–99)
HCO3 BLDV-SCNC: 21.1 MMOL/L (ref 23–29)
HCT VFR BLD AUTO: 40.4 % (ref 40.5–52.5)
HGB BLD-MCNC: 13.1 G/DL (ref 13.5–17.5)
LACTATE BLDV-SCNC: 1.8 MMOL/L (ref 0.4–1.9)
LYMPHOCYTES # BLD: 0.7 K/UL (ref 1–5.1)
LYMPHOCYTES NFR BLD: 4.5 %
MCH RBC QN AUTO: 27.9 PG (ref 26–34)
MCHC RBC AUTO-ENTMCNC: 32.6 G/DL (ref 31–36)
MCV RBC AUTO: 85.6 FL (ref 80–100)
METHGB MFR BLDV: 0.5 %
MONOCYTES # BLD: 1 K/UL (ref 0–1.3)
MONOCYTES NFR BLD: 6.5 %
NEUTROPHILS # BLD: 13.6 K/UL (ref 1.7–7.7)
NEUTROPHILS NFR BLD: 87.7 %
NT-PROBNP SERPL-MCNC: 702 PG/ML (ref 0–449)
O2 THERAPY: ABNORMAL
PCO2 BLDV: 30.4 MMHG (ref 40–50)
PH BLDV: 7.46 [PH] (ref 7.35–7.45)
PHOSPHATE SERPL-MCNC: 1.8 MG/DL (ref 2.5–4.9)
PLATELET # BLD AUTO: 358 K/UL (ref 135–450)
PMV BLD AUTO: 7.1 FL (ref 5–10.5)
PO2 BLDV: 48.4 MMHG (ref 25–40)
POTASSIUM SERPL-SCNC: 3.9 MMOL/L (ref 3.5–5.1)
RBC # BLD AUTO: 4.71 M/UL (ref 4.2–5.9)
SAO2 % BLDV: 87 %
SODIUM SERPL-SCNC: 135 MMOL/L (ref 136–145)
WBC # BLD AUTO: 15.5 K/UL (ref 4–11)

## 2023-12-29 PROCEDURE — 82803 BLOOD GASES ANY COMBINATION: CPT

## 2023-12-29 PROCEDURE — 94640 AIRWAY INHALATION TREATMENT: CPT

## 2023-12-29 PROCEDURE — 1200000000 HC SEMI PRIVATE

## 2023-12-29 PROCEDURE — 71045 X-RAY EXAM CHEST 1 VIEW: CPT

## 2023-12-29 PROCEDURE — 6370000000 HC RX 637 (ALT 250 FOR IP): Performed by: NURSE PRACTITIONER

## 2023-12-29 PROCEDURE — 83880 ASSAY OF NATRIURETIC PEPTIDE: CPT

## 2023-12-29 PROCEDURE — 80069 RENAL FUNCTION PANEL: CPT

## 2023-12-29 PROCEDURE — 36415 COLL VENOUS BLD VENIPUNCTURE: CPT

## 2023-12-29 PROCEDURE — 85025 COMPLETE CBC W/AUTO DIFF WBC: CPT

## 2023-12-29 PROCEDURE — 94761 N-INVAS EAR/PLS OXIMETRY MLT: CPT

## 2023-12-29 PROCEDURE — 6370000000 HC RX 637 (ALT 250 FOR IP): Performed by: INTERNAL MEDICINE

## 2023-12-29 PROCEDURE — 83605 ASSAY OF LACTIC ACID: CPT

## 2023-12-29 PROCEDURE — 93005 ELECTROCARDIOGRAM TRACING: CPT | Performed by: NURSE PRACTITIONER

## 2023-12-29 PROCEDURE — 99232 SBSQ HOSP IP/OBS MODERATE 35: CPT | Performed by: INTERNAL MEDICINE

## 2023-12-29 PROCEDURE — 2700000000 HC OXYGEN THERAPY PER DAY

## 2023-12-29 RX ORDER — IPRATROPIUM BROMIDE AND ALBUTEROL SULFATE 2.5; .5 MG/3ML; MG/3ML
1 SOLUTION RESPIRATORY (INHALATION) EVERY 4 HOURS PRN
Status: DISCONTINUED | OUTPATIENT
Start: 2023-12-29 | End: 2024-01-01 | Stop reason: HOSPADM

## 2023-12-29 RX ORDER — ONDANSETRON 2 MG/ML
4 INJECTION INTRAMUSCULAR; INTRAVENOUS EVERY 6 HOURS PRN
Status: DISCONTINUED | OUTPATIENT
Start: 2023-12-29 | End: 2024-01-01 | Stop reason: HOSPADM

## 2023-12-29 RX ORDER — LORAZEPAM 2 MG/ML
1 CONCENTRATE ORAL
Status: DISCONTINUED | OUTPATIENT
Start: 2023-12-29 | End: 2024-01-01

## 2023-12-29 RX ORDER — GLYCOPYRROLATE 0.2 MG/ML
0.2 INJECTION INTRAMUSCULAR; INTRAVENOUS EVERY 4 HOURS PRN
Status: DISCONTINUED | OUTPATIENT
Start: 2023-12-29 | End: 2024-01-01 | Stop reason: HOSPADM

## 2023-12-29 RX ORDER — MORPHINE SULFATE 20 MG/ML
5 SOLUTION ORAL
Status: DISCONTINUED | OUTPATIENT
Start: 2023-12-29 | End: 2023-12-31

## 2023-12-29 RX ADMIN — IPRATROPIUM BROMIDE AND ALBUTEROL SULFATE 1 DOSE: 2.5; .5 SOLUTION RESPIRATORY (INHALATION) at 04:23

## 2023-12-29 RX ADMIN — Medication 5 MG: at 21:50

## 2023-12-29 RX ADMIN — Medication 1 MG: at 23:15

## 2023-12-29 NOTE — SIGNIFICANT EVENT
Called to room by nursing staff for concerns of worsening hypoxia, requiring 15L NRB and increasing WOB with retractions. Patient noted to be having retractions, rhonchi noted bilaterally, SpO2 96% on 15L NRB. STAT EKG, CXR, trial DuoNeb, BNP.

## 2023-12-29 NOTE — PROGRESS NOTES
Called family members regarding latest patient's condition that Nps and Drs are still reviewing labs and imaging.

## 2023-12-29 NOTE — PROGRESS NOTES
Comprehensive Nutrition Assessment    Type and Reason for Visit:  Initial, RD Nutrition Re-Screen/LOS    Nutrition Recommendations/Plan:   Continue soft and bite sized diet  Initiate ensures BID   Diet/liquid textures per SLP  Obtain updated weight  Monitor nutrition adequacy, pertinent labs, bowel habits, wt changes, and clinical progress     Malnutrition Assessment:  Malnutrition Status:  At risk for malnutrition (Comment) (12/29/23 1143)    Context:  Acute Illness     Findings of the 6 clinical characteristics of malnutrition:  Energy Intake:  75% or less of estimated energy requirements for 7 or more days    Nutrition Assessment:    LOS: 82 yo M w pmh of new diagnosis of cancer with bladder mass and lung nodules admitted with AMS. On soft and bite sized diet per SLP eval 12/26. Majority po intakes 1-25%, other po intakes % per EMR. Current weight stated, RD to order. Pt with agitation and confusion. Palliative following. Recommend adding ONS to promote po intakes and monitoring for adequate nutrition. Encourage intakes as tolerated. Will monitor.    Nutrition Related Findings:    ON 12 L HFNC. x5 BM 12/25. -10 L since admit. Na 135. Phos 1.8. Wound Type: None       Current Nutrition Intake & Therapies:    Average Meal Intake: 1-25%, 26-50%, 51-75%, %  Average Supplements Intake: None Ordered  ADULT DIET; Dysphagia - Soft and Bite Sized    Anthropometric Measures:  Height: 177.8 cm (5' 10\")  Ideal Body Weight (IBW): 166 lbs (75 kg)       Current Body Weight: 70.8 kg (156 lb 1.4 oz),   IBW. Weight Source: Stated  Current BMI (kg/m2): 22.4        Weight Adjustment For: No Adjustment                 BMI Categories: Normal Weight (BMI 22.0 to 24.9) age over 65    Estimated Daily Nutrient Needs:  Energy Requirements Based On: Kcal/kg (25-30)  Weight Used for Energy Requirements: Current  Energy (kcal/day): 7855-0347 kcals  Weight Used for Protein Requirements: Current (1-1.2)  Protein (g/day): 71-85

## 2023-12-29 NOTE — ACP (ADVANCE CARE PLANNING)
CODE STATUS CHANGE DOCUMENTATION             Date of Conversation: 12/22/2023  Conducted with:  Healthcare Decision Maker: Next of Kin by law (only applies in absence of above) (name) Wife Randi Concepcion.  DaughterLIsa also at bedside    Healthcare Decision Maker:    Primary Decision Maker: Randi Concepcion - Spouse - [phone]    Secondary Decision Maker: Rizwana Pavon - Child - [phone]  Click here to complete Healthcare Decision Makers including selection of the Healthcare Decision Maker Relationship (ie \"Primary\").   Today we documented Decision Maker(s) consistent with Legal Next of Kin hierarchy.    Content/Action Overview:  Has NO ACP documents/care preferences - information provided, considering goals and options  Reviewed DNR/DNI and patient elects DNR order - completed portable DNR form & placed order  treatment goals, benefit/burden of treatment options, ventilation preferences, hospitalization preferences, end of life care preferences (vegetative state/imminent death), and hospice care       Pathology report explained to family by oncology PA.  Concern for a very limited life expectancy at this time. Wife states the goal will be to keep him comfortable.  She does not want him moved, she would like him to pass away right here.      Family anxious about patient surviving long enough for other family to arrive from out of state due to his worsening respiratory status.  Family asked about potential intubation or bipap or NRB.  Explained that if goals are for comfort, such interventions would not make sense.  They agreed they would not want him intubated or put on a bipap, but if he could tolerate a NRB and it could help keep him alive until all family arrived they would want that.    They do not want hospice at this time.  They are ok with comfort meds.  They are all discussing that they do not expect him to survive through the weekend.  They would like to initiate pleasure feeds.  Explained

## 2023-12-29 NOTE — PROGRESS NOTES
Urology Progress Note      Subjective: Eliezer Concepcion is sleeping during rounds.  Bedside sitter reports he had just fallen asleep before I entered therefore I did not wake him.      Vitals:  BP (!) 102/58   Pulse 81   Temp 98.7 °F (37.1 °C) (Axillary)   Resp (!) 31   Ht 1.778 m (5' 10\")   Wt 70.8 kg (156 lb)   SpO2 95%   BMI 22.38 kg/m²   Temp  Av.4 °F (36.9 °C)  Min: 97.6 °F (36.4 °C)  Max: 99.2 °F (37.3 °C)    Intake/Output Summary (Last 24 hours) at 2023 1009  Last data filed at 2023 0445  Gross per 24 hour   Intake 410 ml   Output 1525 ml   Net -1115 ml         Exam:   Physical:   Elderly male sleeping in bed   Increased work of breathing, using 12L HFNC O2      Male :  Light red urine in bedside urine cannister     Labs:  WBC:    Lab Results   Component Value Date/Time    WBC 15.5 2023 04:33 AM     Hemoglobin/Hematocrit:    Lab Results   Component Value Date/Time    HGB 13.1 2023 04:33 AM    HCT 40.4 2023 04:33 AM     BMP:    Lab Results   Component Value Date/Time     2023 04:33 AM    K 3.9 2023 04:33 AM    K 3.4 2023 12:00 PM     2023 04:33 AM    CO2 18 2023 04:33 AM    BUN 13 2023 04:33 AM    LABALBU 2.5 2023 04:33 AM    CREATININE 0.8 2023 04:33 AM    CALCIUM 8.7 2023 04:33 AM    GFRAA >60 2021 06:04 AM    GFRAA >60 2011 10:43 AM    LABGLOM >60 2023 04:33 AM     PT/INR:    Lab Results   Component Value Date/Time    PROTIME 14.8 2023 08:49 AM    INR 1.16 2023 08:49 AM     PTT:  No results found for: \"APTT\"[APTT      Impression/Plan:     #Bladder mass- metastatic urothelial carcinoma confirmed on path from lung nodule biopsy   #Bladder stone   #Gross hematuria   #Hypercalcemia   #Acute hypoxic respiratory failure on HFNC    - Pathology reviewed and shows likely metastatic urothelial carcinoma.  Hem/onc is following.  Could consider cystoscopy pending

## 2023-12-29 NOTE — PROGRESS NOTES
Patient has been aggressive and combative earlier. Sitter has been reordered and scheduled melatonin and trazodone given to calm the patient down. However, after an hour patient started to get out of the bed and be combative again, perfect serve sent to NP lucretia to order a chemical restraint.    -Once dose of Zyprexa 5mg ordered

## 2023-12-29 NOTE — PROGRESS NOTES
PALLIATIVE MEDICINE PROGRESS NOTE     Patient name:Eliezer Concepcion    MRN:[id number] :1940  Room/Bed:0334/0334-01    LOS: 7 days        ASSESSMENT/RECOMMENDATIONS     83 y.o. male with metastatic bladder cancer and acutely worsening respiratory status    Symptom management     1) Goals of care: Comfort care. Family does not want hospice.  With patient's rapid respiratory decline, family do not want him moved.  Comfort meds ordered. Code status changed to DNR CC.  Multiple family members at bedside.  consulted    2) Altered mental status -  likely multifactorial from underlying urinary tract infection and hypercalcemia of malignancy. No baseline cognitive issues.  Family reports significant improvement in alertness in the past 72 hours.  Patient is intermittently alert today, but does not appear aware of discussions and comments don't seem appropriate to context. Sitter at bedside.     3) Metastatic bladder cancer -  path report shows poorly differentiated metastatic carcinoma compatible with urothelial carcinoma. Family had hoped to treat palliatively with chemo, but acute decompensation of pulmonary status precludes this.  Oncology PA able to review path report with family and answer all oncologic questions.  Patient denies pain        Patient/Family Goals of Care :    23    Family meeting with oncology PA, patient's wife, and patient's daughter Rizwana.  Patient's daughter in [place] and a few other family members were on speaker phone.      Pathology report explained to family by oncology PA.  Time given to ask/answer questions.  Concern for a very limited life expectancy at this time was shared. Wife states the goal will be to keep him comfortable.  She does not want him moved, she would like him to pass away right here.       Family anxious about patient surviving long enough for other family to arrive from out of state due to his worsening respiratory status.  Family asked about potential  this patient.     SUBJECTIVE     Chief Complaint: fatigue     Last 24 hours:   Respiratory requirements increased from 8 L to 12L to 15L  Pathology back - confirms aggressive bladder cancer                   Palliative Medicine Interventions:    patient/family support  Goals of Care discussions with patient/surrogate  Spiritual Interventions: consulted         DATA:  Current labs in the epic chart reviewed as of 2023   Review of previous notes, admits, labs, radiology and testing relevant to this consult done in this chart today 2023    Data Reviewed related to this consultation:    Review of prior external note(s) from each unique source relevant to today's visit: Hospitalist, Case management, oncology, pulmonology  Discussion of management or test with external physician/qualified health care professional: Hospitalist, Case management, oncology  Unique test results reviewed: CBC and BMP, Liver studies, cardiac studies, CXR, echo, path report    Risk of Complications/Morbidity: High   Illness(es)/ Infection present that pose threat to bodily function.   There is potential for severe exacerbation of condition/side effects of treatment.  Therapy requires intensive monitoring for toxicity    Time spent on Advanced care [facility] minutes    Counseling and educating the patient/family/caregiver  Preparing to see the patient (e.g., review of tests)  Referring / communicating with other healthcare professionals including care coordination (not separately reported): Hospitalist, Case management  Documenting clinical information in the electronic health record     Signed By: Electronically signed by WILTON Yee CNP on 2023 at 2:36 PM   Palliative Medicine   [phone]    2023

## 2023-12-29 NOTE — PROGRESS NOTES
Pt assessment completed and charted. VSS. Pt a/o to self with intermittent confusion. Pt now on 15L HFNC. Family at bedside. Pt denies any pain or feeling SOB. Bed in lowest position and wheels locked. Call light within reach. Bedside table within reach. Non-skid footwear in place. Family declines any needs at this time. Will continue to monitor.

## 2023-12-29 NOTE — PROGRESS NOTES
12/22/2023 12:10 PM    LEUKOCYTESUR SMALL 12/22/2023 12:10 PM    UROBILINOGEN 0.2 12/22/2023 12:10 PM    BILIRUBINUR Negative 12/22/2023 12:10 PM    BILIRUBINUR small 11/28/2023 10:00 AM    BILIRUBINUR NEGATIVE 02/03/2011 10:49 AM    BLOODU LARGE 12/22/2023 12:10 PM    GLUCOSEU Negative 12/22/2023 12:10 PM    GLUCOSEU NEGATIVE 02/03/2011 10:49 AM    KETUA Negative 12/22/2023 12:10 PM     Urine Cultures:   Lab Results   Component Value Date/Time    LABURIN No growth at 18 to 36 hours 11/15/2023 08:49 AM     Blood Cultures:   Lab Results   Component Value Date/Time    BC No Growth after 4 days of incubation. 01/30/2021 10:47 PM     Lab Results   Component Value Date/Time    BLOODCULT2 No Growth after 4 days of incubation. 01/30/2021 10:47 PM     Organism: No results found for: \"ORG\"    Isaiah Zuniga DO, PGY-1  [facility]

## 2023-12-29 NOTE — PROGRESS NOTES
PULMONARY AND CRITICAL CARE INPATIENT NOTE        Eliezer Concepcion   : 1940  MRN: [id number]     Admitting Physician: Sandhya Paul MD  Attending Physician: Jeronimo Rowe MD  PCP: Melquiades Leon MD    Admission: 2023   Date of Service: 2023    Chief Complaint   Patient presents with    Fatigue    Urinary Frequency     Per EMS family called for pt having generalized weakness progressive over the last few weeks and urinary frequency over the last week. Used to perform ADLs independently and is now unable to walk without assistance. Denies acute pain, n/v/d, or dysuria. Pt states \"feels fine\"           ASSESSMENT & PLAN       83 y.o. pleasant  male patient with:    Hospital Problems             Last Modified POA    * (Principal) Acute metabolic encephalopathy 2023 Yes    Hypercalcemia 2023 Yes    Acute UTI 2023 Yes    Hypokalemia 2023 Yes    Acute cystitis with hematuria 2023 Yes    Iatrogenic pneumothorax 2023 Yes    Malignant neoplasm metastatic to both lungs (HCC) 2023 Yes      # Rapidly progressive hypoxemic respiratory failure.  Secondary to extensive metastasis to the lung.  No PE on CT scan  # Extensive bilateral pulmonary cannonball metastasis.  Confirmed poorly differentiated urothelial carcinoma on biopsy  # Right-sided iatrogenic pneumothorax.  Post CT-guided biopsy on .  Pigtail catheter placed by SAMSON Isaac and chest tube removed   # Acute hypercalcemia.  Calcium 15.5 s/p calcitonin, zoledronic acid and IV fluids  # Suspected metastatic bladder cancer  # Acute encephalopathy with restlessness and agitation secondary to hypercalcemia  # Lactic acidosis  # UTI/acute cystitis with hematuria  # Oral thrush  # Frail elderly  Unfortunately the patient does not seem to be a candidate for any kind of treatment with his clinical status, progressive hypoxemia from cancer after discussion with medical

## 2023-12-29 NOTE — PROGRESS NOTES
ONCOLOGY HEMATOLOGY CARE PROGRESS NOTE      SUBJECTIVE:    Eliezer is minimally responsive this morning. He awakens briefly and says a few words and falls back asleep. Denies pain.   Tachypneic. Now on 12L HFNC.   Family present at the bedside.     ROS:     Unable to assess due to patients mental status     OBJECTIVE        Physical    VITALS:  Patient Vitals for the past 24 hrs:   BP Temp Temp src Pulse Resp SpO2   12/29/23 0759 -- -- -- -- (!) 31 95 %   12/29/23 0758 (!) 102/58 98.7 °F (37.1 °C) Axillary 81 30 95 %   12/29/23 0652 (!) 117/55 99.2 °F (37.3 °C) Oral 86 18 94 %   12/29/23 0445 (!) 146/55 98.1 °F (36.7 °C) Oral 87 28 94 %   12/29/23 0345 131/67 -- -- 89 27 93 %   12/29/23 0330 (!) 143/74 98.1 °F (36.7 °C) Oral 89 26 (!) 87 %   12/28/23 2338 124/64 98.8 °F (37.1 °C) Oral 86 26 92 %   12/28/23 2337 -- 98.8 °F (37.1 °C) -- -- -- --   12/28/23 1945 -- 98.1 °F (36.7 °C) -- -- -- --   12/28/23 1902 (!) 144/75 -- -- 99 -- 90 %   12/28/23 1645 -- -- -- 82 -- 91 %   12/28/23 1526 134/65 98.1 °F (36.7 °C) Oral 80 22 --   12/28/23 1110 -- 97.6 °F (36.4 °C) Oral -- 28 --       24HR INTAKE/OUTPUT:    Intake/Output Summary (Last 24 hours) at 12/29/2023 0908  Last data filed at 12/29/2023 0445  Gross per 24 hour   Intake 410 ml   Output 1525 ml   Net -1115 ml       CONSTITUTIONAL: awakens briefly to verbal/physical stimuli. Lethargic. Confusion noted. Appears acutely ill. Appears thin.   EYES: pupils equal, round and reactive to light, sclera clear and conjunctiva normal  ENT: Normocephalic, without obvious abnormality, atraumatic  NECK: supple, symmetrical, no jugular venous distension   HEMATOLOGIC/LYMPHATIC: no cervical, supraclavicular or axillary lymphadenopathy   LUNGS: tachypnea. On 12L HFNC. Diminished lung sounds diffusely. Congestion. Intermittent dry cough.   CARDIOVASCULAR: tachycardic rate regular rhythm, normal S1 and S2, no murmur noted  ABDOMEN: diminished bowel  metastatic disease noted with cannonball metastases  occupying a large portion of the lung parenchyma.  There is extensive  conglomeration of pulmonary masses especially in the lower lobes with little  residual functional lung.  Suggestion of possible infiltrate in the right  upper lobe consistent with pneumonia.  Small bilateral pleural effusions seen  mostly in the upper chest.    Upper Abdomen: Limited images of the upper abdomen are unremarkable.    Soft Tissues/Bones: No acute bone or soft tissue abnormality.  Impression: 1. No evidence of pulmonary embolism.  2. Extensive pulmonary metastatic disease.  3. Possible right upper lobe pneumonia.  4. Small bilateral pleural effusions.  5. Bilateral hilar lymphadenopathy.  6. No evidence of metastatic disease to the upper abdomen.      Problem List  Patient Active Problem List   Diagnosis    Acute respiratory disease due to COVID-19 virus    Renal calculi    Hypercalcemia    Acute metabolic encephalopathy    Acute UTI    Hypokalemia    Acute cystitis with hematuria    Iatrogenic pneumothorax    Malignant neoplasm metastatic to both lungs (HCC)       ASSESSMENT AND PLAN:    Bladder Mass  Lung metastasis   - concerning for possible metastatic bladder cancer  - CT AP 12/06/2023 showed  - innumerable pulmonary nodules throughout bilateral portions of the visualized lungs measuring up to 1.2cm in size               - asymmetric and irregular wall thickening of anterior bladder              - 1.2 cm jackstone calculus of the bladder   - CTPA 12/22/2023 showed               - multiple pulmonary nodules              - mediastinal and hilar adenopathy  - CT guided biopsy of pulmonary nodule performed this afternoon and patient developed post procedural pneumothorax requiring chest tube   - urology following as well- considering cystolitholapaxy outpatient to help with voiding symptoms, no longer candidate for this given respiratory status    - 12/26: concerned overall that

## 2023-12-29 NOTE — FLOWSHEET NOTE
Physical and Occupational Therapy     PT/OT chart reviewed and attempted to see pt this date. Per RN, pt not appropriate this date for therapy. PT/OT will attempt again as schedule allows and when pt is medically appropriate.     Jose Alberto Alexandra, PT   Shilpa Garg OTR/L

## 2023-12-29 NOTE — ACP (ADVANCE CARE PLANNING)
ADVANCED CARE PLANNING    Name:Eliezer Concepcion       :  1940              MRN:  [id number]  Admission Date: 2023 11:45 AM  Date of Discussion:  2023        Purpose of Encounter: Advanced care planning in light of metastatic bladder cancer.  Parties in attendance: :Eliezer CATIE Carlene, DARY MORRISON MD, Family members: Multiple.  Decisional Capacity: Yes      Objective/Medical Story: Patient mated for UTI and mental status change.  Patient with hypercalcemia.  Patient did undergo biopsy for pulmonary nodules.  Patient with evidence of metastatic bladder cancer.  Pulmonary and hematology following.  Palliative care consulted due to poor prognosis.  Patient requiring more supplemental oxygen.    Active Diagnoses:    Active Hospital Problems    Diagnosis Date Noted    Acute cystitis with hematuria [N30.01] 2023    Iatrogenic pneumothorax [J95.811] 2023    Malignant neoplasm metastatic to both lungs (HCC) [C78.01, C78.02] 2023    Hypercalcemia [E83.52] 2023    Acute metabolic encephalopathy [G93.41] 2023    Acute UTI [N39.0] 2023    Hypokalemia [E87.6] 2023       These active diagnoses are of sufficient risk that focused discussion on advance care planning is indicated to allow the patient to thoughtfully consider personal goals of care; and if situations arise that prevent the ability to personally give input, to ensure appropriate representation of their personal desires for different levels and aggressiveness of care.       Goals of Care Determinations: Patient wishes to focus on comfort.   Code Status: Currently patient wishes to be DNR CC         Time Spent on Advanced Planning Documents: 31mins.    The following items were considered in medical decision making:  Independent review of images , Review / order clinical lab tests. Review / order radiology tests, Decision to obtain old records.    Advanced Care Planning Documents:   Completed

## 2023-12-29 NOTE — PROGRESS NOTES
Messaged BE Medina re:patient's condition and vitals. Patient is tachypneic upon assessment, with use of accessory muscles when breathing. More confused and agitated. O2 is originally on high flow 15 lpm, shifted to non-rebreather mask at 15 lpm.

## 2023-12-29 NOTE — PROGRESS NOTES
Nephrology Progress Note   [facility]      Sub/interval history    sitter at the bedside       Last 24 h uop 1.7 l  Worsening shortness of breath overnight.     ROS: Unable to obtain  PSFH: No visitor    Scheduled Meds:   potassium phosphate IVPB (PERIPHERAL LINE)  30 mmol IntraVENous Once    nystatin  5 mL Oral 4x Daily    traZODone  25 mg Oral Nightly    potassium chloride  40 mEq Oral Once    amLODIPine  5 mg Oral Daily    melatonin  5 mg Oral Nightly    cefTRIAXone (ROCEPHIN) IV  1,000 mg IntraVENous Q24H    sodium chloride flush  5-40 mL IntraVENous 2 times per day    enoxaparin  40 mg SubCUTAneous Daily     Continuous Infusions:   sodium chloride 10 mL/hr at 12/28/23 0834     PRN Meds:.ipratropium 0.5 mg-albuterol 2.5 mg, morphine 20MG/ML, glycopyrrolate, ondansetron, LORazepam, sodium chloride flush, sodium chloride, magnesium sulfate, ondansetron **OR** [DISCONTINUED] ondansetron, polyethylene glycol, acetaminophen **OR** acetaminophen    Objective/     Vitals:    12/29/23 0445 12/29/23 0652 12/29/23 0758 12/29/23 0759   BP: (!) 146/55 (!) 117/55 (!) 102/58    Pulse: 87 86 81    Resp: 28 18 30 (!) 31   Temp: 98.1 °F (36.7 °C) 99.2 °F (37.3 °C) 98.7 °F (37.1 °C)    TempSrc: Oral Oral Axillary    SpO2: 94% 94% 95% 95%   Weight:       Height:         24HR INTAKE/OUTPUT:    Intake/Output Summary (Last 24 hours) at 12/29/2023 1113  Last data filed at 12/29/2023 0445  Gross per 24 hour   Intake 410 ml   Output 1425 ml   Net -1015 ml       Gen: thin built  Neck: No JVD  Skin: Unremarkable  Cardiovascular:  S1, S2 without m/r/g   Respiratory: CTA B without w/r/r; respiratory effort normal  Abdomen:  soft, nt, nd,   Extremities: no lower extremity edema  Neuro/Psy: confused     Data/  Recent Labs     12/28/23  0537 12/28/23  1926 12/29/23  0433   WBC 13.0* 14.9* 15.5*   HGB 12.2* 12.3* 13.1*   HCT 36.8* 37.4* 40.4*   MCV 85.0 84.6 85.6    381 358       Recent Labs     12/27/23  0552 12/27/23  7731

## 2023-12-29 NOTE — CARE COORDINATION
Chart reviewed day 7. Care managed by nephrology, pulmonary, hem onc and IM. Currently on 12 LHF O2. T max 99.2, WBC's 15.5. remains full code. continuing IVATBX. Sitter at bedside. Plan for skilled stay at [facility] pending clinical improvement and cert. Thi Dasilva RN    Palliative spoke with patients family, code status changed to DNR-CC. Family coming in from out of town. Prognosis poor. Thi Dasilva RN    Goals of care changed to comfort. Currently on 15LHF o2. Thi Dasilva RN

## 2023-12-29 NOTE — PROGRESS NOTES
12/29/23 1606   Encounter Summary   Encounter Overview/Reason  Spiritual/Emotional Needs;Grief, Loss, and Adjustments   Service Provided For: Patient;Family   Referral/Consult From: Nurse   Support System Spouse;Children;Family members   Last Encounter  12/29/23  ( visited; provided pastoral support and prayer)   Grief, Loss, and Adjustments   Type Anticipatory Grief   Assessment/Intervention/Outcome   Intervention Prayer (assurance of)/Glennie;Sustaining Presence/Ministry of presence

## 2023-12-30 LAB
EKG ATRIAL RATE: 91 BPM
EKG DIAGNOSIS: NORMAL
EKG P AXIS: 85 DEGREES
EKG P-R INTERVAL: 232 MS
EKG Q-T INTERVAL: 374 MS
EKG QRS DURATION: 114 MS
EKG QTC CALCULATION (BAZETT): 460 MS
EKG R AXIS: -54 DEGREES
EKG T AXIS: 48 DEGREES
EKG VENTRICULAR RATE: 91 BPM

## 2023-12-30 PROCEDURE — 94761 N-INVAS EAR/PLS OXIMETRY MLT: CPT

## 2023-12-30 PROCEDURE — 6370000000 HC RX 637 (ALT 250 FOR IP): Performed by: NURSE PRACTITIONER

## 2023-12-30 PROCEDURE — 2580000003 HC RX 258: Performed by: INTERNAL MEDICINE

## 2023-12-30 PROCEDURE — 2700000000 HC OXYGEN THERAPY PER DAY

## 2023-12-30 PROCEDURE — 6360000002 HC RX W HCPCS: Performed by: INTERNAL MEDICINE

## 2023-12-30 PROCEDURE — 1200000000 HC SEMI PRIVATE

## 2023-12-30 PROCEDURE — 93010 ELECTROCARDIOGRAM REPORT: CPT | Performed by: INTERNAL MEDICINE

## 2023-12-30 RX ADMIN — Medication 1 MG: at 20:56

## 2023-12-30 RX ADMIN — ENOXAPARIN SODIUM 40 MG: 100 INJECTION SUBCUTANEOUS at 10:41

## 2023-12-30 RX ADMIN — SODIUM CHLORIDE, PRESERVATIVE FREE 10 ML: 5 INJECTION INTRAVENOUS at 10:41

## 2023-12-30 NOTE — PROGRESS NOTES
Pt dozing off and on, Pt does know his name, Speech somewhat slurred, Denies pain, but moans some when turning, Pt denies need for pain med, echoed by family at bedside, O-2 15LNC, 88%, pulse ox, Pt ate a few bites of breakfast, Urine salma, purewich changed, Pt turned and repositioned, SR 3/4, Bedcheck on, Family at bedside.

## 2023-12-30 NOTE — PROGRESS NOTES
V2.0    [facility] [place]      Name:  Eliezer Concepcion /Age/Sex: 1940  (83 y.o. male)   MRN & CSN:  [id number] & [id number] Encounter Date/Time: 2023 7:37 AM EST   Location:  0334/0334-01 PCP: Melquiades Leon MD     Attending:Jeronimo Rowe MD       Hospital Day: 9    Assessment and Recommendations   Eliezer Concepcion is a 83 y.o. male with PMH of new diagnosis of cancer with bladder mass and lung nodules concerning for metastatic disease who presents with Acute metabolic encephalopathy    Plan:     Hypercalcemia of malignancy  Lung nodules, bladder mass, concern for metastatic disease  - Likely secondary to malignancy  - Continue to monitor renal function  - CT abd/pelvis : Innumerable pulmonary nodules throughout the bilateral portions of the  visualized lungs measuring up to 1.3 cm in size is highly concerning for metastatic disease. Asymmetric and irregular wall thickening of the anterior bladder is incompletely evaluated on this noncontrast exam. This could represent a bladder mass.  - Nephrology, Urology, oncology, pulmonology following  - Not a candidate for cystoscopy at this point  - CT-guided biopsy of pulmonary nodule on . Pathology results: Metastatic poorly differentiated carcinoma, compatible with urothelial carcinoma  - CT PE  showed no PE  - Continue DuoNebs PRN  - On 15 L HFNC as of   - Palliative care consulted  - Code status changed to DNR-CC on     Acute metabolic encephalopathy  - CT head showed no acute abnormalities  - Likely secondary to dehydration, hypercalcemia, electrolyte abnormalities  - Continue to monitor CMP and replete accordingly    Possible UTI  - Small leukocyte esterase on UA  - Continue IV ceftriaxone    Hypophosphatemia  - Continue potassium phosphate repletion as needed  - Continue to monitor CMP    HTN  - Continue amlodipine    Diet ADULT DIET; Dysphagia - Soft and Bite Sized  ADULT ORAL NUTRITION SUPPLEMENT; Breakfast,  12/28/2023 07:26 PM     No Growth to date.  Any change in status will be called.     Lab Results   Component Value Date/Time    BLOODCULT2  12/28/2023 07:26 PM     No Growth to date.  Any change in status will be called.     Organism: No results found for: \"ORG\"    Isaiah Zuniga DO, PGY-1  [facility]

## 2023-12-30 NOTE — PROGRESS NOTES
Family in with patient. Writer changed patient when family stepped out. Family expressed OK to admin melatonin. Informed to several family members that trazodone can help with sleep too. Will reassess and follow up. Turned patient to right side. Patient states he is not currently in pain. Family will stay overnight.

## 2023-12-31 PROCEDURE — 6360000002 HC RX W HCPCS: Performed by: NURSE PRACTITIONER

## 2023-12-31 PROCEDURE — 6370000000 HC RX 637 (ALT 250 FOR IP): Performed by: NURSE PRACTITIONER

## 2023-12-31 PROCEDURE — 1200000000 HC SEMI PRIVATE

## 2023-12-31 PROCEDURE — 2580000003 HC RX 258: Performed by: INTERNAL MEDICINE

## 2023-12-31 RX ORDER — MORPHINE SULFATE 2 MG/ML
2 INJECTION, SOLUTION INTRAMUSCULAR; INTRAVENOUS
Status: DISCONTINUED | OUTPATIENT
Start: 2023-12-31 | End: 2024-01-01

## 2023-12-31 RX ADMIN — Medication 1 MG: at 10:34

## 2023-12-31 RX ADMIN — MORPHINE SULFATE 2 MG: 2 INJECTION, SOLUTION INTRAMUSCULAR; INTRAVENOUS at 22:08

## 2023-12-31 RX ADMIN — Medication 1 MG: at 01:48

## 2023-12-31 RX ADMIN — Medication 1 MG: at 23:31

## 2023-12-31 RX ADMIN — Medication 5 MG: at 16:52

## 2023-12-31 RX ADMIN — Medication 5 MG: at 11:46

## 2023-12-31 RX ADMIN — Medication 5 MG: at 19:57

## 2023-12-31 RX ADMIN — SODIUM CHLORIDE, PRESERVATIVE FREE 10 ML: 5 INJECTION INTRAVENOUS at 10:39

## 2023-12-31 RX ADMIN — Medication 1 MG: at 18:42

## 2023-12-31 RX ADMIN — SODIUM CHLORIDE, PRESERVATIVE FREE 10 ML: 5 INJECTION INTRAVENOUS at 19:57

## 2023-12-31 NOTE — PROGRESS NOTES
Daily PRN  acetaminophen, 650 mg, Q6H PRN   Or  acetaminophen, 650 mg, Q6H PRN        Labs and Imaging   CT CHEST PULMONARY EMBOLISM W CONTRAST    Result Date: 12/22/2023  EXAMINATION: CTA OF THE CHEST 12/22/2023 12:50 pm TECHNIQUE: CTA of the chest was performed after the administration of intravenous contrast.  Multiplanar reformatted images are provided for review.  MIP images are provided for review. Automated exposure control, iterative reconstruction, and/or weight based adjustment of the mA/kV was utilized to reduce the radiation dose to as low as reasonably achievable. COMPARISON: None. HISTORY: ORDERING SYSTEM PROVIDED HISTORY: Fatigue, elevated dimer, left leg swelling TECHNOLOGIST PROVIDED HISTORY: Reason for exam:->Fatigue, elevated dimer, left leg swelling Additional Contrast?->1 Reason for Exam: fatigue, elevated D dimer, left leg swelling, denies chest pain or SOB FINDINGS: Pulmonary Arteries: Pulmonary arteries are adequately opacified for evaluation.  No evidence of intraluminal filling defect to suggest pulmonary embolism.  Main pulmonary artery is normal in caliber. Mediastinum: There are multiple small mediastinal and hilar nodes this is most pronounced in the subcarinal region. Lungs/pleura: There are multiple too numerous to count noncalcified non cavitary pulmonary nodules.  Some of which appear to be coalescing. Upper Abdomen: Limited images of the upper abdomen are unremarkable. Soft Tissues/Bones: No acute bone or soft tissue abnormality.  No significant axillary adenopathy.     Multiple pulmonary nodules. Mediastinal and hilar adenopathy. Findings are most suspicious for metastatic disease, which is the diagnosis of exclusion.  Recommend further evaluation.     CT HEAD WO CONTRAST    Result Date: 12/22/2023  EXAMINATION: CT OF THE HEAD WITHOUT CONTRAST  12/22/2023 12:25 pm TECHNIQUE: CT of the head was performed without the administration of intravenous contrast. Automated exposure  control, iterative reconstruction, and/or weight based adjustment of the mA/kV was utilized to reduce the radiation dose to as low as reasonably achievable. COMPARISON: None. HISTORY: ORDERING SYSTEM PROVIDED HISTORY: Confusion TECHNOLOGIST PROVIDED HISTORY: Reason for exam:->Confusion Has a \"code stroke\" or \"stroke alert\" been called?->No Decision Support Exception - unselect if not a suspected or confirmed emergency medical condition->Emergency Medical Condition (MA) Reason for Exam: weakness, confusion, ams today- less alert than yesterday FINDINGS: BRAIN/VENTRICLES: The ventricles and sulci are prominent. Pattern is consistent with age-related atrophy. No extra-axial fluid collections, and no sign of recent intracranial hemorrhage. Decreased attenuation is noted within the periventricular white matter. Pattern is consistent with chronic small vessel ischemic change. No acute edema or mass effect. No mass lesions are detected. ORBITS: The visualized portion of the orbits demonstrate no acute abnormality. SINUSES: The visualized paranasal sinuses and mastoid air cells demonstrate no acute abnormality. SOFT TISSUES/SKULL:  No acute abnormality of the visualized skull or soft tissues.     No acute intracranial abnormality.     XR CHEST PORTABLE    Result Date: 12/22/2023  EXAMINATION: ONE XRAY VIEW OF THE CHEST 12/22/2023 12:01 pm COMPARISON: 30 January 2021 HISTORY: ORDERING SYSTEM PROVIDED HISTORY: generalized weakness TECHNOLOGIST PROVIDED HISTORY: Reason for exam:->generalized weakness Reason for Exam: Fatigue; Urinary Frequency FINDINGS: Single-view chest demonstrates multiple nodules throughout the lungs with some consolidation in the bases.  The heart is normal in size.  The trachea is midline hilar symmetrical.     Multiple pulmonary nodules worrisome for metastatic disease.  Less likely this represents infiltrate or granulomatous disease.  Recommend further evaluation.  This is new compared to the prior

## 2023-12-31 NOTE — PROGRESS NOTES
LUIS WILKERSON,    Patient has oxygen on. Requesting a nasal spray to help with his dryness in his nose. Thank you.

## 2023-12-31 NOTE — PROGRESS NOTES
Pt's eyes closed,mumbles a few words, resp 30, O-2 15L high flow nasal cannula ribs retracting,grasp weak, follows command, some restlessness, Family in agreement for Morphine, Morphine 5 mg SL give as ordered, Pt repositioned, oral care given, SR 2/4, Multiple family members at bedside, Will monitor.

## 2023-12-31 NOTE — PROGRESS NOTES
Occupational Therapy  Pt attempted to see for therapy this date. Upon checking with RN, RN states pt not appropriate for therapy session or OT services at this time due to medical status. Will sign off pt at this time. If/when pt becomes appropriate for OT please place new orders.     Amber Reynaga, OTR/L

## 2024-01-01 VITALS
OXYGEN SATURATION: 89 % | WEIGHT: 156 LBS | BODY MASS INDEX: 22.33 KG/M2 | TEMPERATURE: 98.4 F | SYSTOLIC BLOOD PRESSURE: 114 MMHG | HEIGHT: 70 IN | DIASTOLIC BLOOD PRESSURE: 61 MMHG

## 2024-01-01 LAB
BACTERIA BLD CULT ORG #2: NORMAL
BACTERIA BLD CULT: NORMAL

## 2024-01-01 PROCEDURE — 6360000002 HC RX W HCPCS: Performed by: NURSE PRACTITIONER

## 2024-01-01 PROCEDURE — 6360000002 HC RX W HCPCS: Performed by: INTERNAL MEDICINE

## 2024-01-01 PROCEDURE — 2580000003 HC RX 258: Performed by: INTERNAL MEDICINE

## 2024-01-01 PROCEDURE — 6370000000 HC RX 637 (ALT 250 FOR IP): Performed by: NURSE PRACTITIONER

## 2024-01-01 RX ORDER — MORPHINE SULFATE 2 MG/ML
2 INJECTION, SOLUTION INTRAMUSCULAR; INTRAVENOUS
Status: DISCONTINUED | OUTPATIENT
Start: 2024-01-01 | End: 2024-01-01

## 2024-01-01 RX ORDER — MORPHINE SULFATE 4 MG/ML
4 INJECTION, SOLUTION INTRAMUSCULAR; INTRAVENOUS
Status: DISCONTINUED | OUTPATIENT
Start: 2024-01-01 | End: 2024-01-01

## 2024-01-01 RX ORDER — DIAZEPAM 5 MG/ML
5 INJECTION, SOLUTION INTRAMUSCULAR; INTRAVENOUS
Status: DISCONTINUED | OUTPATIENT
Start: 2024-01-01 | End: 2024-01-01

## 2024-01-01 RX ORDER — MORPHINE SULFATE 2 MG/ML
2 INJECTION, SOLUTION INTRAMUSCULAR; INTRAVENOUS EVERY 30 MIN PRN
Status: DISCONTINUED | OUTPATIENT
Start: 2024-01-01 | End: 2024-01-01 | Stop reason: HOSPADM

## 2024-01-01 RX ORDER — LORAZEPAM 2 MG/ML
1 INJECTION INTRAMUSCULAR
Status: DISCONTINUED | OUTPATIENT
Start: 2024-01-01 | End: 2024-01-01

## 2024-01-01 RX ORDER — DIAZEPAM 5 MG/ML
2.5 INJECTION, SOLUTION INTRAMUSCULAR; INTRAVENOUS
Status: DISCONTINUED | OUTPATIENT
Start: 2024-01-01 | End: 2024-01-01 | Stop reason: HOSPADM

## 2024-01-01 RX ADMIN — DIAZEPAM 5 MG: 5 INJECTION, SOLUTION INTRAMUSCULAR; INTRAVENOUS at 07:17

## 2024-01-01 RX ADMIN — MORPHINE SULFATE 4 MG: 4 INJECTION, SOLUTION INTRAMUSCULAR; INTRAVENOUS at 06:13

## 2024-01-01 RX ADMIN — MORPHINE SULFATE 2 MG: 2 INJECTION, SOLUTION INTRAMUSCULAR; INTRAVENOUS at 02:07

## 2024-01-01 RX ADMIN — MORPHINE SULFATE 2 MG: 2 INJECTION, SOLUTION INTRAMUSCULAR; INTRAVENOUS at 16:55

## 2024-01-01 RX ADMIN — SODIUM CHLORIDE, PRESERVATIVE FREE 10 ML: 5 INJECTION INTRAVENOUS at 09:49

## 2024-01-01 RX ADMIN — MORPHINE SULFATE 4 MG: 4 INJECTION, SOLUTION INTRAMUSCULAR; INTRAVENOUS at 04:20

## 2024-01-01 RX ADMIN — MORPHINE SULFATE 2 MG: 2 INJECTION, SOLUTION INTRAMUSCULAR; INTRAVENOUS at 15:30

## 2024-01-01 RX ADMIN — DIAZEPAM 5 MG: 5 INJECTION, SOLUTION INTRAMUSCULAR; INTRAVENOUS at 03:06

## 2024-01-01 RX ADMIN — Medication 1 MG: at 02:07

## 2024-01-01 RX ADMIN — DIAZEPAM 2.5 MG: 5 INJECTION INTRAMUSCULAR; INTRAVENOUS at 14:20

## 2024-01-01 RX ADMIN — DIAZEPAM 2.5 MG: 5 INJECTION INTRAMUSCULAR; INTRAVENOUS at 10:27

## 2024-01-01 RX ADMIN — MORPHINE SULFATE 2 MG: 2 INJECTION, SOLUTION INTRAMUSCULAR; INTRAVENOUS at 13:03

## 2024-01-01 RX ADMIN — MORPHINE SULFATE 2 MG: 2 INJECTION, SOLUTION INTRAMUSCULAR; INTRAVENOUS at 11:03

## 2024-01-01 RX ADMIN — MORPHINE SULFATE 2 MG: 2 INJECTION, SOLUTION INTRAMUSCULAR; INTRAVENOUS at 17:33

## 2024-01-01 RX ADMIN — MORPHINE SULFATE 2 MG: 2 INJECTION, SOLUTION INTRAMUSCULAR; INTRAVENOUS at 00:14

## 2024-01-01 RX ADMIN — DIAZEPAM 5 MG: 5 INJECTION, SOLUTION INTRAMUSCULAR; INTRAVENOUS at 05:13

## 2024-01-01 RX ADMIN — MORPHINE SULFATE 2 MG: 2 INJECTION, SOLUTION INTRAMUSCULAR; INTRAVENOUS at 09:49

## 2024-01-01 RX ADMIN — DIAZEPAM 2.5 MG: 5 INJECTION INTRAMUSCULAR; INTRAVENOUS at 09:13

## 2024-01-01 RX ADMIN — MORPHINE SULFATE 4 MG: 4 INJECTION, SOLUTION INTRAMUSCULAR; INTRAVENOUS at 08:24

## 2024-01-01 RX ADMIN — MORPHINE SULFATE 2 MG: 2 INJECTION, SOLUTION INTRAMUSCULAR; INTRAVENOUS at 16:16

## 2024-01-01 RX ADMIN — DIAZEPAM 2.5 MG: 5 INJECTION INTRAMUSCULAR; INTRAVENOUS at 12:01

## 2024-01-01 NOTE — PROGRESS NOTES
01/01/24 1103   Encounter Summary   Encounter Overview/Reason  Spiritual/Emotional Needs   Service Provided For: Patient and family together   Referral/Consult From: Other    Support System Spouse;Children   Last Encounter  01/01/24  ( provided EOL discussion and prayer with family)   Complexity of Encounter Moderate   Begin Time 1049   End Time  1103   Total Time Calculated 14 min   Spiritual/Emotional needs   Type Spiritual Support   Grief, Loss, and Adjustments   Type Anticipatory Grief   Assessment/Intervention/Outcome   Assessment Coping   Intervention Prayer (assurance of)/[place]   Outcome Expressed Gratitude

## 2024-01-01 NOTE — PLAN OF CARE
Noted plans of [place] care  Nephrology team will sign off. Please call with questions or if plans change.

## 2024-01-01 NOTE — PROGRESS NOTES
PS message to Adam Alvarado NP:  pt's respirations continue to be elevated, 30s-40s/min with accessory muscle use.  Can we get morphine increased and/or timeframe decreased for comfort care?  Awaiting response.

## 2024-01-01 NOTE — PROGRESS NOTES
Pt , confirmed with Sonia SALDAÑA.  HR 0 RR 0.  [facility] center notified.  Dr. Lawson notified via perfect serve

## 2024-01-01 NOTE — PROGRESS NOTES
Pt's eyes closed, non-responsive.  Pt diaphoretic, linens changed.  Respirations labored with retractions 28-42/min, O2 15 L HFNC.  Discussed oxygen being decreased for comfort measures and family declined.  Wife and daughters at bedside.  Family denies needs.  Hospitality cart refreshed by this writer.

## 2024-01-01 NOTE — PROGRESS NOTES
Pt non-responsive. PRN medication being administered per MAR for comfort care. O2 remains on 15L HFNC. Family denies any needs at this time. Will continue to monitor.

## 2024-01-01 NOTE — PROGRESS NOTES
PS message to Adam Alvarado NP regarding changing pt's comfort meds to IV.  Morphine changed.  Ativan remains SL.

## 2024-01-02 NOTE — DISCHARGE SUMMARY
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 male who presents with confusion and hypercalcemia. PMHx significant for new diagnosis of cancer with bladder mass and lung nodules concerning for metastatic disease, presented with some hematuria and was found to have hypercalcemia. Labs showed potassium of 3.4, calcium of 15.5 with recent calcium from 2023 at 10.0.  Glucose was 152, white blood cells were elevated at 12.6 and his urinalysis showed large amount of blood with small leukocytosis, chest x-ray showed multiple pulmonary nodules worrisome for metastatic disease, CT scan head showed no acute finding. He had recent CT scan abdomen on 2023 which showed multiple lung nodules bilateral up to 1.3 cm with recommendation for further testing including PET scan and liver biopsy, also irregular wall thickening of the anterior bladder due to concern of a bladder mass.         Assessment/Plan:    1.  Acute metabolic encephalopathy possibly related to dehydration and hypercalcemia.   2.  Hypercalcemia.  Secondary to metastatic disease.    3.  UTI.  Treated with antibiotics  4.  Electrolyte abnormality.  5.
